# Patient Record
Sex: FEMALE | Race: WHITE | NOT HISPANIC OR LATINO | Employment: OTHER
[De-identification: names, ages, dates, MRNs, and addresses within clinical notes are randomized per-mention and may not be internally consistent; named-entity substitution may affect disease eponyms.]

---

## 2023-02-15 ENCOUNTER — TRANSCRIBE ORDERS (OUTPATIENT)
Dept: OTHER | Age: 68
End: 2023-02-15

## 2023-02-15 DIAGNOSIS — M25.561 RIGHT KNEE PAIN: ICD-10-CM

## 2023-02-15 DIAGNOSIS — M19.90 OSTEOARTHRITIS: Primary | ICD-10-CM

## 2023-03-01 ENCOUNTER — THERAPY VISIT (OUTPATIENT)
Dept: PHYSICAL THERAPY | Facility: CLINIC | Age: 68
End: 2023-03-01
Attending: INTERNAL MEDICINE
Payer: COMMERCIAL

## 2023-03-01 DIAGNOSIS — M25.561 RIGHT KNEE PAIN: ICD-10-CM

## 2023-03-01 DIAGNOSIS — M17.0 OSTEOARTHRITIS OF BOTH KNEES, UNSPECIFIED OSTEOARTHRITIS TYPE: ICD-10-CM

## 2023-03-01 DIAGNOSIS — M19.90 OSTEOARTHRITIS: ICD-10-CM

## 2023-03-01 PROCEDURE — 97110 THERAPEUTIC EXERCISES: CPT | Mod: GP | Performed by: PHYSICAL THERAPIST

## 2023-03-01 PROCEDURE — 97161 PT EVAL LOW COMPLEX 20 MIN: CPT | Mod: GP | Performed by: PHYSICAL THERAPIST

## 2023-03-01 ASSESSMENT — ACTIVITIES OF DAILY LIVING (ADL)
HOW_WOULD_YOU_RATE_THE_OVERALL_FUNCTION_OF_YOUR_KNEE_DURING_YOUR_USUAL_DAILY_ACTIVITIES?: ABNORMAL
STAND: ACTIVITY IS FAIRLY DIFFICULT
SQUAT: ACTIVITY IS MINIMALLY DIFFICULT
HOW_WOULD_YOU_RATE_THE_CURRENT_FUNCTION_OF_YOUR_KNEE_DURING_YOUR_USUAL_DAILY_ACTIVITIES_ON_A_SCALE_FROM_0_TO_100_WITH_100_BEING_YOUR_LEVEL_OF_KNEE_FUNCTION_PRIOR_TO_YOUR_INJURY_AND_0_BEING_THE_INABILITY_TO_PERFORM_ANY_OF_YOUR_USUAL_DAILY_ACTIVITIES?: 50
KNEEL ON THE FRONT OF YOUR KNEE: ACTIVITY IS SOMEWHAT DIFFICULT
GO UP STAIRS: ACTIVITY IS SOMEWHAT DIFFICULT
PAIN: THE SYMPTOM AFFECTS MY ACTIVITY MODERATELY
AS_A_RESULT_OF_YOUR_KNEE_INJURY,_HOW_WOULD_YOU_RATE_YOUR_CURRENT_LEVEL_OF_DAILY_ACTIVITY?: ABNORMAL
SWELLING: THE SYMPTOM AFFECTS MY ACTIVITY MODERATELY
RAW_SCORE: 35
WEAKNESS: THE SYMPTOM AFFECTS MY ACTIVITY SEVERELY
LIMPING: THE SYMPTOM AFFECTS MY ACTIVITY SEVERELY
SIT WITH YOUR KNEE BENT: ACTIVITY IS NOT DIFFICULT
KNEE_ACTIVITY_OF_DAILY_LIVING_SCORE: 50
GIVING WAY, BUCKLING OR SHIFTING OF KNEE: THE SYMPTOM AFFECTS MY ACTIVITY MODERATELY
KNEE_ACTIVITY_OF_DAILY_LIVING_SUM: 35
WALK: ACTIVITY IS FAIRLY DIFFICULT
STIFFNESS: THE SYMPTOM AFFECTS MY ACTIVITY MODERATELY
RISE FROM A CHAIR: ACTIVITY IS MINIMALLY DIFFICULT
GO DOWN STAIRS: ACTIVITY IS FAIRLY DIFFICULT

## 2023-03-01 NOTE — PROGRESS NOTES
[facility]    OUTPATIENT Physical Therapy ORTHOPEDIC EVALUATION  PLAN OF TREATMENT FOR OUTPATIENT REHABILITATION  (COMPLETE FOR INITIAL CLAIMS ONLY)  Patient's Last Name, First Name, M.I.  YOB: 1955  Nila Viveros    Provider s Name:  [facility]   Medical Record No.  [id number]   Start of Care Date:  03/01/23   Onset Date:   09/01/22   Treatment Diagnosis:  R> L OA of knee Medical Diagnosis:     Osteoarthritis  Right knee pain  Osteoarthritis of both knees, unspecified osteoarthritis type       Goals:     03/01/23 0500   Body Part   Goals listed below are for B knee OA R>L   Goal #1   Goal #1 ambulation   Previous Functional Level No restrictions   Current Functional Level Minutes patient can walk   Performance Level 10 min 2/10 pain   STG Target Performance Minutes patient will be able to walk   Performance Level 10 min 1/10 pain   Rationale for safe community ambulation   Due Date 03/26/23    LTG Target Performance Minutes patient will be able to  walk   Performance Level 20 min 1/10 pain   Rationale to promote a healthy and active lifestyle;for safe community ambulation   Due Date 04/15/23       Therapy Frequency:  1 x a week  Predicted Duration of Therapy Intervention:  4 weeks tapering to 1 x a month x 2 months    Michael Kaplan, ANABELLE                 I CERTIFY THE NEED FOR THESE SERVICES FURNISHED UNDER                    THIS PLAN OF TREATMENT AND WHILE UNDER MY CARE .             Physician Signature                                       Date    X_____________________________________________________                      Certification Date From:  03/01/23   Certification Date To:  05/29/23    Referring Provider:  Alyson Sheriff    Initial Assessment        See Epic Evaluation SOC Date: 03/01/23

## 2023-03-01 NOTE — PROGRESS NOTES
Physical Therapy Initial Evaluation  Subjective:  The history is provided by the patient.   Therapist Generated HPI Evaluation  Problem details: Pt referred to PT for R > L knee pain with MD orders dated 2/14/2023 for OA and knee pain.  Pt complains of pain starting 9/2022 for unknown reasons.  Recalls playing pickleball and didn't feel it during activity but reports feeling it that evening.  Reports limiting activities since Sept due to pain.  Pt reports had MRI in Nov and was told she had a meniscus tear.  Pt is getting stem cell injection in the knee.  Has had 2 injections (Nov and Feb 6th and last one will be in March).  Pt goal is to get stronger.  She is hoping to join health club and likes to swim.  .         Type of problem:  Right knee.    This is a chronic condition.  Condition occurred with:  Insidious onset.  Where condition occurred: during recreation/sport and at home.  Patient reports pain:  In the joint.  Pain quality: dull pain. and is intermittent.  Pain radiates to:  Knee. Pain is worse in the P.M..  Since onset symptoms are gradually improving.  Associated symptoms:  Loss of strength (feels unstable, feels weak). Symptoms are exacerbated by ascending stairs, descending stairs, standing, walking and bending/squatting  and relieved by rest and ice (elevating the legs, advil ).  Special tests included:  MRI and x-ray (told she had meniscus tear, OA of knee).                            Objective:  Standing Alignment:              Knee:  Genu varus R and genu varus L (R knee flexed, lacks full extension moderately)      Gait:    Gait Type:  Antalgic     Deviations:  Hip:  Decr dynamic control LKnee:  Knee extension decr R    Flexibility/Screens:   Positive screens:  Knee                                                          Knee Evaluation:  ROM:    AROM      Extension:  Left: -5    Right:  -8  Flexion: Left: 135    Right: 132        Strength:     Extension:  Left: 5/5   Pain:      Right: 5/5    Pain:  Flexion:  Left: 5/5   Pain:      Right: 5/5   Pain:    Quad Set Left: Good    Pain:   Quad Set Right: Fair    Pain:            Functional Testing:          Quad:    Single Leg Squat:  Left:      Right:        Bilateral Leg Squat:   Excessive anterior knee excursion              General     ROS    Assessment/Plan:    Patient is a 67 year old female with both sides knee complaints.    Patient has the following significant findings with corresponding treatment plan.                Diagnosis 1:  B knee OA R > L  Pain -  manual therapy, self management, education and home program  Decreased ROM/flexibility - manual therapy, therapeutic exercise, therapeutic activity and home program  Decreased joint mobility - manual therapy, therapeutic exercise, therapeutic activity and home program  Decreased strength - therapeutic exercise, therapeutic activities and home program  Impaired gait - gait training and home program  Impaired muscle performance - neuro re-education and home program  Decreased function - therapeutic activities and home program    Therapy Evaluation Codes:   1) History comprised of:   Personal factors that impact the plan of care:      Age.    Comorbidity factors that impact the plan of care are:      Osteoarthritis.     Medications impacting care: None.  2) Examination of Body Systems comprised of:   Body structures and functions that impact the plan of care:      Knee.   Activity limitations that impact the plan of care are:      Stairs, Standing and Walking.  3) Clinical presentation characteristics are:   Stable/Uncomplicated.  4) Decision-Making    Low complexity using standardized patient assessment instrument and/or measureable assessment of functional outcome.  Cumulative Therapy Evaluation is: Low complexity.    Previous and current functional limitations:  (See Goal Flow Sheet for this information)    Short term and Long term goals: (See Goal Flow Sheet for this information)      Communication ability:  Patient appears to be able to clearly communicate and understand verbal and written communication and follow directions correctly.  Treatment Explanation - The following has been discussed with the patient:   RX ordered/plan of care  Anticipated outcomes  Possible risks and side effects  This patient would benefit from PT intervention to resume normal activities.   Rehab potential is good to fair    Frequency:  1 X week, once daily  Duration:  for 6 weeks  Discharge Plan:  Achieve all LTG.  Independent in home treatment program.  Reach maximal therapeutic benefit.    Please refer to the daily flowsheet for treatment today, total treatment time and time spent performing 1:1 timed codes.

## 2023-03-08 ENCOUNTER — THERAPY VISIT (OUTPATIENT)
Dept: PHYSICAL THERAPY | Facility: CLINIC | Age: 68
End: 2023-03-08
Attending: INTERNAL MEDICINE
Payer: COMMERCIAL

## 2023-03-08 DIAGNOSIS — M17.0 OSTEOARTHRITIS OF BOTH KNEES, UNSPECIFIED OSTEOARTHRITIS TYPE: Primary | ICD-10-CM

## 2023-03-08 PROCEDURE — 97110 THERAPEUTIC EXERCISES: CPT | Mod: GP | Performed by: PHYSICAL THERAPIST

## 2023-03-08 PROCEDURE — 97140 MANUAL THERAPY 1/> REGIONS: CPT | Mod: GP | Performed by: PHYSICAL THERAPIST

## 2023-03-15 ENCOUNTER — THERAPY VISIT (OUTPATIENT)
Dept: PHYSICAL THERAPY | Facility: CLINIC | Age: 68
End: 2023-03-15
Attending: INTERNAL MEDICINE
Payer: COMMERCIAL

## 2023-03-15 DIAGNOSIS — M17.0 OSTEOARTHRITIS OF BOTH KNEES, UNSPECIFIED OSTEOARTHRITIS TYPE: Primary | ICD-10-CM

## 2023-03-15 PROCEDURE — 97110 THERAPEUTIC EXERCISES: CPT | Mod: GP | Performed by: PHYSICAL THERAPIST

## 2023-03-15 PROCEDURE — 97140 MANUAL THERAPY 1/> REGIONS: CPT | Mod: GP | Performed by: PHYSICAL THERAPIST

## 2023-03-22 ENCOUNTER — THERAPY VISIT (OUTPATIENT)
Dept: PHYSICAL THERAPY | Facility: CLINIC | Age: 68
End: 2023-03-22
Payer: COMMERCIAL

## 2023-03-22 DIAGNOSIS — M17.0 OSTEOARTHRITIS OF BOTH KNEES, UNSPECIFIED OSTEOARTHRITIS TYPE: Primary | ICD-10-CM

## 2023-03-22 PROCEDURE — 97110 THERAPEUTIC EXERCISES: CPT | Mod: GP | Performed by: PHYSICAL THERAPIST

## 2023-03-22 PROCEDURE — 97140 MANUAL THERAPY 1/> REGIONS: CPT | Mod: GP | Performed by: PHYSICAL THERAPIST

## 2023-04-05 ENCOUNTER — THERAPY VISIT (OUTPATIENT)
Dept: PHYSICAL THERAPY | Facility: CLINIC | Age: 68
End: 2023-04-05
Payer: COMMERCIAL

## 2023-04-05 DIAGNOSIS — M17.0 OSTEOARTHRITIS OF BOTH KNEES, UNSPECIFIED OSTEOARTHRITIS TYPE: Primary | ICD-10-CM

## 2023-04-05 PROCEDURE — 97110 THERAPEUTIC EXERCISES: CPT | Mod: GP | Performed by: PHYSICAL THERAPIST

## 2023-04-05 PROCEDURE — 97140 MANUAL THERAPY 1/> REGIONS: CPT | Mod: GP | Performed by: PHYSICAL THERAPIST

## 2023-04-05 PROCEDURE — 97116 GAIT TRAINING THERAPY: CPT | Mod: GP | Performed by: PHYSICAL THERAPIST

## 2023-04-19 ENCOUNTER — THERAPY VISIT (OUTPATIENT)
Dept: PHYSICAL THERAPY | Facility: CLINIC | Age: 68
End: 2023-04-19
Payer: COMMERCIAL

## 2023-04-19 DIAGNOSIS — M17.0 OSTEOARTHRITIS OF BOTH KNEES, UNSPECIFIED OSTEOARTHRITIS TYPE: Primary | ICD-10-CM

## 2023-04-19 PROCEDURE — 97110 THERAPEUTIC EXERCISES: CPT | Mod: GP | Performed by: PHYSICAL THERAPIST

## 2023-04-19 ASSESSMENT — ACTIVITIES OF DAILY LIVING (ADL)
HOW_WOULD_YOU_RATE_THE_OVERALL_FUNCTION_OF_YOUR_KNEE_DURING_YOUR_USUAL_DAILY_ACTIVITIES?: NEARLY NORMAL
WEAKNESS: THE SYMPTOM AFFECTS MY ACTIVITY MODERATELY
STAND: ACTIVITY IS MINIMALLY DIFFICULT
WALK: ACTIVITY IS MINIMALLY DIFFICULT
PAIN: THE SYMPTOM AFFECTS MY ACTIVITY MODERATELY
HOW_WOULD_YOU_RATE_THE_CURRENT_FUNCTION_OF_YOUR_KNEE_DURING_YOUR_USUAL_DAILY_ACTIVITIES_ON_A_SCALE_FROM_0_TO_100_WITH_100_BEING_YOUR_LEVEL_OF_KNEE_FUNCTION_PRIOR_TO_YOUR_INJURY_AND_0_BEING_THE_INABILITY_TO_PERFORM_ANY_OF_YOUR_USUAL_DAILY_ACTIVITIES?: 80
LIMPING: THE SYMPTOM AFFECTS MY ACTIVITY SLIGHTLY
STIFFNESS: I HAVE THE SYMPTOM BUT IT DOES NOT AFFECT MY ACTIVITY
RISE FROM A CHAIR: ACTIVITY IS NOT DIFFICULT
SWELLING: I HAVE THE SYMPTOM BUT IT DOES NOT AFFECT MY ACTIVITY
GO DOWN STAIRS: ACTIVITY IS NOT DIFFICULT
AS_A_RESULT_OF_YOUR_KNEE_INJURY,_HOW_WOULD_YOU_RATE_YOUR_CURRENT_LEVEL_OF_DAILY_ACTIVITY?: NEARLY NORMAL
RAW_SCORE: 53
GIVING WAY, BUCKLING OR SHIFTING OF KNEE: THE SYMPTOM AFFECTS MY ACTIVITY SLIGHTLY
KNEE_ACTIVITY_OF_DAILY_LIVING_SUM: 53
GO UP STAIRS: ACTIVITY IS MINIMALLY DIFFICULT
KNEE_ACTIVITY_OF_DAILY_LIVING_SCORE: 75.71
KNEEL ON THE FRONT OF YOUR KNEE: ACTIVITY IS MINIMALLY DIFFICULT
SIT WITH YOUR KNEE BENT: ACTIVITY IS NOT DIFFICULT
SQUAT: ACTIVITY IS MINIMALLY DIFFICULT

## 2023-06-14 NOTE — PROGRESS NOTES
[facility]    Outpatient Physical Therapy Discharge Note  Patient: Nila Viveros  : 1955    Patient was seen for 6 visits for Knee pain from 3/1/23 to 23 with no follow up appointments.      Plan:  Discharge from therapy.     Reason for Discharge: Patient has met all goals.  No further expectation of progress.    Discharge Plan: Patient to continue home program.     If patient would like to return to therapy, they will need a new PT order from referring provider.    Michelle Box, PT   2023

## 2023-09-29 RX ORDER — LISINOPRIL 20 MG/1
1 TABLET ORAL DAILY
COMMUNITY
Start: 2023-06-07

## 2023-09-29 RX ORDER — EPINEPHRINE 0.3 MG/.3ML
0.3 INJECTION SUBCUTANEOUS PRN
COMMUNITY

## 2023-09-29 RX ORDER — ZINC OXIDE 13 %
1 CREAM (GRAM) TOPICAL DAILY
COMMUNITY
Start: 2022-09-22

## 2023-10-02 RX ORDER — SENNOSIDES 8.6 MG
650-1300 CAPSULE ORAL EVERY 8 HOURS PRN
Status: ON HOLD | COMMUNITY
End: 2023-10-03

## 2023-10-02 RX ORDER — LANOLIN ALCOHOL/MO/W.PET/CERES
3 CREAM (GRAM) TOPICAL
COMMUNITY

## 2023-10-02 NOTE — PROGRESS NOTES
PTA medications updated by Medication Scribe prior to surgery via phone call with patient (last doses completed by Nurse)     Medication history sources: Patient, Surescripts, and H&P  In the past week, patient estimated taking medication this percent of the time: Greater than 90%      Significant changes made to the medication list:  None      Additional medication history information:   Patient was advised to bring: Flonase nasal spray     Medication reconciliation completed by provider prior to medication history? No    Time spent in this activity: 28 minutes    The information provided in this note is only as accurate as the sources available at the time of update(s)      Prior to Admission medications    Medication Sig Last Dose Taking? Auth Provider Long Term End Date   acetaminophen (TYLENOL 8 HOUR ARTHRITIS PAIN) 650 MG CR tablet Take 650-1,300 mg by mouth every 8 hours as needed for mild pain or fever Unknown at PRN Yes Reported, Patient     cholecalciferol (VITAMIN D3) 125 mcg (5000 units) capsule Take 125 mcg by mouth daily 9/30/2023 at AM Yes Reported, Patient     EPINEPHrine (ANY BX GENERIC EQUIV) 0.3 MG/0.3ML injection 2-pack Inject 0.3 mg into the muscle as needed for anaphylaxis May repeat one time in 5-15 minutes if response to initial dose is inadequate. Never used at PRN Yes Reported, Patient     FLUTICASONE PROPIONATE, NASAL, NA Spray 1 spray in nostril daily as needed Unknown at PRN Yes Reported, Patient     lisinopril (ZESTRIL) 20 MG tablet Take 1 tablet by mouth daily 10/2/2023 at AM Yes Reported, Patient Yes    melatonin 3 MG tablet Take 3 mg by mouth nightly as needed for sleep 9/29/2023 at HS Yes Reported, Patient     Misc Natural Products (OSTEO BI-FLEX ADV JOINT SHIELD) TABS Take 1,500 mg by mouth daily 9/30/2023 at AM Yes Reported, Patient     Probiotic Product (PROBIOTIC DAILY) CAPS Take 1 capsule by mouth daily 9/30/2023 at AM Yes Reported, Patient     Ubiquinol 100 MG CAPS Take 100 mg  by mouth daily 9/30/2023 at AM Yes Reported, Patient

## 2023-10-03 ENCOUNTER — APPOINTMENT (OUTPATIENT)
Dept: GENERAL RADIOLOGY | Facility: CLINIC | Age: 68
End: 2023-10-03
Attending: ORTHOPAEDIC SURGERY
Payer: COMMERCIAL

## 2023-10-03 ENCOUNTER — MEDICAL CORRESPONDENCE (OUTPATIENT)
Dept: HEALTH INFORMATION MANAGEMENT | Facility: CLINIC | Age: 68
End: 2023-10-03

## 2023-10-03 ENCOUNTER — ANESTHESIA EVENT (OUTPATIENT)
Dept: SURGERY | Facility: CLINIC | Age: 68
End: 2023-10-03
Payer: COMMERCIAL

## 2023-10-03 ENCOUNTER — ANESTHESIA (OUTPATIENT)
Dept: SURGERY | Facility: CLINIC | Age: 68
End: 2023-10-03
Payer: COMMERCIAL

## 2023-10-03 ENCOUNTER — HOSPITAL ENCOUNTER (OUTPATIENT)
Facility: CLINIC | Age: 68
Discharge: NURSING, REHAB OR LONG-TERM CARE | End: 2023-10-05
Attending: ORTHOPAEDIC SURGERY | Admitting: ORTHOPAEDIC SURGERY
Payer: COMMERCIAL

## 2023-10-03 ENCOUNTER — APPOINTMENT (OUTPATIENT)
Dept: PHYSICAL THERAPY | Facility: CLINIC | Age: 68
End: 2023-10-03
Attending: ORTHOPAEDIC SURGERY
Payer: COMMERCIAL

## 2023-10-03 DIAGNOSIS — Z96.651 S/P TOTAL KNEE ARTHROPLASTY, RIGHT: Primary | ICD-10-CM

## 2023-10-03 LAB
FASTING STATUS PATIENT QL REPORTED: YES
GLUCOSE SERPL-MCNC: 101 MG/DL (ref 70–99)
POTASSIUM SERPL-SCNC: 4.2 MMOL/L (ref 3.4–5.3)

## 2023-10-03 PROCEDURE — 250N000011 HC RX IP 250 OP 636: Mod: JZ | Performed by: PHYSICIAN ASSISTANT

## 2023-10-03 PROCEDURE — 258N000003 HC RX IP 258 OP 636: Performed by: ANESTHESIOLOGY

## 2023-10-03 PROCEDURE — 250N000011 HC RX IP 250 OP 636: Performed by: ANESTHESIOLOGY

## 2023-10-03 PROCEDURE — 250N000011 HC RX IP 250 OP 636: Performed by: PHYSICIAN ASSISTANT

## 2023-10-03 PROCEDURE — 82947 ASSAY GLUCOSE BLOOD QUANT: CPT | Performed by: ANESTHESIOLOGY

## 2023-10-03 PROCEDURE — 258N000003 HC RX IP 258 OP 636: Performed by: PHYSICIAN ASSISTANT

## 2023-10-03 PROCEDURE — 370N000017 HC ANESTHESIA TECHNICAL FEE, PER MIN: Performed by: ORTHOPAEDIC SURGERY

## 2023-10-03 PROCEDURE — 97110 THERAPEUTIC EXERCISES: CPT | Mod: GP

## 2023-10-03 PROCEDURE — 36415 COLL VENOUS BLD VENIPUNCTURE: CPT | Performed by: ANESTHESIOLOGY

## 2023-10-03 PROCEDURE — 999N000141 HC STATISTIC PRE-PROCEDURE NURSING ASSESSMENT: Performed by: ORTHOPAEDIC SURGERY

## 2023-10-03 PROCEDURE — 710N000009 HC RECOVERY PHASE 1, LEVEL 1, PER MIN: Performed by: ORTHOPAEDIC SURGERY

## 2023-10-03 PROCEDURE — 360N000077 HC SURGERY LEVEL 4, PER MIN: Performed by: ORTHOPAEDIC SURGERY

## 2023-10-03 PROCEDURE — 250N000011 HC RX IP 250 OP 636: Mod: JZ | Performed by: NURSE ANESTHETIST, CERTIFIED REGISTERED

## 2023-10-03 PROCEDURE — C1776 JOINT DEVICE (IMPLANTABLE): HCPCS | Performed by: ORTHOPAEDIC SURGERY

## 2023-10-03 PROCEDURE — C1713 ANCHOR/SCREW BN/BN,TIS/BN: HCPCS | Performed by: ORTHOPAEDIC SURGERY

## 2023-10-03 PROCEDURE — 250N000013 HC RX MED GY IP 250 OP 250 PS 637: Performed by: PHYSICIAN ASSISTANT

## 2023-10-03 PROCEDURE — 97161 PT EVAL LOW COMPLEX 20 MIN: CPT | Mod: GP

## 2023-10-03 PROCEDURE — 250N000009 HC RX 250: Performed by: ANESTHESIOLOGY

## 2023-10-03 PROCEDURE — 250N000011 HC RX IP 250 OP 636: Performed by: ORTHOPAEDIC SURGERY

## 2023-10-03 PROCEDURE — 250N000013 HC RX MED GY IP 250 OP 250 PS 637: Performed by: ORTHOPAEDIC SURGERY

## 2023-10-03 PROCEDURE — 999N000063 XR KNEE PORT RIGHT 1/2 VIEWS: Mod: RT

## 2023-10-03 PROCEDURE — 272N000001 HC OR GENERAL SUPPLY STERILE: Performed by: ORTHOPAEDIC SURGERY

## 2023-10-03 PROCEDURE — 84132 ASSAY OF SERUM POTASSIUM: CPT | Performed by: ANESTHESIOLOGY

## 2023-10-03 PROCEDURE — 97530 THERAPEUTIC ACTIVITIES: CPT | Mod: GP

## 2023-10-03 PROCEDURE — 258N000003 HC RX IP 258 OP 636: Performed by: NURSE ANESTHETIST, CERTIFIED REGISTERED

## 2023-10-03 DEVICE — GENESIS II NON-POROUS TIBIAL                                    BASEPLATE SIZE 4 RIGHT
Type: IMPLANTABLE DEVICE | Site: KNEE | Status: FUNCTIONAL
Brand: GENESIS II

## 2023-10-03 DEVICE — LEGION NARROW POSTERIOR STABILIZED                                    OXINIUM SIZE 5N RIGHT
Type: IMPLANTABLE DEVICE | Site: KNEE | Status: FUNCTIONAL
Brand: LEGION

## 2023-10-03 DEVICE — GENESIS II RESURFACING PATELLAR                                    PROSTHESIS  32MM
Type: IMPLANTABLE DEVICE | Site: KNEE | Status: FUNCTIONAL
Brand: GENESIS II

## 2023-10-03 DEVICE — BONE CEMENT RADIOPAQUE SIMPLEX HV FULL DOSE 6194-1-001: Type: IMPLANTABLE DEVICE | Site: KNEE | Status: FUNCTIONAL

## 2023-10-03 DEVICE — LEGION POSTERIOR STABILIZED HIGH                                    FLEX HIGHLY CROSS LINKED                                    POLYETHYLENE SIZE 3-4 11MM
Type: IMPLANTABLE DEVICE | Site: KNEE | Status: FUNCTIONAL
Brand: LEGION

## 2023-10-03 RX ORDER — LIDOCAINE HYDROCHLORIDE 20 MG/ML
INJECTION, SOLUTION INFILTRATION; PERINEURAL PRN
Status: DISCONTINUED | OUTPATIENT
Start: 2023-10-03 | End: 2023-10-03

## 2023-10-03 RX ORDER — AMOXICILLIN 250 MG
1-2 CAPSULE ORAL 2 TIMES DAILY
Qty: 30 TABLET | Refills: 0 | Status: SHIPPED | OUTPATIENT
Start: 2023-10-03 | End: 2023-10-11

## 2023-10-03 RX ORDER — ONDANSETRON 2 MG/ML
4 INJECTION INTRAMUSCULAR; INTRAVENOUS EVERY 30 MIN PRN
Status: DISCONTINUED | OUTPATIENT
Start: 2023-10-03 | End: 2023-10-03 | Stop reason: HOSPADM

## 2023-10-03 RX ORDER — AMOXICILLIN 250 MG
1 CAPSULE ORAL 2 TIMES DAILY
Status: DISCONTINUED | OUTPATIENT
Start: 2023-10-03 | End: 2023-10-05 | Stop reason: HOSPADM

## 2023-10-03 RX ORDER — CEFAZOLIN SODIUM 1 G/3ML
1 INJECTION, POWDER, FOR SOLUTION INTRAMUSCULAR; INTRAVENOUS EVERY 8 HOURS
Status: COMPLETED | OUTPATIENT
Start: 2023-10-03 | End: 2023-10-04

## 2023-10-03 RX ORDER — NALOXONE HYDROCHLORIDE 0.4 MG/ML
0.4 INJECTION, SOLUTION INTRAMUSCULAR; INTRAVENOUS; SUBCUTANEOUS
Status: DISCONTINUED | OUTPATIENT
Start: 2023-10-03 | End: 2023-10-05 | Stop reason: HOSPADM

## 2023-10-03 RX ORDER — HYDROMORPHONE HYDROCHLORIDE 2 MG/1
2-4 TABLET ORAL EVERY 4 HOURS PRN
Qty: 31 TABLET | Refills: 0 | Status: SHIPPED | OUTPATIENT
Start: 2023-10-03 | End: 2023-10-11

## 2023-10-03 RX ORDER — HYDROMORPHONE HYDROCHLORIDE 2 MG/1
2 TABLET ORAL EVERY 4 HOURS PRN
Status: DISCONTINUED | OUTPATIENT
Start: 2023-10-03 | End: 2023-10-05 | Stop reason: HOSPADM

## 2023-10-03 RX ORDER — PROCHLORPERAZINE MALEATE 5 MG
5 TABLET ORAL EVERY 6 HOURS PRN
Status: DISCONTINUED | OUTPATIENT
Start: 2023-10-03 | End: 2023-10-05 | Stop reason: HOSPADM

## 2023-10-03 RX ORDER — FENTANYL CITRATE 50 UG/ML
INJECTION, SOLUTION INTRAMUSCULAR; INTRAVENOUS PRN
Status: DISCONTINUED | OUTPATIENT
Start: 2023-10-03 | End: 2023-10-03

## 2023-10-03 RX ORDER — SODIUM CHLORIDE, SODIUM LACTATE, POTASSIUM CHLORIDE, CALCIUM CHLORIDE 600; 310; 30; 20 MG/100ML; MG/100ML; MG/100ML; MG/100ML
INJECTION, SOLUTION INTRAVENOUS CONTINUOUS
Status: DISCONTINUED | OUTPATIENT
Start: 2023-10-03 | End: 2023-10-03 | Stop reason: HOSPADM

## 2023-10-03 RX ORDER — ASPIRIN 81 MG/1
81 TABLET ORAL 2 TIMES DAILY
Qty: 60 TABLET | Refills: 0 | Status: SHIPPED | OUTPATIENT
Start: 2023-10-03 | End: 2023-12-22

## 2023-10-03 RX ORDER — PROPOFOL 10 MG/ML
INJECTION, EMULSION INTRAVENOUS PRN
Status: DISCONTINUED | OUTPATIENT
Start: 2023-10-03 | End: 2023-10-03

## 2023-10-03 RX ORDER — ONDANSETRON 4 MG/1
4 TABLET, ORALLY DISINTEGRATING ORAL EVERY 6 HOURS PRN
Status: DISCONTINUED | OUTPATIENT
Start: 2023-10-03 | End: 2023-10-05 | Stop reason: HOSPADM

## 2023-10-03 RX ORDER — ACETAMINOPHEN 325 MG/1
650 TABLET ORAL EVERY 4 HOURS PRN
Status: DISCONTINUED | OUTPATIENT
Start: 2023-10-06 | End: 2023-10-05 | Stop reason: HOSPADM

## 2023-10-03 RX ORDER — TRANEXAMIC ACID 650 MG/1
1950 TABLET ORAL ONCE
Status: COMPLETED | OUTPATIENT
Start: 2023-10-03 | End: 2023-10-03

## 2023-10-03 RX ORDER — HYDROMORPHONE HYDROCHLORIDE 4 MG/1
4 TABLET ORAL EVERY 4 HOURS PRN
Status: DISCONTINUED | OUTPATIENT
Start: 2023-10-03 | End: 2023-10-05 | Stop reason: HOSPADM

## 2023-10-03 RX ORDER — POLYETHYLENE GLYCOL 3350 17 G/17G
17 POWDER, FOR SOLUTION ORAL DAILY
Status: DISCONTINUED | OUTPATIENT
Start: 2023-10-04 | End: 2023-10-05 | Stop reason: HOSPADM

## 2023-10-03 RX ORDER — HYDROXYZINE HYDROCHLORIDE 10 MG/1
10 TABLET, FILM COATED ORAL EVERY 6 HOURS PRN
Status: DISCONTINUED | OUTPATIENT
Start: 2023-10-03 | End: 2023-10-05 | Stop reason: HOSPADM

## 2023-10-03 RX ORDER — ASPIRIN 81 MG/1
81 TABLET ORAL 2 TIMES DAILY
Status: DISCONTINUED | OUTPATIENT
Start: 2023-10-03 | End: 2023-10-05 | Stop reason: HOSPADM

## 2023-10-03 RX ORDER — VANCOMYCIN HYDROCHLORIDE 1 G/20ML
INJECTION, POWDER, LYOPHILIZED, FOR SOLUTION INTRAVENOUS PRN
Status: DISCONTINUED | OUTPATIENT
Start: 2023-10-03 | End: 2023-10-03 | Stop reason: HOSPADM

## 2023-10-03 RX ORDER — NALOXONE HYDROCHLORIDE 0.4 MG/ML
0.2 INJECTION, SOLUTION INTRAMUSCULAR; INTRAVENOUS; SUBCUTANEOUS
Status: DISCONTINUED | OUTPATIENT
Start: 2023-10-03 | End: 2023-10-05 | Stop reason: HOSPADM

## 2023-10-03 RX ORDER — HYDROMORPHONE HCL IN WATER/PF 6 MG/30 ML
0.2 PATIENT CONTROLLED ANALGESIA SYRINGE INTRAVENOUS EVERY 5 MIN PRN
Status: DISCONTINUED | OUTPATIENT
Start: 2023-10-03 | End: 2023-10-03 | Stop reason: HOSPADM

## 2023-10-03 RX ORDER — HYDROMORPHONE HCL IN WATER/PF 6 MG/30 ML
0.4 PATIENT CONTROLLED ANALGESIA SYRINGE INTRAVENOUS
Status: DISCONTINUED | OUTPATIENT
Start: 2023-10-03 | End: 2023-10-05 | Stop reason: HOSPADM

## 2023-10-03 RX ORDER — HYDROMORPHONE HCL IN WATER/PF 6 MG/30 ML
0.4 PATIENT CONTROLLED ANALGESIA SYRINGE INTRAVENOUS EVERY 5 MIN PRN
Status: DISCONTINUED | OUTPATIENT
Start: 2023-10-03 | End: 2023-10-03 | Stop reason: HOSPADM

## 2023-10-03 RX ORDER — CELECOXIB 200 MG/1
200 CAPSULE ORAL DAILY
Qty: 14 CAPSULE | Refills: 0 | Status: SHIPPED | OUTPATIENT
Start: 2023-10-03 | End: 2023-12-22

## 2023-10-03 RX ORDER — ONDANSETRON 2 MG/ML
4 INJECTION INTRAMUSCULAR; INTRAVENOUS EVERY 6 HOURS PRN
Status: DISCONTINUED | OUTPATIENT
Start: 2023-10-03 | End: 2023-10-05 | Stop reason: HOSPADM

## 2023-10-03 RX ORDER — HYDROMORPHONE HYDROCHLORIDE 2 MG/1
2-4 TABLET ORAL EVERY 4 HOURS PRN
Qty: 31 TABLET | Refills: 0 | Status: SHIPPED | OUTPATIENT
Start: 2023-10-03 | End: 2023-10-03

## 2023-10-03 RX ORDER — ONDANSETRON 4 MG/1
4 TABLET, ORALLY DISINTEGRATING ORAL EVERY 30 MIN PRN
Status: DISCONTINUED | OUTPATIENT
Start: 2023-10-03 | End: 2023-10-03 | Stop reason: HOSPADM

## 2023-10-03 RX ORDER — ACETAMINOPHEN 325 MG/1
975 TABLET ORAL EVERY 8 HOURS
Status: DISCONTINUED | OUTPATIENT
Start: 2023-10-03 | End: 2023-10-05 | Stop reason: HOSPADM

## 2023-10-03 RX ORDER — LIDOCAINE 40 MG/G
CREAM TOPICAL
Status: DISCONTINUED | OUTPATIENT
Start: 2023-10-03 | End: 2023-10-03 | Stop reason: HOSPADM

## 2023-10-03 RX ORDER — ONDANSETRON 2 MG/ML
INJECTION INTRAMUSCULAR; INTRAVENOUS PRN
Status: DISCONTINUED | OUTPATIENT
Start: 2023-10-03 | End: 2023-10-03

## 2023-10-03 RX ORDER — HYDROMORPHONE HCL IN WATER/PF 6 MG/30 ML
0.2 PATIENT CONTROLLED ANALGESIA SYRINGE INTRAVENOUS
Status: DISCONTINUED | OUTPATIENT
Start: 2023-10-03 | End: 2023-10-05 | Stop reason: HOSPADM

## 2023-10-03 RX ORDER — SODIUM CHLORIDE, SODIUM LACTATE, POTASSIUM CHLORIDE, CALCIUM CHLORIDE 600; 310; 30; 20 MG/100ML; MG/100ML; MG/100ML; MG/100ML
INJECTION, SOLUTION INTRAVENOUS CONTINUOUS
Status: DISCONTINUED | OUTPATIENT
Start: 2023-10-03 | End: 2023-10-05 | Stop reason: HOSPADM

## 2023-10-03 RX ORDER — ACETAMINOPHEN 325 MG/1
650 TABLET ORAL EVERY 4 HOURS PRN
Qty: 100 TABLET | Refills: 0 | Status: ON HOLD | OUTPATIENT
Start: 2023-10-03 | End: 2024-02-07

## 2023-10-03 RX ORDER — LIDOCAINE 40 MG/G
CREAM TOPICAL
Status: DISCONTINUED | OUTPATIENT
Start: 2023-10-03 | End: 2023-10-05 | Stop reason: HOSPADM

## 2023-10-03 RX ORDER — PROPOFOL 10 MG/ML
INJECTION, EMULSION INTRAVENOUS CONTINUOUS PRN
Status: DISCONTINUED | OUTPATIENT
Start: 2023-10-03 | End: 2023-10-03

## 2023-10-03 RX ORDER — DEXMEDETOMIDINE HYDROCHLORIDE 4 UG/ML
INJECTION, SOLUTION INTRAVENOUS PRN
Status: DISCONTINUED | OUTPATIENT
Start: 2023-10-03 | End: 2023-10-03

## 2023-10-03 RX ORDER — LISINOPRIL 20 MG/1
20 TABLET ORAL DAILY
Status: DISCONTINUED | OUTPATIENT
Start: 2023-10-03 | End: 2023-10-05 | Stop reason: HOSPADM

## 2023-10-03 RX ORDER — CEFAZOLIN SODIUM/WATER 2 G/20 ML
2 SYRINGE (ML) INTRAVENOUS
Status: COMPLETED | OUTPATIENT
Start: 2023-10-03 | End: 2023-10-03

## 2023-10-03 RX ORDER — BISACODYL 10 MG
10 SUPPOSITORY, RECTAL RECTAL DAILY PRN
Status: DISCONTINUED | OUTPATIENT
Start: 2023-10-03 | End: 2023-10-05 | Stop reason: HOSPADM

## 2023-10-03 RX ORDER — FENTANYL CITRATE 50 UG/ML
50 INJECTION, SOLUTION INTRAMUSCULAR; INTRAVENOUS EVERY 5 MIN PRN
Status: DISCONTINUED | OUTPATIENT
Start: 2023-10-03 | End: 2023-10-03 | Stop reason: HOSPADM

## 2023-10-03 RX ORDER — DEXAMETHASONE SODIUM PHOSPHATE 4 MG/ML
INJECTION, SOLUTION INTRA-ARTICULAR; INTRALESIONAL; INTRAMUSCULAR; INTRAVENOUS; SOFT TISSUE PRN
Status: DISCONTINUED | OUTPATIENT
Start: 2023-10-03 | End: 2023-10-03

## 2023-10-03 RX ORDER — FENTANYL CITRATE 50 UG/ML
25 INJECTION, SOLUTION INTRAMUSCULAR; INTRAVENOUS EVERY 5 MIN PRN
Status: DISCONTINUED | OUTPATIENT
Start: 2023-10-03 | End: 2023-10-03 | Stop reason: HOSPADM

## 2023-10-03 RX ORDER — CELECOXIB 100 MG/1
100 CAPSULE ORAL 2 TIMES DAILY
Status: COMPLETED | OUTPATIENT
Start: 2023-10-03 | End: 2023-10-05

## 2023-10-03 RX ORDER — FENTANYL CITRATE 0.05 MG/ML
25-100 INJECTION, SOLUTION INTRAMUSCULAR; INTRAVENOUS
Status: DISCONTINUED | OUTPATIENT
Start: 2023-10-03 | End: 2023-10-03 | Stop reason: HOSPADM

## 2023-10-03 RX ORDER — CEFAZOLIN SODIUM/WATER 2 G/20 ML
2 SYRINGE (ML) INTRAVENOUS SEE ADMIN INSTRUCTIONS
Status: DISCONTINUED | OUTPATIENT
Start: 2023-10-03 | End: 2023-10-03 | Stop reason: HOSPADM

## 2023-10-03 RX ADMIN — PHENYLEPHRINE HYDROCHLORIDE 0.3 MCG/KG/MIN: 10 INJECTION INTRAVENOUS at 13:40

## 2023-10-03 RX ADMIN — BUPIVACAINE HYDROCHLORIDE 15 ML: 5 INJECTION, SOLUTION EPIDURAL; INTRACAUDAL at 11:13

## 2023-10-03 RX ADMIN — MEPIVACAINE HYDROCHLORIDE 2.5 ML: 20 INJECTION, SOLUTION EPIDURAL; INFILTRATION at 13:32

## 2023-10-03 RX ADMIN — TRANEXAMIC ACID 1950 MG: 650 TABLET ORAL at 10:53

## 2023-10-03 RX ADMIN — CEFAZOLIN 1 G: 1 INJECTION, POWDER, FOR SOLUTION INTRAMUSCULAR; INTRAVENOUS at 20:29

## 2023-10-03 RX ADMIN — FENTANYL CITRATE 25 MCG: 50 INJECTION, SOLUTION INTRAMUSCULAR; INTRAVENOUS at 14:09

## 2023-10-03 RX ADMIN — SODIUM CHLORIDE, POTASSIUM CHLORIDE, SODIUM LACTATE AND CALCIUM CHLORIDE: 600; 310; 30; 20 INJECTION, SOLUTION INTRAVENOUS at 13:11

## 2023-10-03 RX ADMIN — SENNOSIDES AND DOCUSATE SODIUM 1 TABLET: 50; 8.6 TABLET ORAL at 20:39

## 2023-10-03 RX ADMIN — DEXMEDETOMIDINE HYDROCHLORIDE 8 MCG: 200 INJECTION INTRAVENOUS at 13:28

## 2023-10-03 RX ADMIN — DEXAMETHASONE SODIUM PHOSPHATE 4 MG: 4 INJECTION, SOLUTION INTRA-ARTICULAR; INTRALESIONAL; INTRAMUSCULAR; INTRAVENOUS; SOFT TISSUE at 13:45

## 2023-10-03 RX ADMIN — CELECOXIB 100 MG: 100 CAPSULE ORAL at 20:27

## 2023-10-03 RX ADMIN — SODIUM CHLORIDE, POTASSIUM CHLORIDE, SODIUM LACTATE AND CALCIUM CHLORIDE: 600; 310; 30; 20 INJECTION, SOLUTION INTRAVENOUS at 15:06

## 2023-10-03 RX ADMIN — ONDANSETRON 4 MG: 4 TABLET, ORALLY DISINTEGRATING ORAL at 18:41

## 2023-10-03 RX ADMIN — DEXMEDETOMIDINE HYDROCHLORIDE 8 MCG: 200 INJECTION INTRAVENOUS at 13:35

## 2023-10-03 RX ADMIN — FENTANYL CITRATE 50 MCG: 50 INJECTION, SOLUTION INTRAMUSCULAR; INTRAVENOUS at 16:12

## 2023-10-03 RX ADMIN — PHENYLEPHRINE HYDROCHLORIDE 100 MCG: 10 INJECTION INTRAVENOUS at 14:50

## 2023-10-03 RX ADMIN — MIDAZOLAM 1 MG: 1 INJECTION INTRAMUSCULAR; INTRAVENOUS at 13:54

## 2023-10-03 RX ADMIN — HYDROMORPHONE HYDROCHLORIDE 2 MG: 2 TABLET ORAL at 18:00

## 2023-10-03 RX ADMIN — PROPOFOL 30 MG: 10 INJECTION, EMULSION INTRAVENOUS at 13:54

## 2023-10-03 RX ADMIN — LIDOCAINE HYDROCHLORIDE 40 MG: 20 INJECTION, SOLUTION INFILTRATION; PERINEURAL at 13:34

## 2023-10-03 RX ADMIN — PROPOFOL 100 MCG/KG/MIN: 10 INJECTION, EMULSION INTRAVENOUS at 13:34

## 2023-10-03 RX ADMIN — LIDOCAINE HYDROCHLORIDE 40 MG: 20 INJECTION, SOLUTION INFILTRATION; PERINEURAL at 14:03

## 2023-10-03 RX ADMIN — ASPIRIN 81 MG: 81 TABLET, COATED ORAL at 20:27

## 2023-10-03 RX ADMIN — ONDANSETRON 4 MG: 2 INJECTION INTRAMUSCULAR; INTRAVENOUS at 13:45

## 2023-10-03 RX ADMIN — MIDAZOLAM 1 MG: 1 INJECTION INTRAMUSCULAR; INTRAVENOUS at 13:30

## 2023-10-03 RX ADMIN — MIDAZOLAM 2 MG: 1 INJECTION INTRAMUSCULAR; INTRAVENOUS at 11:10

## 2023-10-03 RX ADMIN — FENTANYL CITRATE 50 MCG: 50 INJECTION, SOLUTION INTRAMUSCULAR; INTRAVENOUS at 16:31

## 2023-10-03 RX ADMIN — LIDOCAINE HYDROCHLORIDE 20 MG: 20 INJECTION, SOLUTION INFILTRATION; PERINEURAL at 14:09

## 2023-10-03 RX ADMIN — LISINOPRIL 20 MG: 20 TABLET ORAL at 17:37

## 2023-10-03 RX ADMIN — HYDROMORPHONE HYDROCHLORIDE 2 MG: 2 TABLET ORAL at 23:30

## 2023-10-03 RX ADMIN — DEXMEDETOMIDINE HYDROCHLORIDE 4 MCG: 200 INJECTION INTRAVENOUS at 13:54

## 2023-10-03 RX ADMIN — ACETAMINOPHEN 975 MG: 325 TABLET, FILM COATED ORAL at 17:37

## 2023-10-03 RX ADMIN — Medication 2 G: at 13:27

## 2023-10-03 RX ADMIN — HYDROXYZINE HYDROCHLORIDE 10 MG: 10 TABLET ORAL at 20:27

## 2023-10-03 ASSESSMENT — ACTIVITIES OF DAILY LIVING (ADL)
ADLS_ACUITY_SCORE: 18

## 2023-10-03 ASSESSMENT — LIFESTYLE VARIABLES: TOBACCO_USE: 0

## 2023-10-03 ASSESSMENT — COPD QUESTIONNAIRES: COPD: 0

## 2023-10-03 NOTE — OP NOTE
Procedure Date: October 3, 2023    PATIENT: Nila Viveros  1955     PREOPERATIVE DIAGNOSIS:  Right knee advanced osteoarthritis.     POSTOPERATIVE DIAGNOSIS:  Right knee advanced osteoarthritis.     PROCEDURE:  Right total knee arthroplasty.     SURGEON:  Darshan Caban MD     ASSISTANT:  Ava Vargas PA-C     COMPLICATIONS:  None.     ESTIMATED BLOOD LOSS:  30 mL.     IMPLANTS:  [employer] system:  1.  Size 5, narrow, right, PS femoral component, OXINIUM (due to nickel allergy)  2.  Size 4, right standard tibial baseplate.  3.  Size 11, 3/4, high flex PS polyethylene.  4.  Size 32 concentric polyethylene patella.     DESCRIPTION OF PROCEDURE:  The patient was brought to the operating room October 3, 2023 for right total knee arthroplasty.  Patient has a history of advanced osteoarthritis of the knee refractory to conservative measures. Patient was seen on the day of surgery in the preoperative holding area.  Right knee marked as the correct operative site.  Received a dose of IV antibiotic less than 30 minutes prior to surgical incision.  Post-surgical antibiotic and DVT prophylaxis are indicated and will be prescribed.  Skilled assistant was used for positioning, draping, retraction, closure, dressing application. Regional block administered by anesthesia.     The patient was brought to the operating room and placed under a spinal anesthesia.  The operative lower extremity was prepped and draped in the standard sterile fashion.  Preoperative timeout was taken.  Thigh tourniquet inflated to 250 mmHg.  Anterior longitudinal surgical incision was made.  Medial parapatellar arthrotomy was performed.  Advanced tricompartmental osteoarthritis confirmed.  Marginal osteophytes debrided.  Synovectomy performed.  Patella was cut to a thickness of 14 mm and sized to a 32.  Femur prepared with an intramedullary guide leslie and a 5-degree valgus cutting guide.  I took an extra 2 mm of distal femur and  sized the cut distal femur to a 5 narrow.  Prepared in the appropriate rotational alignment.    The tibia was prepared using an extramedullary cutting guide, taking 7 mm of bone and cartilage off the lateral side.  The cut proximal tibia was sized to a 4 and was punched in the appropriate rotational alignment centered on the medial third of the tibial tubercle.  Posterior osteophytes and menisci were removed.  Posterior capsule injected with a cocktail of Toradol, ropivacaine and saline.    With trial components in place and a size 11 trial PS polyethylene, I was pleased with range of motion, stability, and gap balancing.  Trial components were removed.  Jet lavage irrigation performed.  Components listed above were cemented into place using 1 batch of high viscosity Simplex cement without antibiotic.  Once the cement had hardened and all extruded cement removed, I implanted the size 11 PS polyethylene for the 4 baseplate.    Tourniquet deflated.  Hemostasis achieved.  Betadine wash performed.  Jet lavage irrigation performed.  Arthrotomy was closed with interrupted Ethibond sutures over 1 g of vancomycin powder.  Superficial soft tissues irrigated and closed in layers.  A sterile dressing was applied.  The patient was brought to recovery in stable condition.  Needle and lap counts were correct at the end of the case.  There were no known complications.    Additional intraoperative findings of note: Patient has a nickel allergy. I therefore used OXINIUM femoral implant    Special postsurgical patient care factors: Standard postsurgical protocol as ordered.     Darshan Caban MD

## 2023-10-03 NOTE — ANESTHESIA CARE TRANSFER NOTE
Patient: Nila Viveros    Procedure: Procedure(s):  RIGHT TOTAL KNEE ARTHROPLASTY       Diagnosis: Osteoarthritis of right knee [M17.11]  Diagnosis Additional Information: No value filed.    Anesthesia Type:   Spinal     Note:    Oropharynx: oropharynx clear of all foreign objects and spontaneously breathing  Level of Consciousness: awake  Oxygen Supplementation: face mask  Level of Supplemental Oxygen (L/min / FiO2): 6  Independent Airway: airway patency satisfactory and stable  Dentition: dentition unchanged  Vital Signs Stable: post-procedure vital signs reviewed and stable  Report to RN Given: handoff report given  Patient transferred to: PACU  Comments: At end of procedure, spontaneous respirations, patient alert to voice, able to follow commands. Oxygen via facemask at 6 liters per minute to PACU. Oxygen tubing connected to wall O2 in PACU, SpO2, NiBP, and EKG monitors and alarms on and functioning, Noe Hugger warmer connected to patient gown, report on patient's clinical status given to PACU RN, RN questions answered.      Handoff Report: Identifed the Patient, Identified the Reponsible Provider, Reviewed the pertinent medical history, Discussed the surgical course, Reviewed Intra-OP anesthesia mangement and issues during anesthesia, Set expectations for post-procedure period and Allowed opportunity for questions and acknowledgement of understanding      Vitals:  Vitals Value Taken Time   BP     Temp     Pulse 79 10/03/23 1521   Resp 28 10/03/23 1521   SpO2 98 % 10/03/23 1521   Vitals shown include unvalidated device data.    Electronically Signed By: HA Gamboa CRNA  October 3, 2023  3:23 PM

## 2023-10-03 NOTE — ANESTHESIA POSTPROCEDURE EVALUATION
Patient: Nila Viveros    Procedure: Procedure(s):  RIGHT TOTAL KNEE ARTHROPLASTY       Anesthesia Type:  Spinal    Note:  Disposition: Admission   Postop Pain Control: Uneventful            Sign Out: Well controlled pain   PONV: No   Neuro/Psych: Uneventful            Sign Out: Acceptable/Baseline neuro status   Airway/Respiratory: Uneventful            Sign Out: Acceptable/Baseline resp. status   CV/Hemodynamics: Uneventful            Sign Out: Acceptable CV status   Other NRE: NONE   DID A NON-ROUTINE EVENT OCCUR?     Event details/Postop Comments:  Recovery from regional anesthesia has not occurred but is anticipated within 48 hours.            Last vitals:  Vitals Value Taken Time   /78 10/03/23 1600   Temp     Pulse 70 10/03/23 1608   Resp 22 10/03/23 1608   SpO2 98 % 10/03/23 1608   Vitals shown include unvalidated device data.    Electronically Signed By: Alexis Bradshaw MD  October 3, 2023  4:10 PM

## 2023-10-03 NOTE — ANESTHESIA PROCEDURE NOTES
"Intrathecal injection Procedure Note    Pre-Procedure   Staff -        Anesthesiologist:  Alexis Bradshaw MD       Performed By: anesthesiologist       Location: OR       Pre-Anesthestic Checklist: patient identified, IV checked, risks and benefits discussed, informed consent, monitors and equipment checked and pre-op evaluation  Timeout:       Correct Patient: Yes        Correct Procedure: Yes        Correct Site: Yes        Correct Position: Yes   Procedure Documentation  Procedure: intrathecal injection       Patient Position: sitting       Skin prep: Chloraprep       Insertion Site: L4-5. (midline approach).       Needle Gauge: 25.        Needle Length (Inches): 3.5        Spinal Needle Type: Alecia-Larry       Introducer used       Introducer: 20 G       # of attempts: 1 and  # of redirects:     Assessment/Narrative         Paresthesias: No.       CSF fluid: clear.    Medication(s) Administered   2% Mepivacaine PF (Intrathecal) - Intrathecal   2.5 mL - 10/3/2023 1:32:00 PM   Comments:  Patient tolerated well.   Pre/Post aspiration.   No complications.         FOR [facility] ([facility]) ONLY:   Pain Team Contact information: please page the Pain Team Via [employer]. Search \"Pain\". During daytime hours, please page the attending first. At night please page the resident first.      "

## 2023-10-03 NOTE — ANESTHESIA PREPROCEDURE EVALUATION
Anesthesia Pre-Procedure Evaluation    Patient: Nila Viveros   MRN: [id number] : 1955        Procedure : Procedure(s):  RIGHT TOTAL KNEE ARTHROPLASTY          Past Medical History:   Diagnosis Date    Degenerative arthritis of knee, bilateral     Giardia     has intermittent flares of diarrhea    H/O vein stripping     Right leg    Hx of prior ablation treatment     Lap ablation for uterine fibroids    Hypertension     Infection due to 2019 novel coronavirus     Knee mass, right     Pap smear for cervical cancer screening     S/P LEEP     S/P right knee arthroscopy     S/P tubal ligation       History reviewed. No pertinent surgical history.   Allergies   Allergen Reactions    Azithromycin Hives    Bee Venom Hives      Social History     Tobacco Use    Smoking status: Never    Smokeless tobacco: Never   Substance Use Topics    Alcohol use: Not Currently      Wt Readings from Last 1 Encounters:   No data found for Wt        Anesthesia Evaluation            ROS/MED HX  ENT/Pulmonary:     (+)           allergic rhinitis,                         (-) tobacco use, asthma and COPD   Neurologic:  - neg neurologic ROS     Cardiovascular:     (+)  hypertension- -   -  - -                                      METS/Exercise Tolerance:     Hematologic:       Musculoskeletal:   (+)  arthritis,             GI/Hepatic:  - neg GI/hepatic ROS     Renal/Genitourinary:  - neg Renal ROS     Endo:       Psychiatric/Substance Use:  - neg psychiatric ROS     Infectious Disease:       Malignancy:  - neg malignancy ROS     Other:            Physical Exam    Airway        Mallampati: II   TM distance: > 3 FB    Mouth opening: > 3 cm    Respiratory Devices and Support         Dental       (+) Modest Abnormalities - crowns, retainers, 1 or 2 missing teeth    B=Bridge, C=Chipped, L=Loose, M=Missing    Cardiovascular   cardiovascular exam normal       Rhythm and rate: regular     Pulmonary   pulmonary exam normal         breath sounds clear to auscultation           OUTSIDE LABS:  CBC: No results found for: WBC, HGB, HCT, PLT  BMP: No results found for: NA, POTASSIUM, CHLORIDE, CO2, BUN, CR, GLC  COAGS: No results found for: PTT, INR, FIBR  POC: No results found for: BGM, HCG, HCGS  HEPATIC: No results found for: ALBUMIN, PROTTOTAL, ALT, AST, GGT, ALKPHOS, BILITOTAL, BILIDIRECT, KATELYNN  OTHER: No results found for: PH, LACT, A1C, JARRETT, PHOS, MAG, LIPASE, AMYLASE, TSH, T4, T3, CRP, SED    Anesthesia Plan    ASA Status:  2    NPO Status:  NPO Appropriate    Anesthesia Type: Spinal.   Induction: Intravenous, Propofol.           Consents    Anesthesia Plan(s) and associated risks, benefits, and realistic alternatives discussed. Questions answered and patient/representative(s) expressed understanding.     - Discussed:     - Discussed with:  Patient      - Extended Intubation/Ventilatory Support Discussed: No.      - Patient is DNR/DNI Status: No     Use of blood products discussed: Yes.     - Discussed with: Patient.     - Consented: consented to blood products            Reason for refusal: other.     Postoperative Care    Pain management: Multi-modal analgesia, Peripheral nerve block (Single Shot), IV analgesics.   PONV prophylaxis: Ondansetron (or other 5HT-3), Dexamethasone or Solumedrol     Comments:    Other Comments: Patient is counseled on the anesthesia plan and relevant anesthesia procedures including all risks and benefits. All patient questions were answered.               Alexis Bradshaw MD

## 2023-10-03 NOTE — BRIEF OP NOTE
[facility]    Brief Operative Note    Pre-operative diagnosis: Osteoarthritis of right knee [M17.11]  Post-operative diagnosis Same as pre-operative diagnosis    Procedure: RIGHT TOTAL KNEE ARTHROPLASTY, Right - Knee    Surgeon: Surgeon(s) and Role:     * Darshan Caban MD - Primary     * Ava Vargas PA-C - Assisting  Anesthesia: Spinal with Block   Estimated Blood Loss: 30    Drains: None  Specimens: * No specimens in log *  Findings:   None.  Complications: None.  Implants:   Implant Name Type Inv. Item Serial No.  Lot No. LRB No. Used Action   BONE CEMENT RADIOPAQUE SIMPLEX HV FULL DOSE 6194-1-001 - [id number] Cement, Bone BONE CEMENT RADIOPAQUE SIMPLEX HV FULL DOSE 6194-1-001  [employer] [id number] Right 1 Implanted   IMP COMP FEM S&N LEGION PS OXIN NARROW PS SZ5N RT [id number] - [id number] Total Joint Component/Insert IMP COMP FEM S&N LEGION PS OXIN NARROW PS SZ5N RT [id number]  [employer] [id number] Right 1 Implanted   IMP BASEPLATE TIBIAL LAURY II SZ 4 RT TI [id number] - [id number] Total Joint Component/Insert IMP BASEPLATE TIBIAL LAURY II SZ 4 RT TI [id number]  [employer]R [id number] Right 1 Implanted   IMP COMP PATELLA SNR LAURY II 9X32MM [id number] - [id number] Total Joint Component/Insert IMP COMP PATELLA SNR LAURY II 9X32MM [id number]  [employer] [id number] Right 1 Implanted   IMP INSERT TIB S&N LGN PS HI FLEX XLPE SZ3-4 11MM [id number] - [id number] Total Joint Component/Insert IMP INSERT TIB S&N LGN PS HI FLEX XLPE SZ3-4 11MM [id number]  [employer] [id number] Right 1 Implanted

## 2023-10-03 NOTE — ANESTHESIA PROCEDURE NOTES
Adductor canal Procedure Note    Pre-Procedure   Staff -        Anesthesiologist:  Alexis Bradshaw MD       Performed By: anesthesiologist       Location: pre-op       Pre-Anesthestic Checklist: patient identified, IV checked, site marked, risks and benefits discussed, informed consent, monitors and equipment checked, pre-op evaluation, at physician/surgeon's request and post-op pain management  Timeout:       Correct Patient: Yes        Correct Procedure: Yes        Correct Site: Yes        Correct Position: Yes        Correct Laterality: Yes        Site Marked: Yes  Procedure Documentation  Procedure: Adductor canal       Laterality: right       Patient Position: supine       Patient Prep/Sterile Barriers: mask       Skin prep: Chloraprep       Needle Type: short bevel and insulated       Needle Gauge: 21.        Needle Length (millimeters): 80        Ultrasound guided       1. Ultrasound was used to identify targeted nerve, plexus, vascular marker, or fascial plane and place a needle adjacent to it in real-time.       2. Ultrasound was used to visualize the spread of anesthetic in close proximity to the above referenced structure.       3. A permanent image is entered into the patient's record.    Assessment/Narrative         The placement was negative for: blood aspirated, painful injection and site bleeding       Paresthesias: No.       Bolus given via needle..        Secured via.        Insertion/Infusion Method: Single Shot       Complications: none    Medication(s) Administered   Bupivacaine 0.5% w/ 1:400K Epi (Injection) - Injection   15 mL - 10/3/2023 11:13:00 AM   Comments:  15 cc of 0.5% Bupivacaine with epinephrine (1:400K) injected on the anterior side of the femoral artery, in close proximity to the femoral nerve branches via the adductor canal approach.      Ultrasound Interpretation, Peripheral Nerve Block    1. Under ultrasound guidance, the needle was inserted and placed in close proximity  "to the target nerve(s).  2. Ultrasound was also used to visualize the spread of the anesthetic in close proximity to the nerve(s) being blocked.  Local anesthetic was administered in incremental doses, with intermittent negative aspiration.    3. The nerve(s) appeared anatomically normal.  4. There were no apparent abnormal pathological findings.  5. A permanent ultrasound image was saved in the patient's record.    Pt tolerated well.    No complications.      The surgeon has given a verbal order transferring care of this patient to me for the performance of a regional analgesia block for post-op pain control. It is requested of me because I am uniquely trained and qualified to perform this block and the surgeon is neither trained nor qualified to perform this procedure.          FOR [facility] ([facility]) ONLY:   Pain Team Contact information: please page the Pain Team Via Lif[employer]. Search \"Pain\". During daytime hours, please page the attending first. At night please page the resident first.      "

## 2023-10-04 ENCOUNTER — APPOINTMENT (OUTPATIENT)
Dept: PHYSICAL THERAPY | Facility: CLINIC | Age: 68
End: 2023-10-04
Attending: ORTHOPAEDIC SURGERY
Payer: COMMERCIAL

## 2023-10-04 ENCOUNTER — APPOINTMENT (OUTPATIENT)
Dept: OCCUPATIONAL THERAPY | Facility: CLINIC | Age: 68
End: 2023-10-04
Attending: ORTHOPAEDIC SURGERY
Payer: COMMERCIAL

## 2023-10-04 LAB
GLUCOSE BLDC GLUCOMTR-MCNC: 105 MG/DL (ref 70–99)
HGB BLD-MCNC: 10.1 G/DL (ref 11.7–15.7)

## 2023-10-04 PROCEDURE — 97530 THERAPEUTIC ACTIVITIES: CPT | Mod: GP | Performed by: PHYSICAL THERAPY ASSISTANT

## 2023-10-04 PROCEDURE — 36415 COLL VENOUS BLD VENIPUNCTURE: CPT | Performed by: ORTHOPAEDIC SURGERY

## 2023-10-04 PROCEDURE — 97110 THERAPEUTIC EXERCISES: CPT | Mod: GP | Performed by: PHYSICAL THERAPY ASSISTANT

## 2023-10-04 PROCEDURE — 97165 OT EVAL LOW COMPLEX 30 MIN: CPT | Mod: GO

## 2023-10-04 PROCEDURE — 250N000011 HC RX IP 250 OP 636: Performed by: ORTHOPAEDIC SURGERY

## 2023-10-04 PROCEDURE — 97116 GAIT TRAINING THERAPY: CPT | Mod: GP | Performed by: PHYSICAL THERAPY ASSISTANT

## 2023-10-04 PROCEDURE — 97535 SELF CARE MNGMENT TRAINING: CPT | Mod: GO

## 2023-10-04 PROCEDURE — 250N000013 HC RX MED GY IP 250 OP 250 PS 637: Performed by: ORTHOPAEDIC SURGERY

## 2023-10-04 PROCEDURE — 82962 GLUCOSE BLOOD TEST: CPT

## 2023-10-04 PROCEDURE — 85018 HEMOGLOBIN: CPT | Performed by: ORTHOPAEDIC SURGERY

## 2023-10-04 RX ADMIN — ACETAMINOPHEN 975 MG: 325 TABLET, FILM COATED ORAL at 00:27

## 2023-10-04 RX ADMIN — CELECOXIB 100 MG: 100 CAPSULE ORAL at 09:21

## 2023-10-04 RX ADMIN — HYDROMORPHONE HYDROCHLORIDE 2 MG: 2 TABLET ORAL at 07:41

## 2023-10-04 RX ADMIN — HYDROMORPHONE HYDROCHLORIDE 2 MG: 2 TABLET ORAL at 00:26

## 2023-10-04 RX ADMIN — POLYETHYLENE GLYCOL 3350 17 G: 17 POWDER, FOR SOLUTION ORAL at 09:22

## 2023-10-04 RX ADMIN — HYDROXYZINE HYDROCHLORIDE 10 MG: 10 TABLET ORAL at 12:55

## 2023-10-04 RX ADMIN — HYDROMORPHONE HYDROCHLORIDE 2 MG: 2 TABLET ORAL at 16:57

## 2023-10-04 RX ADMIN — HYDROMORPHONE HYDROCHLORIDE 2 MG: 2 TABLET ORAL at 12:33

## 2023-10-04 RX ADMIN — CEFAZOLIN 1 G: 1 INJECTION, POWDER, FOR SOLUTION INTRAMUSCULAR; INTRAVENOUS at 06:19

## 2023-10-04 RX ADMIN — HYDROXYZINE HYDROCHLORIDE 10 MG: 10 TABLET ORAL at 22:08

## 2023-10-04 RX ADMIN — SENNOSIDES AND DOCUSATE SODIUM 1 TABLET: 50; 8.6 TABLET ORAL at 09:21

## 2023-10-04 RX ADMIN — CELECOXIB 100 MG: 100 CAPSULE ORAL at 22:00

## 2023-10-04 RX ADMIN — ONDANSETRON 4 MG: 4 TABLET, ORALLY DISINTEGRATING ORAL at 19:15

## 2023-10-04 RX ADMIN — HYDROMORPHONE HYDROCHLORIDE 4 MG: 4 TABLET ORAL at 22:08

## 2023-10-04 RX ADMIN — ASPIRIN 81 MG: 81 TABLET, COATED ORAL at 09:22

## 2023-10-04 RX ADMIN — SENNOSIDES AND DOCUSATE SODIUM 1 TABLET: 50; 8.6 TABLET ORAL at 22:01

## 2023-10-04 RX ADMIN — ACETAMINOPHEN 975 MG: 325 TABLET, FILM COATED ORAL at 09:22

## 2023-10-04 RX ADMIN — ASPIRIN 81 MG: 81 TABLET, COATED ORAL at 22:00

## 2023-10-04 RX ADMIN — ACETAMINOPHEN 975 MG: 325 TABLET, FILM COATED ORAL at 16:58

## 2023-10-04 RX ADMIN — LISINOPRIL 20 MG: 20 TABLET ORAL at 09:22

## 2023-10-04 ASSESSMENT — ACTIVITIES OF DAILY LIVING (ADL)
ADLS_ACUITY_SCORE: 18
PREVIOUS_RESPONSIBILITIES: MEAL PREP;HOUSEKEEPING;LAUNDRY;SHOPPING;YARDWORK;MEDICATION MANAGEMENT;FINANCES;DRIVING
ADLS_ACUITY_SCORE: 18
DEPENDENT_IADLS:: INDEPENDENT
ADLS_ACUITY_SCORE: 18

## 2023-10-04 NOTE — PROGRESS NOTES
10/03/23 1900   Appointment Info   Signing Clinician's Name / Credentials (PT) Red Lr DPT   Rehab Comments (PT) WBAT RLE, ROM as jennifer   Quick Adds   Quick Adds Certification   Living Environment   People in Home alone   Current Living Arrangements house   Home Accessibility stairs within home   Number of Stairs, Within Home, Primary greater than 10 stairs   Stair Railings, Within Home, Primary railings safe and in good condition   Transportation Anticipated family or friend will provide   Living Environment Comments Pt lives in a house alone and has ~13 steps inside to bedroom. Pt can have daughter come stay for a week for assist but pt is fearful of returning home due to hx of syncope   Self-Care   Usual Activity Tolerance good   Current Activity Tolerance poor   Equipment Currently Used at Home none   Fall history within last six months no   Activity/Exercise/Self-Care Comment at baseline pt ind with all mobility and ADL's   General Information   Onset of Illness/Injury or Date of Surgery 10/03/23   Referring Physician Darshan Caban MD   Patient/Family Therapy Goals Statement (PT) pt fearful of returning home due to hx of fainting, wants to go to TCU   Pertinent History of Current Problem (include personal factors and/or comorbidities that impact the POC) Pt is a 68 y.o. female s/p R TKA on 10/3/2023, POD#0   Existing Precautions/Restrictions fall;weight bearing   Weight-Bearing Status - RLE weight-bearing as tolerated   General Observations Pt verbally anxious about movement before therapy even started   Cognition   Affect/Mental Status (Cognition) WFL;anxious   Orientation Status (Cognition) oriented x 4   Follows Commands (Cognition) WNL   Pain Assessment   Patient Currently in Pain Yes, see Vital Sign flowsheet  (pt reported pain up to 9/10 with movement in R knee)   Integumentary/Edema   Integumentary/Edema no deficits were identifed   Posture    Posture Not impaired   Range of Motion (ROM)    Range of Motion ROM deficits secondary to surgical procedure;ROM deficits secondary to pain   ROM Comment deficits with R knee post surgery due to pain, anxiety, with AROM ~0-30, otherwise appears grossly WFL   Strength (Manual Muscle Testing)   Strength (Manual Muscle Testing) Deficits observed during functional mobility;Able to perform L SLR   Strength Comments Not formally assessed, deficits with R knee after TKA   Bed Mobility   Comment, (Bed Mobility) supine<>sit SBA   Transfers   Comment, (Transfers) sit<>stand with FWW CGA   Gait/Stairs (Locomotion)   Comment, (Gait/Stairs) pt engaged in pre-gait standing in front of bed marching in place with FWW and CGA from therapist, steady throughout   Balance   Balance Comments good sitting and standing balance, deficits with dynamic balance when standing with FWW but no overt LOB   Sensory Examination   Sensory Perception Comments pt reports mild post surgical nunbness in RLE   Coordination   Coordination no deficits were identified   Clinical Impression   Criteria for Skilled Therapeutic Intervention Yes, treatment indicated   PT Diagnosis (PT) impaired gait   Influenced by the following impairments pain, deficits with ROM, strength, balance   Functional limitations due to impairments decreased ind with transfers, amb, ADL's   Clinical Presentation (PT Evaluation Complexity) Stable/Uncomplicated   Clinical Presentation Rationale PMH and clinical judgement   Clinical Decision Making (Complexity) low complexity   Planned Therapy Interventions (PT) balance training;bed mobility training;cryotherapy;gait training;home exercise program;patient/family education;ROM (range of motion);stair training;strengthening;stretching;transfer training;progressive activity/exercise   Anticipated Equipment Needs at Discharge (PT)   (pt has FWW)   Risk & Benefits of therapy have been explained evaluation/treatment results reviewed;current/potential barriers reviewed;care plan/treatment  "goals reviewed;risks/benefits reviewed;participants voiced agreement with care plan;participants included;patient;daughter   PT Total Evaluation Time   PT Eval, Low Complexity Minutes (05925) 10   Plan of Care Review   Plan of Care Reviewed With patient;daughter   Therapy Certification   Start of care date 10/03/23   Certification date from 10/03/23   Certification date to 10/10/23   Medical Diagnosis R TKA   Physical Therapy Goals   PT Frequency 2x/day   PT Predicted Duration/Target Date for Goal Attainment 10/04/23   PT Goals Bed Mobility;Transfers;Gait;Stairs   PT: Bed Mobility Modified independent;Supine to/from sit;Rolling;Bridging   PT: Transfers Modified independent;Sit to/from stand;Assistive device   PT: Gait Supervision/stand-by assist;Rolling walker;50 feet   PT: Stairs Minimal assist;Greater than 10 stairs   Interventions   Interventions Quick Adds Gait Training;Therapeutic Activity;Therapeutic Procedure   Therapeutic Procedure/Exercise   Ther. Procedure: strength, endurance, ROM, flexibillity Minutes (34445) 8   Symptoms Noted During/After Treatment increased pain;dizziness   Treatment Detail/Skilled Intervention Educated on post surgical benefits of TE on circulation, functional ROM, and strength. With pt in supine cued for AP's x 20, on RLE: quad sets x 8, heel slides x 8. Pt very anxious about movement, needed to stop several times due to pt reporting feeling \"she's gonna be down on the floor,\" tactile cues needed to guide pt through proper form for heel slides and properly engage quad   Therapeutic Activity   Therapeutic Activities: dynamic activities to improve functional performance Minutes (46167) 20   Symptoms Noted During/After Treatment Dizziness;Increased pain   Treatment Detail/Skilled Intervention Greeted pt supine in bed with daughter present. Eval completed. Pt was highly anxious throughout session after informing therapist of hx of syncope and anxiety about her surgery in general, " "therapeutic listening provided. Educated on orders for WBAT, ROM to flexion tolerance, and importance of keeping knee straight when resting and demonstrated how to do so. With HOB elevated supine<>sit x 3 SBA, min cueing for sequencing and scooting anteriorly to get hips closer to EOB for set up to perform STS. After first 2 attempts sitting EOB pt only able to dangle for several minutes before feeling overwhelmed and feeling \"light headed\" and pt laying down immediately, vitals taken throughout session and all VSS on RA. Cues provided throughout session for PLB during movement. Sit<>stand with FWW  CGA, cues for safe walker and hand placement and pt able to demo back well. With pt standing in FWW and CGA from therapist cue for pre-gait activity, weight shifting L/R and stepping in place for 30 sec, cues for bending R knee to clear foot off floor, pt able to clear both feet several inches, pt appeared steady and had heavy reliance on Ue's on walker. After marching in place pt felt \"light-headed\" and anxiety increased again and immediately sat and laid back down in bed. Pt encouraged by standing and stepping in place but declined further activity at this time.   Gait Training   Treatment Detail/Skilled Intervention pre-gait initiated see TA   PT Discharge Planning   PT Plan progress HEP, progress pre-gait to amb with FWW, safe transfers and bed mobility, trial stairs as able   PT Discharge Recommendation (DC Rec)   (defer to ortho)   PT Rationale for DC Rec Pt below baseline and was limited in therapy tonight from high anxiety levels, however pt was moving well with what she accomplished. Pt is very fearful of returning home due ot hx of reported syncope and fainting episodes and wants to go to TCU. Pt will be able to have daughter come stay with her for first week. If pt cannot progress in therapy due to anxiety  she may not be safe to return home. Anticipate pt encouragement pt can progress to Mod-I bed mobility, " Mod-I STS with FWW and SBA for amb with FWW. Pt does have steps at home and would need to be able to perform with Ruben   PT Brief overview of current status bed mobility SBA, STS with FWW CGA, pre-gait with FWW CGA   Total Session Time   Timed Code Treatment Minutes 28   Total Session Time (sum of timed and untimed services) 38       [facility]  OUTPATIENT PHYSICAL THERAPY EVALUATION  PLAN OF TREATMENT FOR OUTPATIENT REHABILITATION  (COMPLETE FOR INITIAL CLAIMS ONLY)  Patient's Last Name, First Name, M.I.  YOB: 1955  Nila Viveros                        Provider's Name  [facility] Medical Record No.  [id number]                             Onset Date:  10/03/23   Start of Care Date:  10/03/23   Type:     _X_PT   ___OT   ___SLP Medical Diagnosis:  R TKA              PT Diagnosis:  impaired gait Visits from SOC:  1     See note for plan of treatment, functional goals and certification details    I CERTIFY THE NEED FOR THESE SERVICES FURNISHED UNDER        THIS PLAN OF TREATMENT AND WHILE UNDER MY CARE     (Physician co-signature of this document indicates review and certification of the therapy plan).

## 2023-10-04 NOTE — PROGRESS NOTES
Patient vital signs are at baseline: Yes  Patient able to ambulate as they were prior to admission or with assist devices provided by therapies during their stay:  Yes, 1 assist GB/W  Patient MUST void prior to discharge:  Yes, ambulating to bathroom. Voiding well.  Patient able to tolerate oral intake:  Yes  Pain has adequate pain control using Oral analgesics:  Yes, managed with PRN Dilaudid.  Does patient have an identified :  Yes  Has goal D/C date and time been discussed with patient:  No,  Reason:  TBD. Pt interested in TCU.    AO x4. CMS intact. PIV SL. Dressing CDI, ice in place.

## 2023-10-04 NOTE — CONSULTS
Care Management Initial Consult    General Information  Assessment completed with: Patient, Patient  Type of CM/SW Visit: Initial Assessment    Primary Care Provider verified and updated as needed: Yes (Patient stated the one listed in chart is incorrect. PCP Castillo Euceda with [facility])   Readmission within the last 30 days: no previous admission in last 30 days      Reason for Consult: discharge planning  Advance Care Planning:            Communication Assessment  Patient's communication style: spoken language (English or Bilingual)    Hearing Difficulty or Deaf: no   Wear Glasses or Blind: no    Cognitive  Cognitive/Neuro/Behavioral: WDL                      Living Environment:   People in home: alone     Current living Arrangements: house      Able to return to prior arrangements:  ([facility] rec)       Family/Social Support:  Care provided by: self  Provides care for: no one  Marital Status: Single  Children          Description of Support System: Supportive, Involved    Support Assessment: Adequate family and caregiver support    Current Resources:   Patient receiving home care services: No     Community Resources: None  Equipment currently used at home: none  Supplies currently used at home: None    Employment/Financial:  Employment Status: retired        Financial Concerns: No concerns identified   Referral to Financial Worker: No       Does the patient's insurance plan have a 3 day qualifying hospital stay waiver?  No    Lifestyle & Psychosocial Needs:  Social Determinants of Health     Food Insecurity: Not on file   Depression: Not on file   Housing Stability: Not on file   Tobacco Use: Low Risk  (10/4/2023)    Patient History     Smoking Tobacco Use: Never     Smokeless Tobacco Use: Never     Passive Exposure: Not on file   Financial Resource Strain: Not on file   Alcohol Use: Not on file   Transportation Needs: Not on file   Physical Activity: Not on file   Interpersonal Safety: Not on file    Stress: Not on file   Social Connections: Not on file       Functional Status:  Prior to admission patient needed assistance:   Dependent ADLs:: Independent  Dependent IADLs:: Independent       Mental Health Status:  Mental Health Status: No Current Concerns       Chemical Dependency Status:  Chemical Dependency Status: No Current Concerns             Values/Beliefs:  Spiritual, Cultural Beliefs, Baptism Practices, Values that affect care: no               Additional Information:  CM initial Assessment    Writer was updated by PA that the patient was needing TCU. Writer met with the patient with daughter at bedside to complete discharge placement consult. Per chart, patient has discharge orders in.     Patient stated their PCP is Neris Euceda with [facility].     Patient stated the live in a house alone, with 13 stairs leading inside the home. Patient stated they do not have assistive equipment at home besides grab bars in the bathroom and railings in the staircase.     Patient stated she was independent in  all ADL's and IADL's.     Patient stated she had no fiancial concerns.     Writer provided patient with in network TCU facilities with [facility] and provided Education. Patient stated writer could send referral on behalf of her. Patient and daughter asked for referrals to be sent close to the [facility], and [place].     Writer send referrals to [facility] and [facility].     Writer got a call from [facility] stating that they can accept tomorrow. Writer asked for Auth to be initiated ASAP.     Writer will update family.         THUY Ortiz  [facility]  Social Work

## 2023-10-04 NOTE — PLAN OF CARE
Goal Outcome Evaluation:  .Patient vital signs are at baseline: Yes  Patient able to ambulate as they were prior to admission or with assist devices provided by therapies during their stay:  Yes  Patient MUST void prior to discharge:  Yes  Patient able to tolerate oral intake:  Yes  Pain has adequate pain control using Oral analgesics:  Yes  Does patient have an identified :  Yes  Has goal D/C date and time been discussed with patient:  Yes    [place] dressing applied to R knee incision, scant drainage noted. Denies N/T. Anxiety well managed today with PRN atarax, patient reports overall sense of better control of pain. [facility]

## 2023-10-04 NOTE — PROGRESS NOTES
10/04/23 0734   Appointment Info   Signing Clinician's Name / Credentials (OT) Agnieszka Yusuf OTR/L   Quick Adds   Quick Adds Certification   Living Environment   People in Home alone   Current Living Arrangements house   Transportation Anticipated family or friend will provide  (Pt typically drives)   Living Environment Comments Pt can have daughter come stay for a week for assist but pt is fearful of returning home due to hx of syncope.   Self-Care   Usual Activity Tolerance good   Current Activity Tolerance moderate   Regular Exercise Yes   Activity/Exercise Type swimming   Equipment Currently Used at Home shower chair;walker, standard   Activity/Exercise/Self-Care Comment Independent in all ADLs and mobility at baseline.   Instrumental Activities of Daily Living (IADL)   Previous Responsibilities meal prep;housekeeping;laundry;shopping;yardwork;medication management;finances;driving   General Information   Onset of Illness/Injury or Date of Surgery 10/03/23   Referring Physician Darshan Caban MD   Patient/Family Therapy Goal Statement (OT) Home   Additional Occupational Profile Info/Pertinent History of Current Problem Pt is a 68 y.o. female s/p R TKA on 10/3/2023, POD# 1. WBAT . see chart for details.   Cognitive Status Examination   Orientation Status orientation to person, place and time   Pain Assessment   Patient Currently in Pain Yes, see Vital Sign flowsheet   Transfers   Transfers bed-chair transfer;sit-stand transfer;toilet transfer   Transfer Skill: Bed to Chair/Chair to Bed   Bed-Chair [place] (Transfers) set up;verbal cues  (SBA)   Sit-Stand Transfer   Sit-Stand [place] (Transfers) set up;verbal cues  (SBA)   Toilet Transfer   Type (Toilet Transfer) stand-sit;sit-stand   [place] Level (Toilet Transfer) set up;verbal cues  (SBA)   Activities of Daily Living   BADL Assessment/Intervention lower body dressing   Lower Body Dressing Assessment/Training   [place] Level (Lower  Body Dressing) set up;verbal cues  (SBA)   Clinical Impression   Criteria for Skilled Therapeutic Interventions Met (OT) Yes, treatment indicated   OT Diagnosis Decreased independence in I/ADLs   Influenced by the following impairments Decreased independence in I/ADLs   OT Problem List-Impairments impacting ADL problems related to;activity tolerance impaired;pain   Assessment of Occupational Performance 1-3 Performance Deficits   Identified Performance Deficits Decreased independence in I/ADLs (Dressing, bathing, toileting)   Planned Therapy Interventions (OT) ADL retraining;IADL retraining;transfer training   Clinical Decision Making Complexity (OT) low complexity   Anticipated Equipment Needs Upon Discharge (OT) shower chair;raised toilet seat   Risk & Benefits of therapy have been explained evaluation/treatment results reviewed;care plan/treatment goals reviewed;risks/benefits reviewed;patient   OT Total Evaluation Time   OT Eval, Low Complexity Minutes (55755) 7   Therapy Certification   Medical Diagnosis TKA   Start of Care Date 10/04/23   OT Goals   Therapy Frequency (OT) One time eval and treatment   OT Predicted Duration/Target Date for Goal Attainment 10/04/23   OT Goals Hygiene/Grooming;Lower Body Dressing;Toilet Transfer/Toileting;OT Goal 1   OT: Hygiene/Grooming supervision/stand-by assist;while standing   OT: Lower Body Dressing Supervision/stand-by assist;using adaptive equipment   OT: Toilet Transfer/Toileting Supervision/stand-by assist;toilet transfer;cleaning and garment management   OT: Goal 1 Pt will verbalize understanding of 2 shower transfer techniques, in order to increase independence in I/ADLs.   Interventions   Interventions Quick Adds Self-Care/Home Management   Self-Care/Home Management   Self-Care/Home Mgmt/ADL, Compensatory, Meal Prep Minutes (11093) 23   Symptoms Noted During/After Treatment (Meal Preparation/Planning Training) fatigue   Treatment Detail/Skilled Intervention Pt  ambulated to the bathroom with SBA and walker for toilet transfer. Transferred to/from the toilet with SBA after 1 verbal cue on hand placement. Educated on safe functional transfers and safe hand placement, pt demonstrated carryover after education. Educated on lower body dressing with compensatory techniques and AE. While seated/standing pt completed LE dressing (don underwear, don/doff socks and don shoes) with SBA and set up, after education.  Educated on compensatory techniques for I/ADLs, pt verbalized understanding of 3 learned skills. Educated on shower transfer techniques, pt  verbalized understanding of 2 learned skills for shower transfer.  Educated on use of a RTS (with armrests) and shower chair and where to attain, pt verbalized understanding. IP OT goals met.   OT Discharge Planning   OT Rationale for DC Rec Assist in I/ADLs as needed, including SBA for LE dressing and shower transfers. Recommend a RTS with armrests and implementation of a shower chair.   OT Brief overview of current status SBA LE dressing   Total Session Time   Timed Code Treatment Minutes 23   Total Session Time (sum of timed and untimed services) 30      [facility]  OUTPATIENT OCCUPATIONAL THERAPY  EVALUATION  PLAN OF TREATMENT FOR OUTPATIENT REHABILITATION  (COMPLETE FOR INITIAL CLAIMS ONLY)  Patient's Last Name, First Name, M.I.  YOB: 1955  Nila Viveros                          Provider's Name  [facility] Medical Record No.  [id number]                             Onset Date:  10/03/23   Start of Care Date:  10/04/23   Type:     ___PT   _X_OT   ___SLP Medical Diagnosis:  TKA                    OT Diagnosis:  Decreased independence in I/ADLs Visits from SOC:  1     See note for plan of treatment, functional goals and certification details    I CERTIFY THE NEED FOR THESE SERVICES FURNISHED UNDER        THIS PLAN OF TREATMENT AND WHILE UNDER MY CARE      (Physician co-signature of this document indicates review and certification of the therapy plan).

## 2023-10-04 NOTE — PROGRESS NOTES
Care Management Follow Up    Length of Stay (days): 0    Expected Discharge Date: 10/04/2023     Concerns to be Addressed: all concerns addressed in this encounter     Patient plan of care discussed at interdisciplinary rounds: Yes    Anticipated Discharge Disposition: Skilled Nursing Facility, Transitional Care     Anticipated Discharge Services: None  Anticipated Discharge DME: None    Patient/family educated on Medicare website which has current facility and service quality ratings: yes  Education Provided on the Discharge Plan: Yes  Patient/Family in Agreement with the Plan: yes    Referrals Placed by CM/SW:    Private pay costs discussed: Not applicable    Additional Information:  PAS completed.  PAS-RR    D: Per DHS regulation, SW completed and submitted PAS-RR to [place] [employer] Direct Connect via the Senior LinkAge Line.  PAS-RR confirmation # is : [id number]    I: SW spoke with patient and they are aware a PAS-RR has been submitted.  SW reviewed with patient that they may be contacted for a follow up appointment within 10 days of hospital discharge if their SNF stay is < 30 days.  Contact information for [facility] LinkAge Line was also provided.    A: patient verbalized understanding.    P: Further questions may be directed to [facility] LinkAge Line at #[phone], option #4 for PAS-RR staff.    Marianne at [facility] stated they can take the patient at 1300 tomorrow.     Writer will update family.     Addendum 1640:  Writer got an update from Marianne that they can for sure admit the patient tomorrow in a semi private room and their SW is confident  a private will be available tomorrow.      Patient stated they wanted private.    SW to follow on admit time.    THUY Ortiz  [facility]  Social Work

## 2023-10-04 NOTE — PROGRESS NOTES
"ORTHOPEDIC LOWER EXTREMITY PROGRESS NOTE    POD#1  Patient is a 68 year old female who underwent Procedure(s):  RIGHT TOTAL KNEE ARTHROPLASTY on 10/3/2023. Pain is well controlled. Tolerating medication well, no nausea or vomiting.  Slow to progress in therapy, unable to complete the stairs.  Patient feels lightheaded and dizzy when up and moving.  She has 13 stairs to enter her home and would like to discharge to TCU.     Vitals:   Blood pressure 138/78, pulse 76, temperature 98  F (36.7  C), temperature source Oral, resp. rate 15, height 1.626 m (5' 4\"), weight 68.3 kg (150 lb 8 oz), SpO2 98 %.  Temp (24hrs), Av.8  F (36.6  C), Min:97  F (36.1  C), Max:98.1  F (36.7  C)      Drains: None    EXAM   The patient is awake and alert.  Calves are soft and non-tender.   Sensation is intact.  Dorsiflexion and plantar flexion is intact.  Foot warm with nl cap refill.  The incision is covered with island dressing .     Labs:   Recent Labs   Lab Test 10/04/23  0631   HGB 10.1*       ASSESSMENT  S/p R TKA   PLAN  1. DVT prophylaxis: aspirin  2. Weight Bearing: WBAT (Weight bearing as tolerated).  3. Anticipated discharge date pending placement . Discharge to Skilled Nursing Facility, social work consulted, appreciate assistance .  4. Cont Pain Control Tylenol, Dilaudid, and Celebrex    Emani Ayala PA-C  [facility]        "

## 2023-10-04 NOTE — PLAN OF CARE
Occupational Therapy Discharge Summary    Reason for therapy discharge:    All goals and outcomes met, no further needs identified.    Progress towards therapy goal(s). See goals on Care Plan in [facility] electronic health record for goal details.  Goals met    Therapy recommendation(s):    Assist in I/ADLs as needed, including SBA for LE dressing and shower transfers.

## 2023-10-05 ENCOUNTER — LAB REQUISITION (OUTPATIENT)
Dept: LAB | Facility: CLINIC | Age: 68
End: 2023-10-05

## 2023-10-05 VITALS
HEART RATE: 50 BPM | DIASTOLIC BLOOD PRESSURE: 87 MMHG | BODY MASS INDEX: 25.7 KG/M2 | OXYGEN SATURATION: 97 % | WEIGHT: 150.5 LBS | TEMPERATURE: 97.9 F | SYSTOLIC BLOOD PRESSURE: 150 MMHG | RESPIRATION RATE: 16 BRPM | HEIGHT: 64 IN

## 2023-10-05 VITALS
SYSTOLIC BLOOD PRESSURE: 161 MMHG | RESPIRATION RATE: 18 BRPM | TEMPERATURE: 97.9 F | OXYGEN SATURATION: 97 % | DIASTOLIC BLOOD PRESSURE: 90 MMHG | HEART RATE: 67 BPM

## 2023-10-05 DIAGNOSIS — Z00.01 ENCOUNTER FOR GENERAL ADULT MEDICAL EXAMINATION WITH ABNORMAL FINDINGS: ICD-10-CM

## 2023-10-05 LAB
GLUCOSE BLDC GLUCOMTR-MCNC: 117 MG/DL (ref 70–99)
HGB BLD-MCNC: 9.8 G/DL (ref 11.7–15.7)

## 2023-10-05 PROCEDURE — 82962 GLUCOSE BLOOD TEST: CPT

## 2023-10-05 PROCEDURE — 85018 HEMOGLOBIN: CPT | Performed by: ORTHOPAEDIC SURGERY

## 2023-10-05 PROCEDURE — 36415 COLL VENOUS BLD VENIPUNCTURE: CPT | Performed by: ORTHOPAEDIC SURGERY

## 2023-10-05 PROCEDURE — 250N000011 HC RX IP 250 OP 636: Performed by: ORTHOPAEDIC SURGERY

## 2023-10-05 PROCEDURE — 250N000013 HC RX MED GY IP 250 OP 250 PS 637: Performed by: ORTHOPAEDIC SURGERY

## 2023-10-05 RX ORDER — ONDANSETRON 2 MG/ML
4 INJECTION INTRAMUSCULAR; INTRAVENOUS EVERY 6 HOURS PRN
Qty: 20 ML | Refills: 1 | Status: SHIPPED | OUTPATIENT
Start: 2023-10-05 | End: 2023-10-05

## 2023-10-05 RX ORDER — ONDANSETRON 4 MG/1
4 TABLET, FILM COATED ORAL EVERY 8 HOURS PRN
Qty: 20 TABLET | Refills: 1 | Status: SHIPPED | OUTPATIENT
Start: 2023-10-05 | End: 2023-10-11

## 2023-10-05 RX ADMIN — HYDROMORPHONE HYDROCHLORIDE 4 MG: 4 TABLET ORAL at 08:43

## 2023-10-05 RX ADMIN — ACETAMINOPHEN 975 MG: 325 TABLET, FILM COATED ORAL at 01:28

## 2023-10-05 RX ADMIN — ASPIRIN 81 MG: 81 TABLET, COATED ORAL at 09:49

## 2023-10-05 RX ADMIN — CELECOXIB 100 MG: 100 CAPSULE ORAL at 09:49

## 2023-10-05 RX ADMIN — ONDANSETRON 4 MG: 4 TABLET, ORALLY DISINTEGRATING ORAL at 08:10

## 2023-10-05 RX ADMIN — SENNOSIDES AND DOCUSATE SODIUM 1 TABLET: 50; 8.6 TABLET ORAL at 09:49

## 2023-10-05 RX ADMIN — HYDROMORPHONE HYDROCHLORIDE 4 MG: 4 TABLET ORAL at 12:46

## 2023-10-05 RX ADMIN — ACETAMINOPHEN 975 MG: 325 TABLET, FILM COATED ORAL at 08:09

## 2023-10-05 RX ADMIN — POLYETHYLENE GLYCOL 3350 17 G: 17 POWDER, FOR SOLUTION ORAL at 09:49

## 2023-10-05 RX ADMIN — LISINOPRIL 20 MG: 20 TABLET ORAL at 09:49

## 2023-10-05 ASSESSMENT — ACTIVITIES OF DAILY LIVING (ADL)
ADLS_ACUITY_SCORE: 18

## 2023-10-05 NOTE — PLAN OF CARE
Goal Outcome Evaluation: Observation Goals  ~ Patient vital signs are at baseline: met  ~ Patient able to ambulate as they were prior to admission or with assist devices provided by therapies during their stay: met  ~ Patient MUST void prior to discharge: met  ~ Patient able to tolerate oral intake to maintain hydration status: met  ~ Patient has adequate pain control using Oral analgesics: met  ~ Hypercapnia, hypoventilation or hypoxia resolved for at least 2 hours without supplemental oxygen: met  ~ Deficits in sensation, mobility or coordination have resolved if spinal or regional anesthesia was used: met  ~ Patient has returned to baseline mental status: met  ~ Notify Provider IF patient FAILS to meet Discharge Goal criteria: n/a

## 2023-10-05 NOTE — PROGRESS NOTES
"ORTHOPEDIC LOWER EXTREMITY PROGRESS NOTE    POD#2  Patient is a 68 year old female who underwent Procedure(s):  RIGHT TOTAL KNEE ARTHROPLASTY on 10/3/2023. Pain is well controlled. Tolerating medication well, no nausea or vomiting.   Resting comfortably in bed. Plan today for TCU.     Vitals:   Blood pressure 138/71, pulse 72, temperature 98.1  F (36.7  C), temperature source Axillary, resp. rate 16, height 1.626 m (5' 4\"), weight 68.3 kg (150 lb 8 oz), SpO2 99 %.  Temp (24hrs), Av.8  F (36.6  C), Min:97  F (36.1  C), Max:98.1  F (36.7  C)      Drains: None    EXAM   The patient is awake and alert.  Calves are soft and non-tender.   Sensation is intact.  Dorsiflexion and plantar flexion is intact.  Foot warm with nl cap refill.  The incision is covered with island dressing . Ice wrap in place.    Labs:   Recent Labs   Lab Test 10/04/23  0631   HGB 10.1*         ASSESSMENT  S/p R TKA   PLAN  1. DVT prophylaxis: aspirin  2. Weight Bearing: WBAT (Weight bearing as tolerated).  3. Plan for discharge at 1300 to TCU  4. Cont Pain Control Tylenol, Dilaudid, and Celebrex    Ava Vargas PA-C  [facility]        "

## 2023-10-05 NOTE — PLAN OF CARE
Goal Outcome Evaluation: Observation Goals  ~ Patient vital signs are at baseline: met  ~ Patient able to ambulate as they were prior to admission or with assist devices provided by therapies during their stay: met  ~ Patient MUST void prior to discharge: met  ~ Patient able to tolerate oral intake to maintain hydration status: met  ~ Patient has adequate pain control using Oral analgesics: met  ~ Hypercapnia, hypoventilation or hypoxia resolved for at least 2 hours without supplemental oxygen: met  ~ Deficits in sensation, mobility or coordination have resolved if spinal or regional anesthesia was used: met  ~ Patient has returned to baseline mental status: met  ~ Notify Provider IF patient FAILS to meet Discharge Goal criteria: n/a     Orientations: A&Ox4  Vitals/Pain: VSS on RA. Pain controlled with Tylenol and PO dilaudid  Tele: n/a  Lines/Drains: n/a  Skin/Wounds: R knee incision. Sterile dressing change completed.   GI/: Reg diet. Adequate UOP. Last BM yesterday evening.   Labs: Abnormal/Trends, Electrolyte Replacement- none  Ambulation/Assist: SBA w/ walker  Sleep Quality: Good  [facility] Daughter to transport pt. All discharge paperwork/ script reviewed and sent with pt. Questions encouraged and answered.

## 2023-10-05 NOTE — PROGRESS NOTES
Care Management Discharge Note    Discharge Date: 10/05/2023       Discharge Disposition: Skilled Nursing Facility, Transitional Care    Discharge Services: None    Discharge DME: None    Discharge Transportation: family or friend will provide    Private pay costs discussed: Not applicable    Does the patient's insurance plan have a 3 day qualifying hospital stay waiver?  No    PAS Confirmation Code: [id number]  Patient/family educated on Medicare website which has current facility and service quality ratings: yes    Education Provided on the Discharge Plan: Yes  Persons Notified of Discharge Plans: Huc, bedside RN, Charge RN, Charge to notify doctor, pt, family, and raul Lopes.  Patient/Family in Agreement with the Plan: yes    Handoff Referral Completed: No    Additional Information:  Writer spoke with Marianne from [facility] who states they are able to accept pt today. Marianne states that pt will have a semi private room meaning that pt will have a room with door that closes but will have to go down the [place] to use bathroom that is shared with another female resident. Marianne states they also received approved prior auth.     Writer spoke with bedside RN who will reach out to doctor to get orders signed for today.    Writer met with pt at bedside. Pt's daughter Katherine is also present. Writer updated them of semi private room as listed above by Marianne. Pt and Katherine are agreeable to attend this and are aware bathroom will be shared. Pt and Katherine state that they will plan to have Katherine and another daughter transport pt at 1pm over the [facility] to [facility]. Pt and Katherine state no questions or concerns at this time.     Writer updated Huc, charge, and bedside rn of plan for 1pm if discharge orders completed by doctor.     Genevieve Ross, ADANW  Social Work  [facility]

## 2023-10-05 NOTE — PLAN OF CARE
Goal Outcome Evaluation:  Patient vital signs are at baseline: yes  Patient able to ambulate as they were prior to admission or with assist devices provided by therapies during their stay:  yes  Patient MUST void prior to discharge:  yes  Patient able to tolerate oral intake:  yes  Pain has adequate pain control using Oral analgesics:  yes  Does patient have an identified :  yes  Has goal D/C date and time been discussed with patient:    Alert and oriented X4. VSS on RA. Pain managed with prn po dilaudid.

## 2023-10-06 ENCOUNTER — TRANSITIONAL CARE UNIT VISIT (OUTPATIENT)
Dept: GERIATRICS | Facility: CLINIC | Age: 68
End: 2023-10-06
Payer: COMMERCIAL

## 2023-10-06 DIAGNOSIS — M17.0 OSTEOARTHRITIS OF BOTH KNEES, UNSPECIFIED OSTEOARTHRITIS TYPE: ICD-10-CM

## 2023-10-06 DIAGNOSIS — Z96.651 S/P TOTAL KNEE ARTHROPLASTY, RIGHT: Primary | ICD-10-CM

## 2023-10-06 DIAGNOSIS — G47.00 INSOMNIA, UNSPECIFIED TYPE: ICD-10-CM

## 2023-10-06 DIAGNOSIS — I10 HYPERTENSION, UNSPECIFIED TYPE: ICD-10-CM

## 2023-10-06 DIAGNOSIS — G89.18 POSTOPERATIVE PAIN: ICD-10-CM

## 2023-10-06 DIAGNOSIS — Z29.9 ENCOUNTER FOR DEEP VEIN THROMBOSIS (DVT) PROPHYLAXIS: ICD-10-CM

## 2023-10-06 DIAGNOSIS — E55.9 VITAMIN D DEFICIENCY: ICD-10-CM

## 2023-10-06 DIAGNOSIS — R53.81 PHYSICAL DECONDITIONING: ICD-10-CM

## 2023-10-06 DIAGNOSIS — J34.89 NASAL MUCOSA DRY: ICD-10-CM

## 2023-10-06 DIAGNOSIS — R11.0 NAUSEA: ICD-10-CM

## 2023-10-06 DIAGNOSIS — K59.01 SLOW TRANSIT CONSTIPATION: ICD-10-CM

## 2023-10-06 DIAGNOSIS — Z71.89 ADVANCED DIRECTIVES, COUNSELING/DISCUSSION: ICD-10-CM

## 2023-10-06 PROCEDURE — 99309 SBSQ NF CARE MODERATE MDM 30: CPT | Performed by: NURSE PRACTITIONER

## 2023-10-06 PROCEDURE — 86481 TB AG RESPONSE T-CELL SUSP: CPT | Performed by: NURSE PRACTITIONER

## 2023-10-06 PROCEDURE — 36415 COLL VENOUS BLD VENIPUNCTURE: CPT | Performed by: NURSE PRACTITIONER

## 2023-10-06 PROCEDURE — P9604 ONE-WAY ALLOW PRORATED TRIP: HCPCS | Performed by: NURSE PRACTITIONER

## 2023-10-06 NOTE — PROGRESS NOTES
[facility] GERIATRICS    PRIMARY CARE PROVIDER AND CLINIC:  Sharonda Prince RN, None  Chief Complaint   Patient presents with    Hospital F/U      [facility] Medical Record Number:  [id number]  Place of Service where encounter took place:  [facility] (TCU) [[id number]]    Nila Viveros  is a 68 year old  (1955), admitted to the above facility from  [facility]. Hospital stay 10/3/23 through 10/5/23..   HPI:    68 year old female with a history of hypertension and osteoarthritis who underwent right knee replacement for treatment of osteoarthritis of right knee on 10/3/23. Post-op, pain well tolerated with tylenol, dilaudid, and Celebrex. Aspirin for DVT prophylaxis. WBAT on RLE. TCU for rehab.     Seen today for initial visit. States she feels like she has more energy today and slept well overnight. Denies headache, chest pain or discomfort or shortness of breath. States she has not had a bowel movement in several days but is voiding well. Pain managed adequately. BP elevated with ranges 154-161/88-91, HR 67-84, afebrile, SATs 98% on RA.  Asks to be Full Code    CODE STATUS/ADVANCE DIRECTIVES DISCUSSION:  Prior  CPR/Full code   ALLERGIES:   Allergies   Allergen Reactions    Azithromycin Hives    Bee Venom Hives    Zinc Nausea    Nickel Rash    Silver Rash      PAST MEDICAL HISTORY:   Past Medical History:   Diagnosis Date    Degenerative arthritis of knee, bilateral     Giardia     has intermittent flares of diarrhea    H/O vein stripping 2006    Right leg    Hx of prior ablation treatment 2008    Lap ablation for uterine fibroids    Hypertension     Infection due to 2019 novel coronavirus     Knee mass, right     Pap smear for cervical cancer screening     S/P LEEP     S/P right knee arthroscopy     S/P tubal ligation 1988      PAST SURGICAL HISTORY:   has a past surgical history that includes Arthroplasty knee (Right, 10/3/2023).  FAMILY HISTORY: family history is not on  file.  SOCIAL HISTORY:   reports that she has never smoked. She has never used smokeless tobacco. She reports that she does not currently use alcohol. She reports that she does not use drugs.  Patient's living condition: lives alone    Post Discharge Medication Reconciliation Status:   MED REC REQUIRED  Post Medication Reconciliation Status: discharge medications reconciled and changed, per note/orders       Current Outpatient Medications   Medication Sig    acetaminophen (TYLENOL) 325 MG tablet Take 2 tablets (650 mg) by mouth every 4 hours as needed for other (mild pain)    aspirin 81 MG EC tablet Take 1 tablet (81 mg) by mouth 2 times daily    celecoxib (CELEBREX) 200 MG capsule Take 1 capsule (200 mg) by mouth daily for 14 days Do not take within 6 hours of ibuprofen (MOTRIN, ADVIL) or ketorolac (TORADOL) if prescribed.    cholecalciferol (VITAMIN D3) 125 mcg (5000 units) capsule Take 125 mcg by mouth daily    EPINEPHrine (ANY BX GENERIC EQUIV) 0.3 MG/0.3ML injection 2-pack Inject 0.3 mg into the muscle as needed for anaphylaxis May repeat one time in 5-15 minutes if response to initial dose is inadequate.    FLUTICASONE PROPIONATE, NASAL, NA Spray 1 spray in nostril daily as needed    HYDROmorphone (DILAUDID) 2 MG tablet Take 1-2 tablets (2-4 mg) by mouth every 4 hours as needed for moderate to severe pain 1 if pain rated 1-5 & 2 if 6-10.    lisinopril (ZESTRIL) 20 MG tablet Take 1 tablet by mouth daily    melatonin 3 MG tablet Take 3 mg by mouth nightly as needed for sleep    Misc Natural Products (OSTEO BI-FLEX ADV JOINT SHIELD) TABS Take 1,500 mg by mouth daily    ondansetron (ZOFRAN) 4 MG tablet Take 1 tablet (4 mg) by mouth every 8 hours as needed for nausea    Probiotic Product (PROBIOTIC DAILY) CAPS Take 1 capsule by mouth daily    senna-docusate (SENOKOT-S/PERICOLACE) 8.6-50 MG tablet Take 1-2 tablets by mouth 2 times daily Take while on oral narcotics to prevent or treat constipation.    Ubiquinol 100  MG CAPS Take 100 mg by mouth daily     No current facility-administered medications for this visit.       ROS:  10 point ROS of systems including Constitutional, Eyes, Respiratory, Cardiovascular, Gastroenterology, Genitourinary, Integumentary, Musculoskeletal, Psychiatric were all negative except for pertinent positives noted in my HPI.    Vitals:  BP (!) 161/90   Pulse 67   Temp 97.9  F (36.6  C)   Resp 18   SpO2 97%   Exam:  GENERAL APPEARANCE:  Alert, in no distress, pleasant, cooperative, oriented x 4  EYES:  EOM, lids, pupils and irises normal, sclera clear and conjunctiva normal, no discharge or mattering on lids or lashes noted  ENT:  Mouth normal, moist mucous membranes, nose normal without drainage or crusting, external ears without lesions, hearing acuity intact  NECK: supple, symmetrical, trachea midline  RESP:  respiratory effort normal, no chest wall tenderness, no respiratory distress, Lung sounds clear, patient is on room air  CV:  Auscultation of heart done, rate and rhythm controlled and regular, no murmur, no rub or gallop. Edema trace right LE  ABDOMEN: hypoactive bowel sounds, soft, nontender, no palpable masses.  M/S:   Gait and station walks with assist , no tenderness or swelling of the joints; able to move all extremities, digits normal  SKIN:  Inspection and palpation of skin and subcutaneous tissue: skin on extremities warm, dry and intact without rashes  NEURO: cranial nerves 2-12 grossly intact, no facial asymmetry, no speech deficits and able to follow directions, moves all extremities symmetrically  PSYCH:  insight and judgement and memory intact, affect and mood normal     Lab/Diagnostic data:  9/11/23 Cr 0.68, Hgb 12.7    ASSESSMENT/PLAN:  S/P total knee arthroplasty, right  Osteoarthritis of both knees, unspecified osteoarthritis type  Postoperative pain  Encounter for deep vein thrombosis (DVT) prophylaxis  Physical deconditioning  Acute, s/p surgery. Change tylenol to 650 mg  TID and TID PRN. Continue Aspirin, Celebrex and dilaudid 2-4 mg q4h PRN. Last Hgb 12.7 on 9/11, recheck CBC on 10/9. Therapies as ordered and ortho f/u as planned.     Hypertension, unspecified type  Chronic, stable. Continue lisinopril 20mg once daily, consider increasing dose if BP remains elevated. Last Cr 0.68 on 9/11, recheck BMP on 10/9. Monitor vs.     Nausea  Episodic. Continue ondansetron 4mg q8h PRN    Slow transit constipation  Acute. Change senna-s to scheduled 2 tablet BID. Staff to update provider if not effective.     Insomnia, unspecified type  Chronic, stable. Continue melatonin 3mg as PTA.     Vitamin D deficiency  Continue Vitamin D supplement    Nasal mucosa dry  Continue fluticasone nasal spray     Advanced directives, counseling/discussion  Full Code    Orders:  Full Code  CBC and BMP on 10/9 diagnosis anemia, HTN  Tylenol 650 mg PO TID and TID PRN pain  Change senna s to 2 tabs PO BID diagnosis constipation  Patient seen along with HA Chen student     I was present with the student who particpated in the serivce and documentation of the note. I have verified the history and personally peformed the physical exam and medical decision making. I agree with the assessment and plan of care as documented in the note.      Electronically signed by HA Mares, GIANNI

## 2023-10-08 ENCOUNTER — LAB REQUISITION (OUTPATIENT)
Dept: LAB | Facility: CLINIC | Age: 68
End: 2023-10-08

## 2023-10-08 DIAGNOSIS — I10 ESSENTIAL (PRIMARY) HYPERTENSION: ICD-10-CM

## 2023-10-08 DIAGNOSIS — D64.9 ANEMIA, UNSPECIFIED: ICD-10-CM

## 2023-10-08 LAB
GAMMA INTERFERON BACKGROUND BLD IA-ACNC: 0 IU/ML
M TB IFN-G BLD-IMP: NEGATIVE
M TB IFN-G CD4+ BCKGRND COR BLD-ACNC: 1.47 IU/ML
MITOGEN IGNF BCKGRD COR BLD-ACNC: 0 IU/ML
MITOGEN IGNF BCKGRD COR BLD-ACNC: 0 IU/ML
QUANTIFERON MITOGEN: 1.47 IU/ML
QUANTIFERON NIL TUBE: 0 IU/ML
QUANTIFERON TB1 TUBE: 0 IU/ML
QUANTIFERON TB2 TUBE: 0

## 2023-10-09 LAB
ANION GAP SERPL CALCULATED.3IONS-SCNC: 11 MMOL/L (ref 7–15)
BUN SERPL-MCNC: 12.4 MG/DL (ref 8–23)
CALCIUM SERPL-MCNC: 10.9 MG/DL (ref 8.8–10.2)
CHLORIDE SERPL-SCNC: 104 MMOL/L (ref 98–107)
CREAT SERPL-MCNC: 0.59 MG/DL (ref 0.51–0.95)
DEPRECATED HCO3 PLAS-SCNC: 25 MMOL/L (ref 22–29)
EGFRCR SERPLBLD CKD-EPI 2021: >90 ML/MIN/1.73M2
ERYTHROCYTE [DISTWIDTH] IN BLOOD BY AUTOMATED COUNT: 13.5 % (ref 10–15)
GLUCOSE SERPL-MCNC: 93 MG/DL (ref 70–99)
HCT VFR BLD AUTO: 32.7 % (ref 35–47)
HGB BLD-MCNC: 10.5 G/DL (ref 11.7–15.7)
MCH RBC QN AUTO: 30.5 PG (ref 26.5–33)
MCHC RBC AUTO-ENTMCNC: 32.1 G/DL (ref 31.5–36.5)
MCV RBC AUTO: 95 FL (ref 78–100)
PLATELET # BLD AUTO: 369 10E3/UL (ref 150–450)
POTASSIUM SERPL-SCNC: 4.5 MMOL/L (ref 3.4–5.3)
RBC # BLD AUTO: 3.44 10E6/UL (ref 3.8–5.2)
SODIUM SERPL-SCNC: 140 MMOL/L (ref 135–145)
WBC # BLD AUTO: 7.9 10E3/UL (ref 4–11)

## 2023-10-09 PROCEDURE — 36415 COLL VENOUS BLD VENIPUNCTURE: CPT | Performed by: NURSE PRACTITIONER

## 2023-10-09 PROCEDURE — 85027 COMPLETE CBC AUTOMATED: CPT | Performed by: NURSE PRACTITIONER

## 2023-10-09 PROCEDURE — 80048 BASIC METABOLIC PNL TOTAL CA: CPT | Performed by: NURSE PRACTITIONER

## 2023-10-09 PROCEDURE — P9604 ONE-WAY ALLOW PRORATED TRIP: HCPCS | Performed by: NURSE PRACTITIONER

## 2023-10-11 ENCOUNTER — DISCHARGE SUMMARY NURSING HOME (OUTPATIENT)
Dept: GERIATRICS | Facility: CLINIC | Age: 68
End: 2023-10-11
Payer: COMMERCIAL

## 2023-10-11 VITALS
RESPIRATION RATE: 18 BRPM | HEART RATE: 77 BPM | WEIGHT: 154 LBS | HEIGHT: 64 IN | TEMPERATURE: 98.2 F | BODY MASS INDEX: 26.29 KG/M2 | OXYGEN SATURATION: 98 % | DIASTOLIC BLOOD PRESSURE: 88 MMHG | SYSTOLIC BLOOD PRESSURE: 146 MMHG

## 2023-10-11 DIAGNOSIS — Z96.651 S/P TOTAL KNEE ARTHROPLASTY, RIGHT: ICD-10-CM

## 2023-10-11 DIAGNOSIS — R11.0 NAUSEA: ICD-10-CM

## 2023-10-11 DIAGNOSIS — I10 HYPERTENSION, UNSPECIFIED TYPE: ICD-10-CM

## 2023-10-11 DIAGNOSIS — G89.18 POSTOPERATIVE PAIN: ICD-10-CM

## 2023-10-11 DIAGNOSIS — E55.9 VITAMIN D DEFICIENCY: ICD-10-CM

## 2023-10-11 DIAGNOSIS — Z29.9 ENCOUNTER FOR DEEP VEIN THROMBOSIS (DVT) PROPHYLAXIS: ICD-10-CM

## 2023-10-11 DIAGNOSIS — R21 RASH: ICD-10-CM

## 2023-10-11 DIAGNOSIS — Z96.651 S/P TOTAL KNEE ARTHROPLASTY, RIGHT: Primary | ICD-10-CM

## 2023-10-11 DIAGNOSIS — M17.0 OSTEOARTHRITIS OF BOTH KNEES, UNSPECIFIED OSTEOARTHRITIS TYPE: ICD-10-CM

## 2023-10-11 DIAGNOSIS — R53.81 PHYSICAL DECONDITIONING: ICD-10-CM

## 2023-10-11 DIAGNOSIS — R11.0 NAUSEA: Primary | ICD-10-CM

## 2023-10-11 DIAGNOSIS — J34.89 NASAL MUCOSA DRY: ICD-10-CM

## 2023-10-11 DIAGNOSIS — K59.01 SLOW TRANSIT CONSTIPATION: ICD-10-CM

## 2023-10-11 DIAGNOSIS — G47.00 INSOMNIA, UNSPECIFIED TYPE: ICD-10-CM

## 2023-10-11 PROCEDURE — 99316 NF DSCHRG MGMT 30 MIN+: CPT | Performed by: NURSE PRACTITIONER

## 2023-10-11 RX ORDER — TRIAMCINOLONE ACETONIDE 1 MG/G
CREAM TOPICAL 2 TIMES DAILY
COMMUNITY
End: 2023-12-22

## 2023-10-11 RX ORDER — HYDROMORPHONE HYDROCHLORIDE 2 MG/1
2-4 TABLET ORAL EVERY 4 HOURS PRN
Qty: 10 TABLET | Refills: 0 | Status: SHIPPED | OUTPATIENT
Start: 2023-10-11 | End: 2023-12-22

## 2023-10-11 RX ORDER — ONDANSETRON 4 MG/1
4 TABLET, FILM COATED ORAL EVERY 8 HOURS PRN
Qty: 15 TABLET | Refills: 0 | Status: SHIPPED | OUTPATIENT
Start: 2023-10-11

## 2023-10-11 RX ORDER — ONDANSETRON 4 MG/1
4 TABLET, FILM COATED ORAL EVERY 8 HOURS PRN
COMMUNITY
End: 2023-10-11

## 2023-10-11 NOTE — PROGRESS NOTES
[facility] GERIATRICS DISCHARGE SUMMARY  PATIENT'S NAME: Nila Viveros  YOB: 1955  MEDICAL RECORD NUMBER:  [id number]  Place of Service where encounter took place:  [facility] ([facility]) [[id number]]    PRIMARY CARE PROVIDER AND CLINIC RESPONSIBLE AFTER TRANSFER:   Sharonda Prince, RN, None    Non-FMG Provider     Transferring providers: HA Mares CNP, Sly Gomes MD  Recent Hospitalization/ED:  [facility] stay 10/3/23 to 10/5/23.  Date of SNF Admission: October 05, 2023  Date of SNF (anticipated) Discharge: October 13, 2023  Discharged to: previous independent home  Cognitive Scores: SLUMS: 28/30  Physical Function: Ambulating 300 ft with 2WW  DME: No new DME needed    CODE STATUS/ADVANCE DIRECTIVES DISCUSSION:  Prior   ALLERGIES: Azithromycin, Bee venom, Zinc, Nickel, and Silver    NURSING FACILITY COURSE   Medication Changes/Rationale:   Stop senna s  Add Kenalog cream due to rash    Per recent TCU provider progress notes:   68 year old female with a history of hypertension and osteoarthritis who underwent right knee replacement for treatment of osteoarthritis of right knee on 10/3/23. Post-op, pain well tolerated with tylenol, dilaudid, and Celebrex. Aspirin for DVT prophylaxis. WBAT on RLE. TCU for rehab.     Seen for discharge visit. Noted rash on buttocks and lower back, denies pruritis. Np headaches, dizziness, chest pain, dyspnea, bowel or bladder issues. Pain managed adequately. BP range 117-146/76-88 and sats 96% room air.     Summary of nursing facility stay:   S/P total knee arthroplasty, right  Osteoarthritis of both knees, unspecified osteoarthritis type  Postoperative pain  Encounter for deep vein thrombosis (DVT) prophylaxis  Physical deconditioning  Acute, s/p surgery. Changed tylenol to 650 mg TID and TID PRN. Continue Aspirin, Celebrex and dilaudid 2-4 mg q4h PRN. Last Hgb 12.7 on 9/11, recheck CBC on 10/9 and Hgb 10.5.  Therapies as ordered and ortho f/u as planned.     Hypertension, unspecified type  Chronic, stable. Continue lisinopril 20 mg once daily, consider increasing dose if BP remains elevated. Last Cr 0.68 on 9/11, recheck BMP on 10/9 and Cr 0.59. Monitor vs.    Nausea  Episodic. Continue ondansetron 4mg q8h PRN    Slow transit constipation  Changed senna-s to scheduled 2 tablet BID. Effective.     Insomnia, unspecified type  Chronic, stable. Continue melatonin 3mg as PTA.     Vitamin D deficiency  Continue Vitamin D supplement    Nasal mucosa dry  Continue fluticasone nasal spray     Rash  Newly noted flat red rash on buttocks and lower back,?contact dermatitis. Start Kenalog cream.    Discharge Medications:  MED REC REQUIRED  Post Medication Reconciliation Status:  Discharge medications reconciled and changed, see notes/orders    Current Outpatient Medications   Medication Sig Dispense Refill    acetaminophen (TYLENOL) 325 MG tablet Take 2 tablets (650 mg) by mouth every 4 hours as needed for other (mild pain) (Patient taking differently: Take 650 mg by mouth 3 times daily And TID PRN) 100 tablet 0    aspirin 81 MG EC tablet Take 1 tablet (81 mg) by mouth 2 times daily 60 tablet 0    celecoxib (CELEBREX) 200 MG capsule Take 1 capsule (200 mg) by mouth daily for 14 days Do not take within 6 hours of ibuprofen (MOTRIN, ADVIL) or ketorolac (TORADOL) if prescribed. 14 capsule 0    cholecalciferol (VITAMIN D3) 125 mcg (5000 units) capsule Take 125 mcg by mouth daily      EPINEPHrine (ANY BX GENERIC EQUIV) 0.3 MG/0.3ML injection 2-pack Inject 0.3 mg into the muscle as needed for anaphylaxis May repeat one time in 5-15 minutes if response to initial dose is inadequate.      FLUTICASONE PROPIONATE, NASAL, NA Spray 1 spray in nostril daily as needed      lisinopril (ZESTRIL) 20 MG tablet Take 1 tablet by mouth daily      melatonin 3 MG tablet Take 3 mg by mouth nightly as needed for sleep      Misc Natural Products (OSTEO  "BI-FLEX ADV JOINT SHIELD) TABS Take 1,500 mg by mouth daily      Probiotic Product (PROBIOTIC DAILY) CAPS Take 1 capsule by mouth daily      triamcinolone (KENALOG) 0.1 % external cream Apply topically 2 times daily      Ubiquinol 100 MG CAPS Take 100 mg by mouth daily      HYDROmorphone (DILAUDID) 2 MG tablet Take 1-2 tablets (2-4 mg) by mouth every 4 hours as needed for moderate to severe pain 1 if pain rated 1-5 & 2 if 6-10. 10 tablet 0    ondansetron (ZOFRAN) 4 MG tablet Take 1 tablet (4 mg) by mouth every 8 hours as needed for nausea 15 tablet 0        Controlled medications:   Dilaudid 2 mg tabs #10 no refills     Past Medical History:   Past Medical History:   Diagnosis Date    Degenerative arthritis of knee, bilateral     Giardia     has intermittent flares of diarrhea    H/O vein stripping 2006    Right leg    Hx of prior ablation treatment 2008    Lap ablation for uterine fibroids    Hypertension     Infection due to 2019 novel coronavirus     Knee mass, right     Pap smear for cervical cancer screening     S/P LEEP     S/P right knee arthroscopy     S/P tubal ligation 1988     Physical Exam:   Vitals: BP (!) 146/88   Pulse 77   Temp 98.2  F (36.8  C)   Resp 18   Ht 1.626 m (5' 4\")   Wt 69.9 kg (154 lb)   SpO2 98%   BMI 26.43 kg/m    BMI: Body mass index is 26.43 kg/m .  GENERAL APPEARANCE:  Alert, in no distress, pleasant, cooperative, oriented x 4  EYES:  EOM, lids, pupils and irises normal, sclera clear and conjunctiva normal, no discharge or mattering on lids or lashes noted  ENT:  Mouth normal, moist mucous membranes, nose normal without drainage or crusting, external ears without lesions, hearing acuity intact  RESP:  respiratory effort normal, no chest wall tenderness, no respiratory distress, Lung sounds clear, patient is on room air  CV:  Auscultation of heart done, rate and rhythm controlled and regular, no murmur, no rub or gallop. Edema trace right LE  ABDOMEN: hypoactive bowel sounds, " soft, nontender, no palpable masses.  M/S:   Gait and station walks with assist , no tenderness or swelling of the joints; able to move all extremities, digits normal  SKIN:  Inspection and palpation of skin and subcutaneous tissue: skin on extremities warm, dry and intact. Flat red scattered rash on buttocks and lower back  NEURO: cranial nerves 2-12 grossly intact, no facial asymmetry, no speech deficits and able to follow directions, moves all extremities symmetrically  PSYCH:  insight and judgement and memory intact, affect and mood normal     SNF labs: Most Recent 3 CBC's:  Recent Labs   Lab Test 10/09/23  0646 10/05/23  0637 10/04/23  0631   WBC 7.9  --   --    HGB 10.5* 9.8* 10.1*   MCV 95  --   --      --   --      Most Recent 3 BMP's:  Recent Labs   Lab Test 10/09/23  0646 10/05/23  0619 10/04/23  0527 10/03/23  1106     --   --   --    POTASSIUM 4.5  --   --  4.2   CHLORIDE 104  --   --   --    CO2 25  --   --   --    BUN 12.4  --   --   --    CR 0.59  --   --   --    ANIONGAP 11  --   --   --    JARRETT 10.9*  --   --   --    GLC 93 117* 105* 101*       DISCHARGE PLAN:  Follow up labs: No labs orders/due  Medical Follow Up:      Follow up with primary care provider in 2-3 weeks  Follow up with specialist ortho as planned   Current [place] scheduled appointments:     Future Appointments   Date Time Provider Department Center   10/19/2023  1:10 PM Zara Hinton PT [facility]   10/23/2023 12:20 PM Camille Gallegos PT [facility]   10/26/2023 12:00 PM Zara Hinton PT [facility]      Discharge Services: Out Patient:  physical therapy and occupational therapy  Discharge Instructions Verbalized to Patient at Discharge:   DO NOT DRIVE while taking narcotic pain medications.     TOTAL DISCHARGE TIME:   Greater than 30 minutes  Electronically signed by:  HA Mares CNP

## 2023-10-16 ENCOUNTER — THERAPY VISIT (OUTPATIENT)
Dept: PHYSICAL THERAPY | Facility: CLINIC | Age: 68
End: 2023-10-16
Payer: COMMERCIAL

## 2023-10-16 DIAGNOSIS — R26.9 ABNORMAL GAIT: ICD-10-CM

## 2023-10-16 DIAGNOSIS — Z96.651 STATUS POST TOTAL RIGHT KNEE REPLACEMENT: Primary | ICD-10-CM

## 2023-10-16 PROCEDURE — 97112 NEUROMUSCULAR REEDUCATION: CPT | Mod: GP | Performed by: PHYSICAL THERAPIST

## 2023-10-16 PROCEDURE — 97161 PT EVAL LOW COMPLEX 20 MIN: CPT | Mod: GP | Performed by: PHYSICAL THERAPIST

## 2023-10-16 PROCEDURE — 97110 THERAPEUTIC EXERCISES: CPT | Mod: GP | Performed by: PHYSICAL THERAPIST

## 2023-10-16 ASSESSMENT — ACTIVITIES OF DAILY LIVING (ADL)
SWELLING: THE SYMPTOM AFFECTS MY ACTIVITY MODERATELY
KNEE_ACTIVITY_OF_DAILY_LIVING_SCORE: 40
HOW_WOULD_YOU_RATE_THE_CURRENT_FUNCTION_OF_YOUR_KNEE_DURING_YOUR_USUAL_DAILY_ACTIVITIES_ON_A_SCALE_FROM_0_TO_100_WITH_100_BEING_YOUR_LEVEL_OF_KNEE_FUNCTION_PRIOR_TO_YOUR_INJURY_AND_0_BEING_THE_INABILITY_TO_PERFORM_ANY_OF_YOUR_USUAL_DAILY_ACTIVITIES?: 30
AS_A_RESULT_OF_YOUR_KNEE_INJURY,_HOW_WOULD_YOU_RATE_YOUR_CURRENT_LEVEL_OF_DAILY_ACTIVITY?: ABNORMAL
KNEEL ON THE FRONT OF YOUR KNEE: I AM UNABLE TO DO THE ACTIVITY
GO DOWN STAIRS: ACTIVITY IS SOMEWHAT DIFFICULT
LIMPING: THE SYMPTOM AFFECTS MY ACTIVITY MODERATELY
PAIN: THE SYMPTOM AFFECTS MY ACTIVITY MODERATELY
GIVING WAY, BUCKLING OR SHIFTING OF KNEE: I HAVE THE SYMPTOM BUT IT DOES NOT AFFECT MY ACTIVITY
WALK: ACTIVITY IS FAIRLY DIFFICULT
WEAKNESS: THE SYMPTOM AFFECTS MY ACTIVITY MODERATELY
RAW_SCORE: 28
STIFFNESS: THE SYMPTOM AFFECTS MY ACTIVITY MODERATELY
SIT WITH YOUR KNEE BENT: ACTIVITY IS VERY DIFFICULT
STAND: ACTIVITY IS FAIRLY DIFFICULT
GO UP STAIRS: ACTIVITY IS SOMEWHAT DIFFICULT
SQUAT: I AM UNABLE TO DO THE ACTIVITY
KNEE_ACTIVITY_OF_DAILY_LIVING_SUM: 28
RISE FROM A CHAIR: ACTIVITY IS SOMEWHAT DIFFICULT
HOW_WOULD_YOU_RATE_THE_OVERALL_FUNCTION_OF_YOUR_KNEE_DURING_YOUR_USUAL_DAILY_ACTIVITIES?: ABNORMAL

## 2023-10-16 NOTE — PROGRESS NOTES
PHYSICAL THERAPY EVALUATION  Type of Visit: Evaluation  Patient had a R TKA on 10/03/2023.  She was in the hospital for 2 days and then went to a rehab facility for 1 week and had PT and OT there.  She has been using a cane for ambulation for the past 3 days which is going well.  She lives alone in a 2-story home with railings inside.    See electronic medical record for Abuse and Falls Screening details.    Subjective       Presenting condition or subjective complaint: R TKA  Date of onset: 10/03/23    Relevant medical history: Arthritis; Dizziness; High blood pressure; Implanted device   Past Medical History:   Diagnosis Date    Degenerative arthritis of knee, bilateral     Giardia     has intermittent flares of diarrhea    H/O vein stripping 2006    Right leg    Hx of prior ablation treatment 2008    Lap ablation for uterine fibroids    Hypertension     Infection due to 2019 novel coronavirus     Knee mass, right     Pap smear for cervical cancer screening     S/P LEEP     S/P right knee arthroscopy     S/P tubal ligation 1988       Dates & types of surgery:    Past Surgical History:   Procedure Laterality Date    ARTHROPLASTY KNEE Right 10/3/2023    Procedure: RIGHT TOTAL KNEE ARTHROPLASTY;  Surgeon: Darshan Caban MD;  Location:  OR     Current Outpatient Medications   Medication    acetaminophen (TYLENOL) 325 MG tablet    aspirin 81 MG EC tablet    celecoxib (CELEBREX) 200 MG capsule    cholecalciferol (VITAMIN D3) 125 mcg (5000 units) capsule    EPINEPHrine (ANY BX GENERIC EQUIV) 0.3 MG/0.3ML injection 2-pack    FLUTICASONE PROPIONATE, NASAL, NA    HYDROmorphone (DILAUDID) 2 MG tablet    lisinopril (ZESTRIL) 20 MG tablet    melatonin 3 MG tablet    Misc Natural Products (OSTEO BI-FLEX ADV JOINT SHIELD) TABS    ondansetron (ZOFRAN) 4 MG tablet    Probiotic Product (PROBIOTIC DAILY) CAPS    triamcinolone (KENALOG) 0.1 % external cream    Ubiquinol 100 MG CAPS     No current facility-administered  medications for this visit.         Prior diagnostic imaging/testing results: X-ray   x-ray R knee--arthritis  Prior therapy history for the same diagnosis, illness or injury: Yes PT spring 2023    Prior Level of Function  Transfers: Independent  Ambulation: Independent  ADL: Independent  IADL:     Living Environment  Social support: Alone   Type of home: House; 2-story   Stairs to enter the home: Yes 8 Is there a railing: No   Ramp: No   Stairs inside the home: Yes 13 Is there a railing: Yes   Help at home: Self Cares (home health aide/personal care attendant, family, etc)  Equipment owned: Straight Cane; Walker; Raised toilet seat; Bath bench     Employment: Not Applicable gardening, pickleball, swimming  Hobbies/Interests:      Patient goals for therapy: walk, drive, strength    Pain assessment: Location: R knee/Ratin-8/10     Objective   KNEE EVALUATION  PAIN: Pain Level at Rest: 2/10  Pain Level with Use: 8/10  Pain Location: knee  Pain Quality: Aching  Pain Frequency: constant  Pain is Worst: nighttime  Pain is Exacerbated By: bending the R knee, straightening the R knee, getting up from sitting, getting in/out of bed, walking--uses cane and limited to 1 block, stairs--doing non-reciprocally,sleep interruptions due to difficulty getting comfortable  Pain is Relieved By: cold and rest  Pain Progression: Improved  INTEGUMENTARY (edema, incisions):  moderate edema in R knee--normal for post-op status; incision covered by bandage, no redness around; residual bruising noted into R calf mostly laterally  POSTURE:   GAIT:  Weightbearing Status: WBAT  Assistive Device(s): Cane (single end)  Gait Deviations: decreased R stance time, decreased R knee extension at heelstrike through midstance, decreased R knee flexion with swing, decreased silvestre  BALANCE/PROPRIOCEPTION:   WEIGHTBEARING ALIGNMENT:   NON-WEIGHTBEARING ALIGNMENT:   ROM:   (Degrees) Left AROM Left PROM  Right AROM Right PROM   Knee Flexion   78  degrees, PDM, ERP 80 degrees, PDM, ERP   Knee Extension   60 degrees, PDM, ERP 10 degrees, ERP   Pain:   End feel:     STRENGTH: quad set R poor  FLEXIBILITY:   SPECIAL TESTS:   FUNCTIONAL TESTS:   PALPATION:   JOINT MOBILITY:     Assessment & Plan   CLINICAL IMPRESSIONS  Medical Diagnosis: post knee replacement    Treatment Diagnosis: post knee replacement R, abnormal gait   Impression/Assessment: Patient is a 68 year old female with R knee complaints s/p TKA on 10/03/0223.  The following significant findings have been identified: Pain, Decreased ROM/flexibility, Decreased joint mobility, Decreased strength, Inflammation, Edema, Impaired gait, Impaired muscle performance, and Decreased activity tolerance. These impairments interfere with their ability to perform self care tasks, recreational activities, household chores, driving , household mobility, and community mobility as compared to previous level of function.     Clinical Decision Making (Complexity):  Clinical Presentation: Stable/Uncomplicated  Clinical Presentation Rationale: based on medical and personal factors listed in PT evaluation  Clinical Decision Making (Complexity): Low complexity    PLAN OF CARE  Treatment Interventions:  Interventions: Gait Training, Manual Therapy, Neuromuscular Re-education, Therapeutic Activity, Therapeutic Exercise, Self-Care/Home Management    Long Term Goals     PT Goal 1  Goal Identifier: ambulation  Goal Description: Patient will be able to ambulate 8 blocks without assistive device  Rationale: to maximize safety and independence within the community  Goal Progress: currently 1 block with SEC.  Target Date: 12/25/23      Frequency of Treatment: 2x/week for 4 weeks tapering to 1x/week for 6 weeks  Duration of Treatment: 10 weeks total    Recommended Referrals to Other Professionals: none  Education Assessment:   Learner/Method: Patient;Listening;Demonstration;Pictures/Video;No Barriers to Learning    Risks and benefits  of evaluation/treatment have been explained.   Patient/Family/caregiver agrees with Plan of Care.     Evaluation Time:     PT Eval, Low Complexity Minutes (59633): 12       Signing Clinician: ANABELLE Ceja [facility]                                                                                   OUTPATIENT PHYSICAL THERAPY      PLAN OF TREATMENT FOR OUTPATIENT REHABILITATION   Patient's Last Name, First Name, Nila Lizama YOB: 1955   Provider's Name   [facility]   Medical Record No.  [id number]     Onset Date: 10/03/23  Start of Care Date: 10/16/23     Medical Diagnosis:  post knee replacement      PT Treatment Diagnosis:  post knee replacement R, abnormal gait Plan of Treatment  Frequency/Duration: 2x/week for 4 weeks tapering to 1x/week for 6 weeks/ 10 weeks total    Certification date from 10/16/23 to 12/25/23         See note for plan of treatment details and functional goals     Camille Gallegos PT                         I CERTIFY THE NEED FOR THESE SERVICES FURNISHED UNDER        THIS PLAN OF TREATMENT AND WHILE UNDER MY CARE .             Physician Signature               Date    X_____________________________________________________                    Referring Provider:  Darshan Caban      Initial Assessment  See Epic Evaluation- Start of Care Date: 10/16/23

## 2023-10-19 ENCOUNTER — THERAPY VISIT (OUTPATIENT)
Dept: PHYSICAL THERAPY | Facility: CLINIC | Age: 68
End: 2023-10-19
Payer: COMMERCIAL

## 2023-10-19 DIAGNOSIS — Z96.651 STATUS POST TOTAL RIGHT KNEE REPLACEMENT: Primary | ICD-10-CM

## 2023-10-19 DIAGNOSIS — R26.9 ABNORMAL GAIT: ICD-10-CM

## 2023-10-19 PROCEDURE — 97110 THERAPEUTIC EXERCISES: CPT | Mod: GP | Performed by: PHYSICAL THERAPIST

## 2023-10-19 PROCEDURE — 97010 HOT OR COLD PACKS THERAPY: CPT | Mod: GP | Performed by: PHYSICAL THERAPIST

## 2023-10-22 ENCOUNTER — HEALTH MAINTENANCE LETTER (OUTPATIENT)
Age: 68
End: 2023-10-22

## 2023-10-23 ENCOUNTER — THERAPY VISIT (OUTPATIENT)
Dept: PHYSICAL THERAPY | Facility: CLINIC | Age: 68
End: 2023-10-23
Payer: COMMERCIAL

## 2023-10-23 DIAGNOSIS — R26.9 ABNORMAL GAIT: ICD-10-CM

## 2023-10-23 DIAGNOSIS — Z96.651 STATUS POST TOTAL RIGHT KNEE REPLACEMENT: Primary | ICD-10-CM

## 2023-10-23 PROCEDURE — 97110 THERAPEUTIC EXERCISES: CPT | Mod: GP | Performed by: PHYSICAL THERAPIST

## 2023-10-26 ENCOUNTER — THERAPY VISIT (OUTPATIENT)
Dept: PHYSICAL THERAPY | Facility: CLINIC | Age: 68
End: 2023-10-26
Payer: COMMERCIAL

## 2023-10-26 DIAGNOSIS — Z96.651 STATUS POST TOTAL RIGHT KNEE REPLACEMENT: Primary | ICD-10-CM

## 2023-10-26 DIAGNOSIS — R26.9 ABNORMAL GAIT: ICD-10-CM

## 2023-10-26 PROCEDURE — 97112 NEUROMUSCULAR REEDUCATION: CPT | Mod: GP | Performed by: PHYSICAL THERAPIST

## 2023-10-26 PROCEDURE — 97110 THERAPEUTIC EXERCISES: CPT | Mod: GP | Performed by: PHYSICAL THERAPIST

## 2023-10-31 ENCOUNTER — THERAPY VISIT (OUTPATIENT)
Dept: PHYSICAL THERAPY | Facility: CLINIC | Age: 68
End: 2023-10-31
Payer: COMMERCIAL

## 2023-10-31 DIAGNOSIS — Z96.651 STATUS POST TOTAL RIGHT KNEE REPLACEMENT: Primary | ICD-10-CM

## 2023-10-31 DIAGNOSIS — R26.9 ABNORMAL GAIT: ICD-10-CM

## 2023-10-31 PROCEDURE — 97110 THERAPEUTIC EXERCISES: CPT | Mod: GP | Performed by: PHYSICAL THERAPIST

## 2023-11-03 ENCOUNTER — THERAPY VISIT (OUTPATIENT)
Dept: PHYSICAL THERAPY | Facility: CLINIC | Age: 68
End: 2023-11-03
Payer: COMMERCIAL

## 2023-11-03 DIAGNOSIS — R26.9 ABNORMAL GAIT: ICD-10-CM

## 2023-11-03 DIAGNOSIS — Z96.651 STATUS POST TOTAL RIGHT KNEE REPLACEMENT: Primary | ICD-10-CM

## 2023-11-03 PROCEDURE — 97110 THERAPEUTIC EXERCISES: CPT | Mod: GP | Performed by: PHYSICAL THERAPIST

## 2023-11-07 ENCOUNTER — THERAPY VISIT (OUTPATIENT)
Dept: PHYSICAL THERAPY | Facility: CLINIC | Age: 68
End: 2023-11-07
Payer: COMMERCIAL

## 2023-11-07 DIAGNOSIS — Z96.651 STATUS POST TOTAL RIGHT KNEE REPLACEMENT: Primary | ICD-10-CM

## 2023-11-07 DIAGNOSIS — R26.9 ABNORMAL GAIT: ICD-10-CM

## 2023-11-07 PROCEDURE — 97110 THERAPEUTIC EXERCISES: CPT | Mod: GP | Performed by: PHYSICAL THERAPIST

## 2023-11-08 ENCOUNTER — HOSPITAL ENCOUNTER (OUTPATIENT)
Dept: CT IMAGING | Facility: CLINIC | Age: 68
Discharge: HOME | End: 2023-11-08
Attending: INTERNAL MEDICINE | Admitting: INTERNAL MEDICINE
Payer: COMMERCIAL

## 2023-11-08 DIAGNOSIS — E21.3 HYPERPARATHYROIDISM (H): ICD-10-CM

## 2023-11-08 PROCEDURE — 250N000009 HC RX 250: Performed by: INTERNAL MEDICINE

## 2023-11-08 PROCEDURE — 250N000011 HC RX IP 250 OP 636: Performed by: INTERNAL MEDICINE

## 2023-11-08 PROCEDURE — 70492 CT SFT TSUE NCK W/O & W/DYE: CPT

## 2023-11-08 RX ORDER — IOPAMIDOL 755 MG/ML
67 INJECTION, SOLUTION INTRAVASCULAR ONCE
Status: COMPLETED | OUTPATIENT
Start: 2023-11-08 | End: 2023-11-08

## 2023-11-08 RX ADMIN — IOPAMIDOL 67 ML: 755 INJECTION, SOLUTION INTRAVENOUS at 16:18

## 2023-11-08 RX ADMIN — SODIUM CHLORIDE 80 ML: 9 INJECTION, SOLUTION INTRAVENOUS at 16:18

## 2023-11-10 ENCOUNTER — THERAPY VISIT (OUTPATIENT)
Dept: PHYSICAL THERAPY | Facility: CLINIC | Age: 68
End: 2023-11-10
Payer: COMMERCIAL

## 2023-11-10 DIAGNOSIS — Z96.651 STATUS POST TOTAL RIGHT KNEE REPLACEMENT: Primary | ICD-10-CM

## 2023-11-10 DIAGNOSIS — R26.9 ABNORMAL GAIT: ICD-10-CM

## 2023-11-10 PROCEDURE — 97530 THERAPEUTIC ACTIVITIES: CPT | Mod: GP | Performed by: PHYSICAL THERAPIST

## 2023-11-10 PROCEDURE — 97110 THERAPEUTIC EXERCISES: CPT | Mod: GP | Performed by: PHYSICAL THERAPIST

## 2023-11-14 ENCOUNTER — THERAPY VISIT (OUTPATIENT)
Dept: PHYSICAL THERAPY | Facility: CLINIC | Age: 68
End: 2023-11-14
Payer: COMMERCIAL

## 2023-11-14 DIAGNOSIS — Z96.651 STATUS POST TOTAL RIGHT KNEE REPLACEMENT: Primary | ICD-10-CM

## 2023-11-14 DIAGNOSIS — R26.9 ABNORMAL GAIT: ICD-10-CM

## 2023-11-14 PROCEDURE — 97110 THERAPEUTIC EXERCISES: CPT | Mod: GP | Performed by: PHYSICAL THERAPIST

## 2023-11-14 NOTE — PROGRESS NOTES
Patient has been seen for 9 visits with treatment focusing on ROM and strengthening exercises for the R knee.  She has made good progress especially over the past 2 weeks with significant improvements with ROM and overall mobility and function.  She continues to have ROM limitations which affect her gait and would benefit from continued treatment to normalize ROM and improve strength.      PLAN  Continue therapy per current plan of care.    Beginning/End Dates of Progress Note Reporting Period:  10/16/23 to 11/14/2023    Referring Provider:  Darshan Caban     11/14/23 0500   Appointment Info   Signing clinician's name / credentials Camille Gallegos, PT   Total/Authorized Visits 14 (PT estimate)   Visits Used 9   Medical Diagnosis post knee replacement   PT Tx Diagnosis post knee replacement R, abnormal gait   Quick Adds Certification   Progress Note/Certification   Start of Care Date 10/16/23   Onset of illness/injury or Date of Surgery 10/03/23   Therapy Frequency continue 2x/week   Predicted Duration 10 weeks total   Certification date from 10/16/23   Certification date to 12/25/23   Progress Note Due Date 11/14/23   Progress Note Completed Date 10/16/23   PT Goal 1   Goal Identifier ambulation   Goal Description Patient will be able to ambulate 8 blocks without assistive device   Rationale to maximize safety and independence within the community   Goal Progress Still using cane but can be on her feet for at least 30 minutes without issue so making good progress.  Continue.   Target Date 12/25/23   Subjective Report   Subjective Report Patient reports her R knee continues to improve.  She was able to be out in her yard for 2 hours or so over the weekend picking up sticks and generally cleaning up which felt good--took breaks every 1/2 hour or so which helped.   Objective Measures   Objective Measures Objective Measure 1;Objective Measure 2   Objective Measure 1   Objective Measure R knee PROM:   Details extension =4  "degrees   Objective Measure 2   Objective Measure gait   Details Improving pattern--continued deviation due to decreased R knee extension at heelstrike through midstance.   Treatment Interventions (PT)   Interventions Therapeutic Procedure/Exercise;Neuromuscular Re-education;Therapeutic Activity   Therapeutic Procedure/Exercise   Therapeutic Procedures: strength, endurance, ROM, flexibillity minutes (02714) 41   Therapeutic Procedures Ther Proc 2;Ther Proc 3;Ther Proc 4;Ther Proc 5;Ther Proc 6;Ther Proc 7;Ther Proc 8;Ther Proc 9;Ther Proc 10   Ther Proc 1 functional  knee extension   Ther Proc 1 - Details green TB 3\"x20 reps R--VC, VSC for weight on R LE, not using hip   Ther Proc 2 supine extension stretch   Ther Proc 2 - Details x6' w/intermittent PT OP--4 degrees ext after   Ther Proc 3 rep R knee ext w/self-OP   Ther Proc 3 - Details standing w/quad set x15 reps, 2 sets   Ther Proc 4 NuStep   Ther Proc 4 - Details not today--did bike instead   Ther Proc 5 standing hip flexor stretch   Ther Proc 5 - Details HEP   Ther Proc 6 seated PROM flexion   Ther Proc 6 - Details x12' again w/distraction and oscillations   Ther Proc 7 standing knee flexion stretch   Ther Proc 7 - Details on 12\" step--HEP   Ther Proc 8 stationary bike   Ther Proc 8 - Details SH 3, x12 '   Ther Proc 9 wall slide   Ther Proc 9 - Details no time   Skilled Intervention VC, VSC for form   Patient Response/Progress pain with stretching but improved ROM with more stretching   Ther Proc 10 functional knee extension   Ther Proc 10 - Details green TB 3\"x20 reps R--VC, VSC for full extension, quad use   Therapeutic Activity   Therapeutic Activities: dynamic activities to improve functional performance minutes (82732) 4   Therapeutic Activities Ther Act 2   Ther Act 1 treadmill   Ther Act 1 - Details no time today   Ther Act 2 lateral step-down   Ther Act 2 - Details 4\" x10 reps, 2 sets--VC, VSC for full extension at top   Skilled Intervention VC, VSC " for full extension with step   Patient Response/Progress improved pattern with cues   Education   Learner/Method Patient;Listening;Demonstration;No Barriers to Learning   Plan   Plan for next session continue current treatment as patient is progressing   Total Session Time   Timed Code Treatment Minutes 45   Total Treatment Time (sum of timed and untimed services) 45

## 2023-11-17 ENCOUNTER — THERAPY VISIT (OUTPATIENT)
Dept: PHYSICAL THERAPY | Facility: CLINIC | Age: 68
End: 2023-11-17
Payer: COMMERCIAL

## 2023-11-17 DIAGNOSIS — Z96.651 STATUS POST TOTAL RIGHT KNEE REPLACEMENT: Primary | ICD-10-CM

## 2023-11-17 DIAGNOSIS — R26.9 ABNORMAL GAIT: ICD-10-CM

## 2023-11-17 PROCEDURE — 97530 THERAPEUTIC ACTIVITIES: CPT | Mod: GP | Performed by: PHYSICAL THERAPIST

## 2023-11-17 PROCEDURE — 97110 THERAPEUTIC EXERCISES: CPT | Mod: 59 | Performed by: PHYSICAL THERAPIST

## 2023-11-21 ENCOUNTER — THERAPY VISIT (OUTPATIENT)
Dept: PHYSICAL THERAPY | Facility: CLINIC | Age: 68
End: 2023-11-21
Payer: COMMERCIAL

## 2023-11-21 DIAGNOSIS — R26.9 ABNORMAL GAIT: ICD-10-CM

## 2023-11-21 DIAGNOSIS — Z96.651 STATUS POST TOTAL RIGHT KNEE REPLACEMENT: Primary | ICD-10-CM

## 2023-11-21 PROCEDURE — 97530 THERAPEUTIC ACTIVITIES: CPT | Mod: GP | Performed by: PHYSICAL THERAPIST

## 2023-11-21 PROCEDURE — 97110 THERAPEUTIC EXERCISES: CPT | Mod: 59 | Performed by: PHYSICAL THERAPIST

## 2023-11-28 ENCOUNTER — THERAPY VISIT (OUTPATIENT)
Dept: PHYSICAL THERAPY | Facility: CLINIC | Age: 68
End: 2023-11-28
Payer: COMMERCIAL

## 2023-11-28 DIAGNOSIS — R26.9 ABNORMAL GAIT: ICD-10-CM

## 2023-11-28 DIAGNOSIS — Z96.651 STATUS POST TOTAL RIGHT KNEE REPLACEMENT: Primary | ICD-10-CM

## 2023-11-28 PROCEDURE — 97530 THERAPEUTIC ACTIVITIES: CPT | Mod: GP | Performed by: PHYSICAL THERAPIST

## 2023-11-28 PROCEDURE — 97110 THERAPEUTIC EXERCISES: CPT | Mod: 59 | Performed by: PHYSICAL THERAPIST

## 2023-12-01 ENCOUNTER — THERAPY VISIT (OUTPATIENT)
Dept: PHYSICAL THERAPY | Facility: CLINIC | Age: 68
End: 2023-12-01
Payer: COMMERCIAL

## 2023-12-01 DIAGNOSIS — Z96.651 STATUS POST TOTAL RIGHT KNEE REPLACEMENT: Primary | ICD-10-CM

## 2023-12-01 DIAGNOSIS — R26.9 ABNORMAL GAIT: ICD-10-CM

## 2023-12-01 PROCEDURE — 97110 THERAPEUTIC EXERCISES: CPT | Mod: 59 | Performed by: PHYSICAL THERAPIST

## 2023-12-01 PROCEDURE — 97530 THERAPEUTIC ACTIVITIES: CPT | Mod: GP | Performed by: PHYSICAL THERAPIST

## 2023-12-12 ENCOUNTER — TELEPHONE (OUTPATIENT)
Dept: SURGERY | Facility: CLINIC | Age: 68
End: 2023-12-12
Payer: COMMERCIAL

## 2023-12-12 ENCOUNTER — TRANSFERRED RECORDS (OUTPATIENT)
Dept: SURGERY | Facility: CLINIC | Age: 68
End: 2023-12-12
Payer: COMMERCIAL

## 2023-12-12 NOTE — TELEPHONE ENCOUNTER
Referral received from Shania Moon for parathyroid.    Attempt #1:    Called patient at [phone] (home)  .  No answer - left message for patient to return call to clinic [phone].    Kaila    Attempt #2:    Called pt and left a message to schedule a consult with [facility] for parathyroid, mms

## 2023-12-19 ENCOUNTER — THERAPY VISIT (OUTPATIENT)
Dept: PHYSICAL THERAPY | Facility: CLINIC | Age: 68
End: 2023-12-19
Payer: COMMERCIAL

## 2023-12-19 DIAGNOSIS — R26.9 ABNORMAL GAIT: ICD-10-CM

## 2023-12-19 DIAGNOSIS — Z96.651 STATUS POST TOTAL RIGHT KNEE REPLACEMENT: Primary | ICD-10-CM

## 2023-12-19 PROCEDURE — 97110 THERAPEUTIC EXERCISES: CPT | Mod: 59 | Performed by: PHYSICAL THERAPIST

## 2023-12-19 PROCEDURE — 97530 THERAPEUTIC ACTIVITIES: CPT | Mod: GP | Performed by: PHYSICAL THERAPIST

## 2023-12-22 ENCOUNTER — TELEPHONE (OUTPATIENT)
Dept: SURGERY | Facility: CLINIC | Age: 68
End: 2023-12-22

## 2023-12-22 ENCOUNTER — OFFICE VISIT (OUTPATIENT)
Dept: SURGERY | Facility: CLINIC | Age: 68
End: 2023-12-22
Payer: COMMERCIAL

## 2023-12-22 VITALS
SYSTOLIC BLOOD PRESSURE: 132 MMHG | HEIGHT: 64 IN | BODY MASS INDEX: 25.61 KG/M2 | OXYGEN SATURATION: 96 % | HEART RATE: 86 BPM | WEIGHT: 150 LBS | DIASTOLIC BLOOD PRESSURE: 74 MMHG

## 2023-12-22 DIAGNOSIS — E21.3 HYPERPARATHYROIDISM (H): Primary | ICD-10-CM

## 2023-12-22 PROCEDURE — 99204 OFFICE O/P NEW MOD 45 MIN: CPT | Performed by: SURGERY

## 2023-12-22 NOTE — LETTER
SESTAMIBI CT SCAN w/ SPECT DUAL ISOTOPE    Date: 12/27/2023  Time: 9:00 AM   Location: [place] [address] [place]  [address]        Please check in at the [email or web address] lobby at 8:45 AM

## 2023-12-22 NOTE — LETTER
2023       Nila Viveros  [address]  [place] [address]     RE: [id number]  : 1955    Nila Viveros has been scheduled for surgery on 2024 at 1:15 PM  at [facility] with Dr Ally Briggs.  The hospital is located at [address] in [place].    Please check in at the Surgery reception desk at 11:15 AM . This is located in the back of the hospital on the East side, just past the Emergency Room entrance.     DO NOT EAT OR DRINK ANYTHING 8 HOURS BEFORE YOUR ARRIVAL TIME.   You may have sips of clear liquids up until 2 hours before your arrival time. If you have been advised to take your medication, please do this early in the morning with just sips of clear liquid.     Hospital regulations require an updated pre-operative examination to be completed within 30 days of the procedure. This can be done by your primary care provider. Please ask them to fax documentation to [phone]. We also recommend you bring a copy with you.     You should shower before your surgery with Hibiclens or Exidine soap.  This can be found at your local pharmacy or you can pick it up from our office for free.  Please call our office if you have any questions.     You will be required to have an Adult (friend or family member) drive you home after your surgery and arrange for an adult to stay with you until the next morning.     You will receive several calls from our staff 3-7 days prior to your scheduled procedure with further details and to answer any questions you may have.    It is sometimes necessary to adjust the surgery schedule due to emergencies and additions to the schedule.  If your surgery is affected by this, we greatly appreciate your flexibility and understanding in this matter    It is best if you call regarding post-operative questions between the hours of 8:00 am & 3:00 pm Monday-Friday, so you have access to the daytime care team that know you  best.  Prescription refills are accepted during regular office hours only.        Please do not bring any Disability or FMLA papers to the hospital.  They need to be either faxed ([phone]), mailed or hand delivered to our office by you or a family member for completion.  Please allow 14 business days to complete paperwork.        If you have questions or concerns, please contact our office at [phone].

## 2023-12-22 NOTE — TELEPHONE ENCOUNTER
Type of surgery: PARATHYROIDECTOMY POSSIBLE CERVICAL EXPLORATION   Location of surgery: [facility]  Date and time of surgery: 2/7/2023 @ 1:15 PM   Surgeon: Ally Briggs MD   Pre-Op Appt Date: PATIENT TO SCHEDULE    Post-Op Appt Date: PATIENT TO SCHEDULE      Packet sent out: YES  Pre-cert/Authorization completed:  Not Applicable  Date: 12/22/2023         PARATHYROIDECTOMY POSSIBLE CERVICAL EXPLORATION GENERAL PT INST TO HAVE H&P WITH  MIN REQ PA ASSIST [facility] NMS

## 2023-12-23 NOTE — PROGRESS NOTES
History of Present Illness:  Nila Viveros is a 68 year old female who is sent by Dr. Shania Moon for evaluation of hypercalcemia/hyperparathyroidism. She had an elevated calcium found on screening bloodwork. She has a calcium level as high as 11.1 (9/11/23).   She has had the elevated calcium for at least 15 months.  She has no constitutional symptoms.  She has no history of kidney stones.   She has not had bony fractures. A DEXA scan shows osteoporosis with her lowest T-score -2.7.     There is no family history of parathyroid disease.  Nila has no prior neck surgery.  She is not on thyroid medications.      Past Medical History:   Diagnosis Date    Degenerative arthritis of knee, bilateral     Giardia     has intermittent flares of diarrhea    H/O vein stripping 2006    Right leg    Hx of prior ablation treatment 2008    Lap ablation for uterine fibroids    Hypertension     Infection due to 2019 novel coronavirus     Knee mass, right     Pap smear for cervical cancer screening     S/P LEEP     S/P right knee arthroscopy     S/P tubal ligation 1988     Past Surgical History:   Procedure Laterality Date    ARTHROPLASTY KNEE Right 10/3/2023    Procedure: RIGHT TOTAL KNEE ARTHROPLASTY;  Surgeon: Darshan Caban MD;  Location:  OR     Allergies   Allergen Reactions    Azithromycin Hives    Bee Venom Hives    Zinc Nausea    Nickel Rash    Silver Rash     Social History     Socioeconomic History    Marital status: Single     Spouse name: Not on file    Number of children: Not on file    Years of education: Not on file    Highest education level: Not on file   Occupational History    Not on file   Tobacco Use    Smoking status: Never     Passive exposure: Never    Smokeless tobacco: Never   Substance and Sexual Activity    Alcohol use: Not Currently    Drug use: Never    Sexual activity: Not on file   Other Topics Concern    Not on file   Social History Narrative    Not on file     Social Determinants of  "Health     Financial Resource Strain: Not on file   Food Insecurity: Not on file   Transportation Needs: Not on file   Physical Activity: Not on file   Stress: Not on file   Social Connections: Not on file   Interpersonal Safety: Not on file   Housing Stability: Not on file       Review of Systems:  10 point ROS is negative except as stated in the History of the Present Illness.    Physical Exam:  /74   Pulse 86   Ht 1.626 m (5' 4\")   Wt 68 kg (150 lb)   SpO2 96%   BMI 25.75 kg/m    Well developed, well nourished female in no apparent distress.  HEENT:  Normocephalic, atraumatic.  Neck:   Normal appearance.              Range of motion is normal.              No neck masses.  Thyroid:  Palpably normal.  Lymph:  No cervical adenopathy.  Respirations:  Unlabored.  Neurologic:  Alert.  Speech is clear.  Moves all extremities with good strength.  Skin:  Warm, dry and no rash.  Psychologic:  Alert, appropriate range of emotions.    Labs:  Calcium -10.7 (12/6/23)  PTH - 78.4 (12/6/23)    Imaging:  All imaging personally reviewed with Nila Ruffin Scan with SPECT-CT: Pending  4D CT Scan:  EXAM: CT PARATHYROID WO and W CONTRAST  LOCATION: [facility]  DATE: 11/8/2023     INDICATION: 4D CT  COMPARISON: None.  CONTRAST: 67mL isovue 370  TECHNIQUE: CT Soft Tissues Neck without and with IV contrast including multiphasic postcontrast imaging according to the 4D parathyroid protocol. Multiplanar reformats. Dose reduction techniques were used.     FINDINGS:      PARATHYROID: Small ovoid nodule dorsal left inferior thyroid gland measures 7 x 2 mm and is hypoechoic attenuating relative to the thyroid gland on CT, exhibits arterial hyperenhancement relative to the thyroid gland, and slight washout.     Small ovoid nodule caudal to the left thyroid gland at the left thoracic inlet measures 5 x 2 mm exhibits hypoattenuation on the noncontrast CT, hyperenhancement and apparent washout on the " venous phase.     Small nodular inferomedial and caudal to the right thyroid gland (series 6, images 46 and 49, respectively), both measuring approximately 3 mm, do not exhibit arterial hyperenhancement or washout and are favored reflect lymph nodes.     MUCOSAL SPACES/SOFT TISSUES: Normal mucosal spaces of the upper aerodigestive tract. No mucosal mass or inflammation identified. Normal vocal cords and infraglottic trachea. Normal parapharyngeal space and subcutaneous soft tissues. Normal oral cavity,    spaces, and floor of mouth structures.     LYMPH NODES: No pathologic lymph nodes by size or morphology criteria.      SALIVARY GLANDS: Normal parotid and submandibular glands.     THYROID: Small hypoenhancing nodule in the right thyroid gland measures 5 mm. No additional thyroid nodule.      VESSELS: Vascular structures of the neck are patent.     VISUALIZED INTRACRANIAL/ORBITS/SINUSES: No abnormality of the visualized intracranial compartment or orbits. Visualized paranasal sinuses and mastoid air cells are clear.     OTHER: No destructive osseous lesion. Multilevel cervical spondylosis. Severe neural foraminal stenosis on the left at C3-C4. The included lung apices are clear.                                                                      IMPRESSION:   1.  Small ovoid nodules dorsal and caudal to the left thyroid gland exhibits hyperenhancement and washout and are suspicious for candidate parathyroid adenomas.  2.  No additional lesion to suggest parathyroid adenoma.    Assessment and Plan:  Nila has primary hyperparathyroidism and is a candidate for surgery.  We discussed the possibility of single adenoma (85%) vs dual adenoma or hyperplasia (15%).  The imaging studies would suggest that there Are multiple candidate lesions behind the thyroid, the most suspicious of whish is in the left superior location.  I recommend proceeding with a nuclear medicine study in attempts to further localize an  "adenoma prior to surgery. If an adenoma can be localized, then I recommend a focused neck exploration with rapid PTH assay to assess the completeness of the procedure. Otherwise, she may require a 4 gland exploration. Nila is aware of the risks to the recurrent laryngeal nerve and the risk of hypocalcemia, particularly with treatment of  parathyroid hyperplasia.  She is also aware of the 1-2 % risk of \"failure to cure\".  She would like to proceed with surgery in the near future.     Ally Briggs MD    Please route clinic note to:  Primary Care Provider (PCP) and Referring Provider.      45 minutes total time spent on the date of this encounter doing: chart review, review of test results, patient visit, physical exam, education, counseling, developing plan of care, and documenting.      .           "

## 2024-01-09 ENCOUNTER — HOSPITAL ENCOUNTER (OUTPATIENT)
Dept: NUCLEAR MEDICINE | Facility: CLINIC | Age: 69
Setting detail: NUCLEAR MEDICINE
Discharge: HOME | End: 2024-01-09
Attending: SURGERY
Payer: COMMERCIAL

## 2024-01-09 DIAGNOSIS — E21.3 HYPERPARATHYROIDISM (H): ICD-10-CM

## 2024-01-09 PROCEDURE — A9500 TC99M SESTAMIBI: HCPCS | Performed by: SURGERY

## 2024-01-09 PROCEDURE — A9516 IODINE I-123 SOD IODIDE MIC: HCPCS | Performed by: SURGERY

## 2024-01-09 PROCEDURE — 343N000001 HC RX 343: Performed by: SURGERY

## 2024-01-09 PROCEDURE — 78072 PARATHYRD PLANAR W/SPECT&CT: CPT

## 2024-01-09 RX ADMIN — Medication 848 MICROCURIE: at 08:46

## 2024-01-09 RX ADMIN — Medication 25.8 MILLICURIE: at 12:04

## 2024-01-12 ENCOUNTER — HOSPITAL ENCOUNTER (OUTPATIENT)
Facility: CLINIC | Age: 69
End: 2024-01-12
Attending: ORTHOPAEDIC SURGERY | Admitting: ORTHOPAEDIC SURGERY
Payer: COMMERCIAL

## 2024-01-23 ENCOUNTER — APPOINTMENT (OUTPATIENT)
Dept: GENERAL RADIOLOGY | Facility: CLINIC | Age: 69
End: 2024-01-23
Attending: EMERGENCY MEDICINE
Payer: COMMERCIAL

## 2024-01-23 ENCOUNTER — HOSPITAL ENCOUNTER (EMERGENCY)
Facility: CLINIC | Age: 69
Discharge: HOME | End: 2024-01-23
Attending: EMERGENCY MEDICINE | Admitting: EMERGENCY MEDICINE
Payer: COMMERCIAL

## 2024-01-23 VITALS
OXYGEN SATURATION: 100 % | HEIGHT: 64 IN | TEMPERATURE: 97.8 F | SYSTOLIC BLOOD PRESSURE: 138 MMHG | RESPIRATION RATE: 16 BRPM | WEIGHT: 150 LBS | BODY MASS INDEX: 25.61 KG/M2 | DIASTOLIC BLOOD PRESSURE: 89 MMHG | HEART RATE: 71 BPM

## 2024-01-23 DIAGNOSIS — S82.831A CLOSED FRACTURE OF DISTAL END OF RIGHT FIBULA, UNSPECIFIED FRACTURE MORPHOLOGY, INITIAL ENCOUNTER: ICD-10-CM

## 2024-01-23 PROCEDURE — 96374 THER/PROPH/DIAG INJ IV PUSH: CPT

## 2024-01-23 PROCEDURE — 73590 X-RAY EXAM OF LOWER LEG: CPT | Mod: RT

## 2024-01-23 PROCEDURE — 250N000011 HC RX IP 250 OP 636: Performed by: EMERGENCY MEDICINE

## 2024-01-23 PROCEDURE — 27786 TREATMENT OF ANKLE FRACTURE: CPT | Mod: RT

## 2024-01-23 PROCEDURE — 99284 EMERGENCY DEPT VISIT MOD MDM: CPT | Mod: 25

## 2024-01-23 RX ORDER — HYDROMORPHONE HYDROCHLORIDE 2 MG/1
2 TABLET ORAL EVERY 6 HOURS PRN
Qty: 10 TABLET | Refills: 0 | Status: SHIPPED | OUTPATIENT
Start: 2024-01-23 | End: 2024-01-26

## 2024-01-23 RX ORDER — KETOROLAC TROMETHAMINE 15 MG/ML
15 INJECTION, SOLUTION INTRAMUSCULAR; INTRAVENOUS ONCE
Status: COMPLETED | OUTPATIENT
Start: 2024-01-23 | End: 2024-01-23

## 2024-01-23 RX ORDER — ETODOLAC 200 MG/1
200 CAPSULE ORAL EVERY 8 HOURS
Qty: 20 CAPSULE | Refills: 0 | Status: ON HOLD | OUTPATIENT
Start: 2024-01-23 | End: 2024-02-11

## 2024-01-23 RX ADMIN — KETOROLAC TROMETHAMINE 15 MG: 15 INJECTION, SOLUTION INTRAMUSCULAR; INTRAVENOUS at 15:37

## 2024-01-23 ASSESSMENT — ACTIVITIES OF DAILY LIVING (ADL)
ADLS_ACUITY_SCORE: 35
ADLS_ACUITY_SCORE: 35

## 2024-01-23 NOTE — ED PROVIDER NOTES
"  History     Chief Complaint:  Ankle Pain and Fall     The history is provided by the patient.      Nila Viveros is a 68 year old female with a history of hypertension and arthritis who presents to the emergency department for ankle pain and fall. The patient states that today, she slipped on her driveway and fell, injuring her right ankle. She reports that she is experiencing right ankle pain. She adds that she also \"twisted\" her knee as well. Denies head trauma. Denies any past injuries to this ankle. Denies pain anywhere else on her body. She was given pain medications by EMS en route to the emergency department which improved her pain.    Independent Historian:   None - Patient Only    Review of External Notes:   None     Medications:    EPINEPHrine (ANY BX GENERIC EQUIV) 0.3 MG/0.3ML injection 2-pack  lisinopril (ZESTRIL) 20 MG tablet  ondansetron (ZOFRAN) 4 MG tablet    Past Medical History:   Arthritis of knees  Giardia  Vein stripping  Hypertension  Knee mass  S/P LEEP   S/P tubal ligation     Past Surgical History:    R TKA   LEEP procedure  Vein stripping    Physical Exam   Patient Vitals for the past 24 hrs:   Temp Temp src Pulse Resp SpO2 Height Weight   01/23/24 1426 97.7  F (36.5  C) Oral 73 14 98 % 1.626 m (5' 4\") 68 kg (150 lb)      Physical Exam  Vitals reviewed.   Cardiovascular:      Rate and Rhythm: Normal rate.   Pulmonary:      Effort: Pulmonary effort is normal.   Abdominal:      General: Abdomen is flat.   Musculoskeletal:      Comments: Ankle: There is swelling over the lateral aspect of the right ankle.  There is no open wound.  Normal distal capillary refill.    There is tenderness over the proximal fibula and lateral knee without step-off or deformity or open wound.   Skin:     General: Skin is warm.      Capillary Refill: Capillary refill takes less than 2 seconds.   Neurological:      General: No focal deficit present.      Mental Status: She is alert.         Emergency Department " Course     Imaging:  XR Tibia and Fibula Right 2 Views   Final Result   IMPRESSION:   1.  Oblique minimally displaced fracture of the right fibula distal   metaphysis.   2.  Right total knee arthroplasty.   3.  Benign-appearing focus of sclerosis in the tibia distal   metaphysis.   4.  No joint malalignment.      SUSANA KENDALL MD            SYSTEM ID:  [id number]             Laboratory:  Labs Ordered and Resulted from Time of ED Arrival to Time of ED Departure - No data to display     Procedures   Patient placed in a ankle splint with stirrup by nurse practitioner reviewed by me in good position.    Emergency Department Course & Assessments:    Interventions:  Medications - No data to display     Assessments:  1431 I obtained history and examined the patient as noted above.     Independent Interpretation (X-rays, CTs, rhythm strip):  Positive for lateral malleolus fracture.    Consultations/Discussion of Management or Tests:  None     Social Determinants of Health affecting care:   None    Disposition:  The patient was discharged to home.     Impression & Plan      Medical Decision Making:  Patient presents after ankle injury.  X-rays confirm fibular fracture.  Did consider cam boot but due to patient's proximal knee pain in the setting of a artificial knee recommended a splint crutches for nonweightbearing until cleared by orthopedics. patient offered pain medication as a bridge to follow-up with orthopedics as an outpatient      Diagnosis:    ICD-10-CM    1. Closed fracture of distal end of right fibula, unspecified fracture morphology, initial encounter  S82.831A            Discharge Medications:  Discharge Medication List as of 1/23/2024  4:44 PM        START taking these medications    Details   etodolac (LODINE) 200 MG capsule Take 1 capsule (200 mg) by mouth every 8 hours, Disp-20 capsule, R-0, Local Print      HYDROmorphone (DILAUDID) 2 MG tablet Take 1 tablet (2 mg) by mouth every 6 hours as needed for  pain, Disp-10 tablet, R-0, Local Print            Scribe Disclosure:  I, Olman Karel, am serving as a scribe at 2:37 PM on 1/23/2024 to document services personally performed by Gregorio Freire MD based on my observations and the provider's statements to me.     1/23/2024   Gregorio Freire MD Goodman, Brian Samuel, MD  01/26/24 0345

## 2024-01-23 NOTE — DISCHARGE INSTRUCTIONS
We have recommended a splint the knee ankle boot due to your pain around your prosthetic knee and a fracture on your distal fibula.  Please ice and elevate the right knee use pain medications when needed please follow-up with orthopedics to discuss clearance to weight-bear with a cam boot.  Thanks for your patience and sorry for the accident today.

## 2024-01-23 NOTE — ED NOTES
Bed: ED30  Expected date:   Expected time:   Means of arrival:   Comments:  [id number] Right Ankle Injury

## 2024-01-30 RX ORDER — HYDROMORPHONE HYDROCHLORIDE 2 MG/1
2 TABLET ORAL EVERY 6 HOURS PRN
Status: ON HOLD | COMMUNITY
End: 2024-02-11

## 2024-02-07 ENCOUNTER — ANESTHESIA (OUTPATIENT)
Dept: SURGERY | Facility: CLINIC | Age: 69
End: 2024-02-07
Payer: COMMERCIAL

## 2024-02-07 ENCOUNTER — ANESTHESIA EVENT (OUTPATIENT)
Dept: SURGERY | Facility: CLINIC | Age: 69
End: 2024-02-07
Payer: COMMERCIAL

## 2024-02-07 ENCOUNTER — HOSPITAL ENCOUNTER (OUTPATIENT)
Facility: CLINIC | Age: 69
Discharge: HOME | End: 2024-02-08
Attending: SURGERY | Admitting: SURGERY
Payer: COMMERCIAL

## 2024-02-07 ENCOUNTER — APPOINTMENT (OUTPATIENT)
Dept: SURGERY | Facility: PHYSICIAN GROUP | Age: 69
End: 2024-02-07
Payer: COMMERCIAL

## 2024-02-07 DIAGNOSIS — E21.3 HYPERPARATHYROIDISM (H): ICD-10-CM

## 2024-02-07 DIAGNOSIS — Z96.651 S/P TOTAL KNEE ARTHROPLASTY, RIGHT: ICD-10-CM

## 2024-02-07 LAB
CALCIUM SERPL-MCNC: 10.1 MG/DL (ref 8.8–10.2)
PTH-INTACT SERPL-MCNC: 152 PG/ML (ref 15–65)
PTH-INTACT SERPL-MCNC: 26 PG/ML (ref 15–65)
PTH-INTACT SERPL-MCNC: 59 PG/ML (ref 15–65)
PTH-INTACT SERPL-MCNC: 6 PG/ML (ref 15–65)
PTH-INTACT SERPL-MCNC: 61 PG/ML (ref 15–65)
PTH-INTACT SERPL-MCNC: 73 PG/ML (ref 15–65)

## 2024-02-07 PROCEDURE — 82310 ASSAY OF CALCIUM: CPT | Performed by: SURGERY

## 2024-02-07 PROCEDURE — 250N000011 HC RX IP 250 OP 636: Performed by: SURGERY

## 2024-02-07 PROCEDURE — 250N000011 HC RX IP 250 OP 636: Performed by: NURSE ANESTHETIST, CERTIFIED REGISTERED

## 2024-02-07 PROCEDURE — 60500 EXPLORE PARATHYROID GLANDS: CPT | Mod: AS | Performed by: PHYSICIAN ASSISTANT

## 2024-02-07 PROCEDURE — 710N000009 HC RECOVERY PHASE 1, LEVEL 1, PER MIN: Performed by: SURGERY

## 2024-02-07 PROCEDURE — 370N000017 HC ANESTHESIA TECHNICAL FEE, PER MIN: Performed by: SURGERY

## 2024-02-07 PROCEDURE — 250N000009 HC RX 250: Performed by: NURSE ANESTHETIST, CERTIFIED REGISTERED

## 2024-02-07 PROCEDURE — 258N000003 HC RX IP 258 OP 636: Performed by: NURSE ANESTHETIST, CERTIFIED REGISTERED

## 2024-02-07 PROCEDURE — 60500 EXPLORE PARATHYROID GLANDS: CPT | Performed by: SURGERY

## 2024-02-07 PROCEDURE — 360N000076 HC SURGERY LEVEL 3, PER MIN: Performed by: SURGERY

## 2024-02-07 PROCEDURE — 250N000025 HC SEVOFLURANE, PER MIN: Performed by: SURGERY

## 2024-02-07 PROCEDURE — 250N000013 HC RX MED GY IP 250 OP 250 PS 637: Performed by: SURGERY

## 2024-02-07 PROCEDURE — 36415 COLL VENOUS BLD VENIPUNCTURE: CPT | Performed by: SURGERY

## 2024-02-07 PROCEDURE — 83970 ASSAY OF PARATHORMONE: CPT | Performed by: SURGERY

## 2024-02-07 PROCEDURE — 88331 PATH CONSLTJ SURG 1 BLK 1SPC: CPT | Mod: TC | Performed by: SURGERY

## 2024-02-07 PROCEDURE — 83970 ASSAY OF PARATHORMONE: CPT | Mod: 91 | Performed by: SURGERY

## 2024-02-07 PROCEDURE — 999N000141 HC STATISTIC PRE-PROCEDURE NURSING ASSESSMENT: Performed by: SURGERY

## 2024-02-07 PROCEDURE — 250N000009 HC RX 250: Performed by: ANESTHESIOLOGY

## 2024-02-07 PROCEDURE — 272N000001 HC OR GENERAL SUPPLY STERILE: Performed by: SURGERY

## 2024-02-07 RX ORDER — OXYCODONE HYDROCHLORIDE 5 MG/1
5 TABLET ORAL
Status: DISCONTINUED | OUTPATIENT
Start: 2024-02-07 | End: 2024-02-07

## 2024-02-07 RX ORDER — HYDROMORPHONE HCL IN WATER/PF 6 MG/30 ML
0.2 PATIENT CONTROLLED ANALGESIA SYRINGE INTRAVENOUS
Status: DISCONTINUED | OUTPATIENT
Start: 2024-02-07 | End: 2024-02-08 | Stop reason: HOSPADM

## 2024-02-07 RX ORDER — OXYCODONE HYDROCHLORIDE 5 MG/1
5 TABLET ORAL EVERY 4 HOURS PRN
Status: DISCONTINUED | OUTPATIENT
Start: 2024-02-07 | End: 2024-02-08 | Stop reason: HOSPADM

## 2024-02-07 RX ORDER — FENTANYL CITRATE 50 UG/ML
INJECTION, SOLUTION INTRAMUSCULAR; INTRAVENOUS PRN
Status: DISCONTINUED | OUTPATIENT
Start: 2024-02-07 | End: 2024-02-07

## 2024-02-07 RX ORDER — HYDROMORPHONE HCL IN WATER/PF 6 MG/30 ML
0.4 PATIENT CONTROLLED ANALGESIA SYRINGE INTRAVENOUS
Status: DISCONTINUED | OUTPATIENT
Start: 2024-02-07 | End: 2024-02-08 | Stop reason: HOSPADM

## 2024-02-07 RX ORDER — LISINOPRIL 20 MG/1
20 TABLET ORAL DAILY
Status: DISCONTINUED | OUTPATIENT
Start: 2024-02-07 | End: 2024-02-08 | Stop reason: HOSPADM

## 2024-02-07 RX ORDER — NALOXONE HYDROCHLORIDE 0.4 MG/ML
0.4 INJECTION, SOLUTION INTRAMUSCULAR; INTRAVENOUS; SUBCUTANEOUS
Status: DISCONTINUED | OUTPATIENT
Start: 2024-02-07 | End: 2024-02-08 | Stop reason: HOSPADM

## 2024-02-07 RX ORDER — ONDANSETRON 4 MG/1
4 TABLET, ORALLY DISINTEGRATING ORAL
Status: DISCONTINUED | OUTPATIENT
Start: 2024-02-07 | End: 2024-02-07

## 2024-02-07 RX ORDER — AMOXICILLIN 250 MG
1-2 CAPSULE ORAL 2 TIMES DAILY
Qty: 30 TABLET | Refills: 0 | Status: SHIPPED | OUTPATIENT
Start: 2024-02-07

## 2024-02-07 RX ORDER — KETOROLAC TROMETHAMINE 30 MG/ML
INJECTION, SOLUTION INTRAMUSCULAR; INTRAVENOUS PRN
Status: DISCONTINUED | OUTPATIENT
Start: 2024-02-07 | End: 2024-02-07

## 2024-02-07 RX ORDER — NALOXONE HYDROCHLORIDE 0.4 MG/ML
0.2 INJECTION, SOLUTION INTRAMUSCULAR; INTRAVENOUS; SUBCUTANEOUS
Status: DISCONTINUED | OUTPATIENT
Start: 2024-02-07 | End: 2024-02-08 | Stop reason: HOSPADM

## 2024-02-07 RX ORDER — ONDANSETRON 2 MG/ML
INJECTION INTRAMUSCULAR; INTRAVENOUS PRN
Status: DISCONTINUED | OUTPATIENT
Start: 2024-02-07 | End: 2024-02-07

## 2024-02-07 RX ORDER — CEFAZOLIN SODIUM/WATER 2 G/20 ML
2 SYRINGE (ML) INTRAVENOUS SEE ADMIN INSTRUCTIONS
Status: DISCONTINUED | OUTPATIENT
Start: 2024-02-07 | End: 2024-02-07 | Stop reason: HOSPADM

## 2024-02-07 RX ORDER — CALCIUM CARBONATE 500(1250)
TABLET ORAL
Qty: 42 TABLET | Refills: 0 | Status: ON HOLD | OUTPATIENT
Start: 2024-02-07 | End: 2024-02-13

## 2024-02-07 RX ORDER — OXYCODONE HYDROCHLORIDE 10 MG/1
10 TABLET ORAL
Status: DISCONTINUED | OUTPATIENT
Start: 2024-02-07 | End: 2024-02-07 | Stop reason: HOSPADM

## 2024-02-07 RX ORDER — OXYCODONE HYDROCHLORIDE 5 MG/1
5 TABLET ORAL
Status: DISCONTINUED | OUTPATIENT
Start: 2024-02-07 | End: 2024-02-07 | Stop reason: HOSPADM

## 2024-02-07 RX ORDER — PROPOFOL 10 MG/ML
INJECTION, EMULSION INTRAVENOUS CONTINUOUS PRN
Status: DISCONTINUED | OUTPATIENT
Start: 2024-02-07 | End: 2024-02-07

## 2024-02-07 RX ORDER — ACETAMINOPHEN 325 MG/1
650 TABLET ORAL
Status: DISCONTINUED | OUTPATIENT
Start: 2024-02-07 | End: 2024-02-07

## 2024-02-07 RX ORDER — LIDOCAINE HYDROCHLORIDE 20 MG/ML
INJECTION, SOLUTION INFILTRATION; PERINEURAL PRN
Status: DISCONTINUED | OUTPATIENT
Start: 2024-02-07 | End: 2024-02-07

## 2024-02-07 RX ORDER — ACETAMINOPHEN 325 MG/1
650 TABLET ORAL EVERY 4 HOURS PRN
Status: ON HOLD | COMMUNITY
Start: 2024-02-07 | End: 2024-02-11

## 2024-02-07 RX ORDER — FENTANYL CITRATE 50 UG/ML
50 INJECTION, SOLUTION INTRAMUSCULAR; INTRAVENOUS EVERY 5 MIN PRN
Status: DISCONTINUED | OUTPATIENT
Start: 2024-02-07 | End: 2024-02-07 | Stop reason: HOSPADM

## 2024-02-07 RX ORDER — ACETAMINOPHEN 325 MG/1
975 TABLET ORAL ONCE
Status: DISCONTINUED | OUTPATIENT
Start: 2024-02-07 | End: 2024-02-07 | Stop reason: HOSPADM

## 2024-02-07 RX ORDER — OXYCODONE HYDROCHLORIDE 5 MG/1
5-10 TABLET ORAL EVERY 4 HOURS PRN
Qty: 10 TABLET | Refills: 0 | Status: ON HOLD | OUTPATIENT
Start: 2024-02-07 | End: 2024-02-11

## 2024-02-07 RX ORDER — BUPIVACAINE HYDROCHLORIDE 5 MG/ML
INJECTION, SOLUTION PERINEURAL PRN
Status: DISCONTINUED | OUTPATIENT
Start: 2024-02-07 | End: 2024-02-07 | Stop reason: HOSPADM

## 2024-02-07 RX ORDER — LIDOCAINE 40 MG/G
CREAM TOPICAL
Status: DISCONTINUED | OUTPATIENT
Start: 2024-02-07 | End: 2024-02-08 | Stop reason: HOSPADM

## 2024-02-07 RX ORDER — ONDANSETRON 2 MG/ML
4 INJECTION INTRAMUSCULAR; INTRAVENOUS EVERY 6 HOURS PRN
Status: DISCONTINUED | OUTPATIENT
Start: 2024-02-07 | End: 2024-02-08 | Stop reason: HOSPADM

## 2024-02-07 RX ORDER — LIDOCAINE 40 MG/G
CREAM TOPICAL
Status: DISCONTINUED | OUTPATIENT
Start: 2024-02-07 | End: 2024-02-07 | Stop reason: HOSPADM

## 2024-02-07 RX ORDER — SCOLOPAMINE TRANSDERMAL SYSTEM 1 MG/1
1 PATCH, EXTENDED RELEASE TRANSDERMAL ONCE
Status: COMPLETED | OUTPATIENT
Start: 2024-02-07 | End: 2024-02-08

## 2024-02-07 RX ORDER — SODIUM CHLORIDE, SODIUM LACTATE, POTASSIUM CHLORIDE, CALCIUM CHLORIDE 600; 310; 30; 20 MG/100ML; MG/100ML; MG/100ML; MG/100ML
INJECTION, SOLUTION INTRAVENOUS CONTINUOUS
Status: DISCONTINUED | OUTPATIENT
Start: 2024-02-07 | End: 2024-02-07 | Stop reason: HOSPADM

## 2024-02-07 RX ORDER — ACETAMINOPHEN 500 MG
1000 TABLET ORAL EVERY 6 HOURS PRN
COMMUNITY
Start: 2024-02-07

## 2024-02-07 RX ORDER — OXYCODONE HYDROCHLORIDE 10 MG/1
10 TABLET ORAL EVERY 4 HOURS PRN
Status: DISCONTINUED | OUTPATIENT
Start: 2024-02-07 | End: 2024-02-08 | Stop reason: HOSPADM

## 2024-02-07 RX ORDER — FENTANYL CITRATE 50 UG/ML
25 INJECTION, SOLUTION INTRAMUSCULAR; INTRAVENOUS EVERY 5 MIN PRN
Status: DISCONTINUED | OUTPATIENT
Start: 2024-02-07 | End: 2024-02-07 | Stop reason: HOSPADM

## 2024-02-07 RX ORDER — ONDANSETRON 4 MG/1
4 TABLET, ORALLY DISINTEGRATING ORAL EVERY 30 MIN PRN
Status: DISCONTINUED | OUTPATIENT
Start: 2024-02-07 | End: 2024-02-07 | Stop reason: HOSPADM

## 2024-02-07 RX ORDER — KETOROLAC TROMETHAMINE 15 MG/ML
15 INJECTION, SOLUTION INTRAMUSCULAR; INTRAVENOUS
Status: DISCONTINUED | OUTPATIENT
Start: 2024-02-07 | End: 2024-02-07 | Stop reason: HOSPADM

## 2024-02-07 RX ORDER — CALCIUM CARBONATE 500 MG/1
1000 TABLET, CHEWABLE ORAL 3 TIMES DAILY
Status: DISCONTINUED | OUTPATIENT
Start: 2024-02-07 | End: 2024-02-08 | Stop reason: HOSPADM

## 2024-02-07 RX ORDER — SODIUM CHLORIDE, SODIUM LACTATE, POTASSIUM CHLORIDE, CALCIUM CHLORIDE 600; 310; 30; 20 MG/100ML; MG/100ML; MG/100ML; MG/100ML
INJECTION, SOLUTION INTRAVENOUS CONTINUOUS PRN
Status: DISCONTINUED | OUTPATIENT
Start: 2024-02-07 | End: 2024-02-07

## 2024-02-07 RX ORDER — IBUPROFEN 200 MG
600 TABLET ORAL EVERY 6 HOURS PRN
COMMUNITY
Start: 2024-02-07 | End: 2024-02-08

## 2024-02-07 RX ORDER — DEXAMETHASONE SODIUM PHOSPHATE 4 MG/ML
INJECTION, SOLUTION INTRA-ARTICULAR; INTRALESIONAL; INTRAMUSCULAR; INTRAVENOUS; SOFT TISSUE PRN
Status: DISCONTINUED | OUTPATIENT
Start: 2024-02-07 | End: 2024-02-07

## 2024-02-07 RX ORDER — ONDANSETRON 2 MG/ML
4 INJECTION INTRAMUSCULAR; INTRAVENOUS EVERY 30 MIN PRN
Status: DISCONTINUED | OUTPATIENT
Start: 2024-02-07 | End: 2024-02-07 | Stop reason: HOSPADM

## 2024-02-07 RX ORDER — CEFAZOLIN SODIUM/WATER 2 G/20 ML
2 SYRINGE (ML) INTRAVENOUS
Status: COMPLETED | OUTPATIENT
Start: 2024-02-07 | End: 2024-02-07

## 2024-02-07 RX ORDER — IBUPROFEN 600 MG/1
600 TABLET, FILM COATED ORAL EVERY 6 HOURS PRN
Status: DISCONTINUED | OUTPATIENT
Start: 2024-02-07 | End: 2024-02-08 | Stop reason: HOSPADM

## 2024-02-07 RX ORDER — PROPOFOL 10 MG/ML
INJECTION, EMULSION INTRAVENOUS PRN
Status: DISCONTINUED | OUTPATIENT
Start: 2024-02-07 | End: 2024-02-07

## 2024-02-07 RX ORDER — ONDANSETRON 4 MG/1
4 TABLET, ORALLY DISINTEGRATING ORAL EVERY 6 HOURS PRN
Status: DISCONTINUED | OUTPATIENT
Start: 2024-02-07 | End: 2024-02-08 | Stop reason: HOSPADM

## 2024-02-07 RX ORDER — LABETALOL HYDROCHLORIDE 5 MG/ML
10 INJECTION, SOLUTION INTRAVENOUS EVERY 5 MIN PRN
Status: DISCONTINUED | OUTPATIENT
Start: 2024-02-07 | End: 2024-02-07 | Stop reason: HOSPADM

## 2024-02-07 RX ORDER — ACETAMINOPHEN 325 MG/1
650 TABLET ORAL EVERY 6 HOURS PRN
Status: DISCONTINUED | OUTPATIENT
Start: 2024-02-07 | End: 2024-02-08 | Stop reason: HOSPADM

## 2024-02-07 RX ADMIN — ACETAMINOPHEN 650 MG: 325 TABLET, FILM COATED ORAL at 23:35

## 2024-02-07 RX ADMIN — LIDOCAINE HYDROCHLORIDE 50 MG: 20 INJECTION, SOLUTION INFILTRATION; PERINEURAL at 11:03

## 2024-02-07 RX ADMIN — Medication 2 G: at 10:52

## 2024-02-07 RX ADMIN — PROPOFOL 200 MCG/KG/MIN: 10 INJECTION, EMULSION INTRAVENOUS at 11:08

## 2024-02-07 RX ADMIN — KETOROLAC TROMETHAMINE 15 MG: 30 INJECTION, SOLUTION INTRAMUSCULAR at 12:02

## 2024-02-07 RX ADMIN — HYDROMORPHONE HYDROCHLORIDE 1 MG: 1 INJECTION, SOLUTION INTRAMUSCULAR; INTRAVENOUS; SUBCUTANEOUS at 11:12

## 2024-02-07 RX ADMIN — SCOPALAMINE 1 PATCH: 1 PATCH, EXTENDED RELEASE TRANSDERMAL at 09:50

## 2024-02-07 RX ADMIN — ONDANSETRON 4 MG: 2 INJECTION INTRAMUSCULAR; INTRAVENOUS at 11:54

## 2024-02-07 RX ADMIN — PHENYLEPHRINE HYDROCHLORIDE 100 MCG: 10 INJECTION INTRAVENOUS at 13:11

## 2024-02-07 RX ADMIN — FENTANYL CITRATE 50 MCG: 50 INJECTION INTRAMUSCULAR; INTRAVENOUS at 11:20

## 2024-02-07 RX ADMIN — SODIUM CHLORIDE, POTASSIUM CHLORIDE, SODIUM LACTATE AND CALCIUM CHLORIDE: 600; 310; 30; 20 INJECTION, SOLUTION INTRAVENOUS at 10:15

## 2024-02-07 RX ADMIN — FENTANYL CITRATE 100 MCG: 50 INJECTION INTRAMUSCULAR; INTRAVENOUS at 11:03

## 2024-02-07 RX ADMIN — FENTANYL CITRATE 50 MCG: 50 INJECTION INTRAMUSCULAR; INTRAVENOUS at 11:28

## 2024-02-07 RX ADMIN — SODIUM CHLORIDE, POTASSIUM CHLORIDE, SODIUM LACTATE AND CALCIUM CHLORIDE: 600; 310; 30; 20 INJECTION, SOLUTION INTRAVENOUS at 10:55

## 2024-02-07 RX ADMIN — DEXAMETHASONE SODIUM PHOSPHATE 4 MG: 4 INJECTION, SOLUTION INTRA-ARTICULAR; INTRALESIONAL; INTRAMUSCULAR; INTRAVENOUS; SOFT TISSUE at 11:04

## 2024-02-07 RX ADMIN — PHENYLEPHRINE HYDROCHLORIDE 200 MCG: 10 INJECTION INTRAVENOUS at 12:48

## 2024-02-07 RX ADMIN — PROPOFOL 180 MG: 10 INJECTION, EMULSION INTRAVENOUS at 11:04

## 2024-02-07 RX ADMIN — MIDAZOLAM 2 MG: 1 INJECTION INTRAMUSCULAR; INTRAVENOUS at 10:55

## 2024-02-07 RX ADMIN — Medication 100 MG: at 11:04

## 2024-02-07 RX ADMIN — ACETAMINOPHEN 650 MG: 325 TABLET, FILM COATED ORAL at 16:16

## 2024-02-07 RX ADMIN — CALCIUM CARBONATE (ANTACID) CHEW TAB 500 MG 1000 MG: 500 CHEW TAB at 20:36

## 2024-02-07 RX ADMIN — OXYCODONE HYDROCHLORIDE 5 MG: 5 TABLET ORAL at 16:15

## 2024-02-07 RX ADMIN — PHENYLEPHRINE HYDROCHLORIDE 200 MCG: 10 INJECTION INTRAVENOUS at 12:56

## 2024-02-07 ASSESSMENT — ACTIVITIES OF DAILY LIVING (ADL)
ADLS_ACUITY_SCORE: 25
ADLS_ACUITY_SCORE: 25
ADLS_ACUITY_SCORE: 26
ADLS_ACUITY_SCORE: 25
ADLS_ACUITY_SCORE: 26
ADLS_ACUITY_SCORE: 26
ADLS_ACUITY_SCORE: 21
ADLS_ACUITY_SCORE: 26

## 2024-02-07 NOTE — ANESTHESIA PROCEDURE NOTES
Airway         Procedure Start/Stop Times: 2/7/2024 11:07 AM  Staff -        Anesthesiologist:  Mabel Prasad MD       CRNA: Mirella Rider APRN CRNA       Performed By: CRNAIndications and Patient Condition       Indications for airway management: airway protection       Induction type:intravenous       Mask difficulty assessment: 1 - vent by mask    Final Airway Details       Final airway type: endotracheal airway       Successful airway: ETT - single and NIM  Endotracheal Airway Details        ETT size (mm): 7.0       Cuffed: yes       Successful intubation technique: video laryngoscopy       VL Blade Size: Glidescope 3       Grade View of Cords: 1       Adjucts: stylet       Position: Center       Measured from: lips       Secured at (cm): 22       Bite block used: Soft    Post intubation assessment        Placement verified by: capnometry, equal breath sounds and chest rise        Number of attempts at approach: 1       Number of other approaches attempted: 0       Secured with: tape       Ease of procedure: easy       Dentition: Intact and Unchanged    Medication(s) Administered   Medication Administration Time: 2/7/2024 11:07 AM

## 2024-02-07 NOTE — ANESTHESIA PREPROCEDURE EVALUATION
Anesthesia Pre-Procedure Evaluation    Patient: Nila Viveros   MRN: [id number] : 1955        Procedure : Procedure(s):  PARATHYROIDECTOMY, POSSIBLE CERVICAL EXPLORATION          Past Medical History:   Diagnosis Date    Degenerative arthritis of knee, bilateral     Giardia     has intermittent flares of diarrhea    H/O vein stripping     Right leg    Hx of prior ablation treatment     Lap ablation for uterine fibroids    Hypertension     Infection due to 2019 novel coronavirus     Knee mass, right     Pap smear for cervical cancer screening     S/P LEEP     S/P right knee arthroscopy     S/P tubal ligation       Past Surgical History:   Procedure Laterality Date    ARTHROPLASTY KNEE Right 10/3/2023    Procedure: RIGHT TOTAL KNEE ARTHROPLASTY;  Surgeon: Darshan Caban MD;  Location: [facility] OR      Allergies   Allergen Reactions    Azithromycin Hives    Bee Venom Hives    Zinc Nausea    Nickel Rash    Silver Rash      Social History     Tobacco Use    Smoking status: Never     Passive exposure: Never    Smokeless tobacco: Never   Substance Use Topics    Alcohol use: Not Currently      Wt Readings from Last 1 Encounters:   24 70.8 kg (156 lb 1.6 oz)        Anesthesia Evaluation            ROS/MED HX  ENT/Pulmonary:  - neg pulmonary ROS     Neurologic:  - neg neurologic ROS     Cardiovascular:     (+)  hypertension- -   -  - -                                      METS/Exercise Tolerance: >4 METS    Hematologic:  - neg hematologic  ROS     Musculoskeletal:   (+)  arthritis,             GI/Hepatic:  - neg GI/hepatic ROS     Renal/Genitourinary:  - neg Renal ROS     Endo:     (+)          thyroid problem, hyperparathyroidism,    Obesity,       Psychiatric/Substance Use:  - neg psychiatric ROS     Infectious Disease:  - neg infectious disease ROS     Malignancy:  - neg malignancy ROS     Other:  - neg other ROS          Physical Exam    Airway        Mallampati: II   TM distance: > 3 FB   Neck  "ROM: full   Mouth opening: > 3 cm    Respiratory Devices and Support         Dental       (+) Modest Abnormalities - crowns, retainers, 1 or 2 missing teeth      Cardiovascular   cardiovascular exam normal       Rhythm and rate: regular and normal     Pulmonary   pulmonary exam normal        breath sounds clear to auscultation           OUTSIDE LABS:  CBC:   Lab Results   Component Value Date    WBC 7.9 10/09/2023    HGB 10.5 (L) 10/09/2023    HGB 9.8 (L) 10/05/2023    HCT 32.7 (L) 10/09/2023     10/09/2023     BMP:   Lab Results   Component Value Date     10/09/2023    POTASSIUM 4.5 10/09/2023    POTASSIUM 4.2 10/03/2023    CHLORIDE 104 10/09/2023    CO2 25 10/09/2023    BUN 12.4 10/09/2023    CR 0.59 10/09/2023    GLC 93 10/09/2023     (H) 10/05/2023     COAGS: No results found for: \"PTT\", \"INR\", \"FIBR\"  POC: No results found for: \"BGM\", \"HCG\", \"HCGS\"  HEPATIC: No results found for: \"ALBUMIN\", \"PROTTOTAL\", \"ALT\", \"AST\", \"GGT\", \"ALKPHOS\", \"BILITOTAL\", \"BILIDIRECT\", \"KATELYNN\"  OTHER:   Lab Results   Component Value Date    JARRETT 10.9 (H) 10/09/2023       Anesthesia Plan    ASA Status:  2    NPO Status:  NPO Appropriate    Anesthesia Type: General.     - Airway: ETT   Induction: Intravenous.   Maintenance: Balanced.   Techniques and Equipment:     - Airway: Video-Laryngoscope       Consents    Anesthesia Plan(s) and associated risks, benefits, and realistic alternatives discussed. Questions answered and patient/representative(s) expressed understanding.     - Discussed: Risks, Benefits and Alternatives for the PROCEDURE were discussed     - Discussed with:  Patient       Use of blood products discussed: Yes.     - Discussed with: Patient.     - Consented: consented to blood products            Reason for refusal: other.     Postoperative Care    Pain management: IV analgesics, Oral pain medications.   PONV prophylaxis: Ondansetron (or other 5HT-3), Dexamethasone or Solumedrol     Comments:    Other " "Comments: VL, ETT, succinylcholine. Avoid paralysis. Dilaudid PRN.           Mabel Prasad MD    I have reviewed the pertinent notes and labs in the chart from the past 30 days and (re)examined the patient.  Any updates or changes from those notes are reflected in this note.              # Overweight: Estimated body mass index is 26.95 kg/m  as calculated from the following:    Height as of this encounter: 1.621 m (5' 3.82\").    Weight as of this encounter: 70.8 kg (156 lb 1.6 oz).      "

## 2024-02-07 NOTE — PROGRESS NOTES
Patient is non-weight bearing to R leg. Right leg in cast boot.     Constance Arrington RN on 2/7/2024 at 9:56 AM

## 2024-02-07 NOTE — ANESTHESIA POSTPROCEDURE EVALUATION
Patient: Nila Viveros    Procedure: Procedure(s):  SUBTOTAL PARATHYROIDECTOMY       Anesthesia Type:  General    Note:  Disposition: Outpatient   Postop Pain Control: Uneventful            Sign Out: Well controlled pain   PONV: No   Neuro/Psych: Uneventful            Sign Out: Acceptable/Baseline neuro status   Airway/Respiratory: Uneventful            Sign Out: Acceptable/Baseline resp. status   CV/Hemodynamics: Uneventful            Sign Out: Acceptable CV status; No obvious hypovolemia; No obvious fluid overload   Other NRE: NONE   DID A NON-ROUTINE EVENT OCCUR? No           Last vitals:  Vitals Value Taken Time   /81 02/07/24 1430   Temp 97.6  F (36.4  C) 02/07/24 1403   Pulse 77 02/07/24 1434   Resp 13 02/07/24 1434   SpO2 91 % 02/07/24 1434   Vitals shown include unfiled device data.    Electronically Signed By: Mabel Prasad MD  February 7, 2024  2:35 PM

## 2024-02-07 NOTE — OP NOTE
General Surgery Operative Note    PREOPERATIVE DIAGNOSIS:  Primary hyperparathyroidism.    POSTOPERATIVE DIAGNOSIS:  Primary hyperparathyroidism.    PROCEDURE: Excision of left superior parathyroid adenoma    ANESTHESIA:  General.    PREOPERATIVE MEDICATIONS:  Ancef 2 gm.    SURGEON:  Ally Briggs MD    ASSISTANT:  Christine Maurer PA-C  - the physician assistant was medically necessary in providing adequate exposure in the operating field, maintaining hemostasis, cutting suture, clamping and ligating blood vessels, and visualization of the anatomic structures throughout the surgical procedure.     ESTIMATED BLOOD LOSS:  10 ml    INDICATIONS:  Nila Viveros is a 68 year old female who has primary hyperparathyroidism. She has a localizing sestamibi in the left superior neck.  She presents today for neck exploration, excision of parathyroid adenoma.  Her PTH preoperatively is 59.    DESCRIPTION OF PROCEDURE:  The patient was placed supine, head and neck in extension and a bump behind the scapulae.  A suitable transverse cervical neck crease was identified and marked. A time-out was performed verifying correct patient and procedure. A transverse incision was made sharply, then deepened to the level of the platysma with electrocautery. The platysma was divided and superior and inferior subplatysmal flaps were raised.  Midline fascia opened and reflected to the left.     An abnormal parathyroid was identified at the left superior location.  It was meticulously dissected from the surrounding tissue and submitted for frozen section which confirmed a 394 milligram hypercellular parathyroid.  The site was irrigated and inspected for hemostasis.  The patient's incision site was then closed with running 3-0 Vicryl for the midline fascia, interrupted 3-0 vicryl for platysma and 4-0 subcuticular Monocryl for skin.The PTH assays were sent at 10 and 20 minutes post-excision and the first value came back as a PTH spike at  152 with the second one falling to 21. An additionl blood draw was performed and this showed persistently elevated PTH to 73, so we re-opened the neck incision.     An additional mildly enlarged parathyroid was identified within the tip of the thymus gland on the left. We then went to the right side, where a right superior gland was identified which was slightly smaller than the first parathyroid excised. The right superior gland was more normal in size, approximately 80 mg. Therefore we elected to make the right inferior gland the remnant and trimmed it, sending a portion of the gland to pathology as a biopsy. Once we had the remnant fashioned, the right superior parathyroid was excised and the left inferior parathyroid was excised, including the tip of the left thymus.        The patient's incision site was then closed with running 3-0 Vicryl for the midline fascia, interrupted 3-0 vicryl for platysma and 4-0 subcuticular Monocryl for skin.  The incision was covered with skin glue and a steri-strip. The patient transferred to recovery in good condition.    INTRAOPERATIVE FINDINGS:  1.  A 394 milligram hypercellular left superior parathyroid correlated nicely with sestamibi scan and was excised  2.  PTH assay initially did not decline and was still 73 from 59 after resection of the left superior parathyroid.   3. Enlarged right superior parathyroid weighing 218 mg was excised entirely  4. Minimally enlarged right inferior parathyroid was biopsied and left in situ, it was marked with a prolene suture.   5. Minimally enlarged left inferior parathyroid was identified within the tip of the thymus and was excised entirely, weighing 70 mg.   6.  Grossly normal thyroid.  7.  Bilateral recurrent laryngeal nerves visualized, function was confirmed by nerve monitor    Specimens:   ID Type Source Tests Collected by Time Destination   1 : LEFT SUPERIOR PARATHYROID Tissue Parathyroid, Superior, Left SURGICAL PATHOLOGY EXAM  Ally Briggs MD 2/7/2024 11:33 AM    2 : RIGHT SUPERIOR PARATHYROID Tissue Parathyroid, Superior, Right SURGICAL PATHOLOGY EXAM lAly Briggs MD 2/7/2024  1:09 PM    3 : RIGHT INFERIOR PARATHYROID BIOPSY Biopsy Parathyroid, Inferior, Right SURGICAL PATHOLOGY EXAM Ally Briggs MD 2/7/2024  1:10 PM    4 : TIP OF LEFT THYMUS WITH LEFT INFERIOR PARATHYROID Tissue Thymus SURGICAL PATHOLOGY EXAM Ally Briggs MD 2/7/2024  1:18 PM    A : PTH OR #1 Blood Vein PARATHYROID HORMONE INTACT INTRAOPERATIVE Ally Briggs MD 2/7/2024 11:43 AM    B : pth or  #2 Blood Vein PARATHYROID HORMONE INTACT INTRAOPERATIVE Ally Briggs MD 2/7/2024 11:50 AM    C : PTH Blood- OR, call *[phone] Blood Vein PARATHYROID HORMONE INTACT INTRAOPERATIVE Ally Briggs MD 2/7/2024 12:16 PM    D : PTH Blood- OR, call *[phone] Blood Vein PARATHYROID HORMONE INTACT INTRAOPERATIVE Ally Briggs MD 2/7/2024  1:29 PM          Ally Briggs MD

## 2024-02-07 NOTE — DISCHARGE INSTRUCTIONS
Remove Scope Patch: Tomorrow, Thursday February 8th at 7 PM    How to Remove Scope Patch:  When the scopolamine patch is no longer needed, remove the patch and fold it in half with the sticky side together and dispose of it.  Wash your hands and the area behind your ear thoroughly with soap and water to remove any traces of scopolamine from the area.  Make sure not to touch your eyes after handling the scope patch and wash your hands thoroughly.        Maximum acetaminophen (Tylenol) dose from all sources should not exceed 4 grams (4000 mg) per day.     Maximum ibuprofen (Advil) dose from all sources should not exceed 2.5 grams (2,400 mg) per day.     Continue with Regular diet, choosing soft foods.   Follow up in 2 weeks with Dr. Briggs's team.

## 2024-02-07 NOTE — PHARMACY-ADMISSION MEDICATION HISTORY
Med rec completed by pre-admitting RNELENI.    PTA Med List   Medication Sig Last Dose    acetaminophen (TYLENOL) 325 MG tablet Take 2 tablets (650 mg) by mouth every 4 hours as needed for pain (mild pain)     calcium carbonate (OS-JARRETT) 500 MG tablet Take 2 tablets (1,000 mg) by mouth 2 times daily for 7 days, THEN 2 tablets (1,000 mg) daily for 7 days.     cholecalciferol (VITAMIN D3) 125 mcg (5000 units) capsule Take 125 mcg by mouth daily 2/7/2024    EPINEPHrine (ANY BX GENERIC EQUIV) 0.3 MG/0.3ML injection 2-pack Inject 0.3 mg into the muscle as needed for anaphylaxis May repeat one time in 5-15 minutes if response to initial dose is inadequate. Unknown    etodolac (LODINE) 200 MG capsule Take 1 capsule (200 mg) by mouth every 8 hours 1/31/2024    FLUTICASONE PROPIONATE, NASAL, NA Spray 1 spray in nostril daily as needed 2/7/2024    HYDROmorphone (DILAUDID) 2 MG tablet Take 2 mg by mouth every 6 hours as needed for severe pain More than a month    lisinopril (ZESTRIL) 20 MG tablet Take 1 tablet by mouth daily 2/6/2024    melatonin 3 MG tablet Take 3 mg by mouth nightly as needed for sleep Past Week    Misc Natural Products (OSTEO BI-FLEX ADV JOINT SHIELD) TABS Take 1,500 mg by mouth daily 2/7/2024    ondansetron (ZOFRAN) 4 MG tablet Take 1 tablet (4 mg) by mouth every 8 hours as needed for nausea More than a month    Probiotic Product (PROBIOTIC DAILY) CAPS Take 1 capsule by mouth daily 2/7/2024    Ubiquinol 100 MG CAPS Take 100 mg by mouth daily 2/7/2024

## 2024-02-07 NOTE — ANESTHESIA CARE TRANSFER NOTE
Patient: Nila Viveros    Procedure: Procedure(s):  SUBTOTAL PARATHYROIDECTOMY       Diagnosis: Hyperparathyroidism (H24) [E21.3]  Diagnosis Additional Information: No value filed.    Anesthesia Type:   General     Note:    Oropharynx: oropharynx clear of all foreign objects  Level of Consciousness: awake  Oxygen Supplementation: face mask    Independent Airway: airway patency satisfactory and stable  Dentition: dentition unchanged  Vital Signs Stable: post-procedure vital signs reviewed and stable  Report to RN Given: handoff report given  Patient transferred to: PACU    Handoff Report: Identifed the Patient, Identified the Reponsible Provider, Reviewed the pertinent medical history, Discussed the surgical course, Reviewed Intra-OP anesthesia mangement and issues during anesthesia, Set expectations for post-procedure period and Allowed opportunity for questions and acknowledgement of understanding      Vitals:  Vitals Value Taken Time   BP     Temp     Pulse 94 02/07/24 1404   Resp 11 02/07/24 1404   SpO2 98 % 02/07/24 1404   Vitals shown include unfiled device data.    Electronically Signed By: HA Faith CRNA  February 7, 2024  2:05 PM

## 2024-02-08 VITALS
OXYGEN SATURATION: 98 % | WEIGHT: 155 LBS | SYSTOLIC BLOOD PRESSURE: 131 MMHG | BODY MASS INDEX: 26.46 KG/M2 | RESPIRATION RATE: 16 BRPM | TEMPERATURE: 97.3 F | DIASTOLIC BLOOD PRESSURE: 71 MMHG | HEART RATE: 59 BPM | HEIGHT: 64 IN

## 2024-02-08 LAB
CALCIUM SERPL-MCNC: 9.2 MG/DL (ref 8.8–10.2)
PATH REPORT.COMMENTS IMP SPEC: NORMAL
PATH REPORT.COMMENTS IMP SPEC: NORMAL
PATH REPORT.FINAL DX SPEC: NORMAL
PATH REPORT.GROSS SPEC: NORMAL
PATH REPORT.INTRAOP OBS SPEC DOC: NORMAL
PATH REPORT.MICROSCOPIC SPEC OTHER STN: NORMAL
PATH REPORT.RELEVANT HX SPEC: NORMAL
PHOTO IMAGE: NORMAL
PTH-INTACT SERPL-MCNC: 5 PG/ML (ref 15–65)

## 2024-02-08 PROCEDURE — 36415 COLL VENOUS BLD VENIPUNCTURE: CPT | Performed by: SURGERY

## 2024-02-08 PROCEDURE — 88331 PATH CONSLTJ SURG 1 BLK 1SPC: CPT | Mod: 26 | Performed by: PATHOLOGY

## 2024-02-08 PROCEDURE — 88305 TISSUE EXAM BY PATHOLOGIST: CPT | Mod: 26 | Performed by: PATHOLOGY

## 2024-02-08 PROCEDURE — 83970 ASSAY OF PARATHORMONE: CPT | Mod: 91 | Performed by: SURGERY

## 2024-02-08 PROCEDURE — 82310 ASSAY OF CALCIUM: CPT | Performed by: SURGERY

## 2024-02-08 PROCEDURE — 250N000013 HC RX MED GY IP 250 OP 250 PS 637: Performed by: SURGERY

## 2024-02-08 RX ORDER — CALCITRIOL 0.25 UG/1
0.5 CAPSULE, LIQUID FILLED ORAL 2 TIMES DAILY
Status: DISCONTINUED | OUTPATIENT
Start: 2024-02-08 | End: 2024-02-08 | Stop reason: HOSPADM

## 2024-02-08 RX ORDER — CALCITRIOL 0.5 UG/1
0.5 CAPSULE, LIQUID FILLED ORAL 2 TIMES DAILY
Qty: 60 CAPSULE | Refills: 0 | Status: ON HOLD | OUTPATIENT
Start: 2024-02-08 | End: 2024-02-13

## 2024-02-08 RX ADMIN — CALCITRIOL 0.5 MCG: 0.25 CAPSULE ORAL at 09:11

## 2024-02-08 RX ADMIN — CALCIUM CARBONATE (ANTACID) CHEW TAB 500 MG 1000 MG: 500 CHEW TAB at 07:55

## 2024-02-08 RX ADMIN — LISINOPRIL 20 MG: 20 TABLET ORAL at 07:55

## 2024-02-08 ASSESSMENT — ACTIVITIES OF DAILY LIVING (ADL)
ADLS_ACUITY_SCORE: 26

## 2024-02-08 NOTE — PLAN OF CARE
Goal Outcome Evaluation:      Plan of Care Reviewed With: patient, child    Overall Patient Progress: improvingOverall Patient Progress: improving     Reviewed discharge instructions with patient and daughter. Questions answered. Patient discharged to home with daughter, meds ( Senna, calcium, oxycodone), discharge instructions, and belongings. Pt and daughter agreeable to discharge plan.

## 2024-02-08 NOTE — PROGRESS NOTES
"[facility]   General Surgery Progress Note           Assessment and Plan:   Assessment:   1 Day Post-Op  s/p Procedure(s):  SUBTOTAL PARATHYROIDECTOMY        Plan:   -discharge home today  -Rx Oxy, Senokot, Calcium, Rocaltrol  -follow up with Dr. Briggs in 2 weeks         Interval History:   Feels well, daughter at bedside. Pain well controlled. Tolerating diet, up walking and voiding ok. Feels ready for home.         Physical Exam:   Blood pressure 131/71, pulse 59, temperature 97.3  F (36.3  C), temperature source Temporal, resp. rate 16, height 1.626 m (5' 4\"), weight 70.3 kg (155 lb), SpO2 98%.    I/O last 3 completed shifts:  In: 1800 [I.V.:1800]  Out: 1720 [Urine:1720]    Neck   Incision with mild erythema/irritation. Incision cdi.          Data:     Recent Labs   Lab 02/08/24  0713 02/07/24  1810   JARRETT 9.2 10.1         Christine Maurer PA-C  Text page: [phone] 8 am to 4 pm  After 4 pm, call [phone]       "

## 2024-02-08 NOTE — PLAN OF CARE
Goal Outcome Evaluation:      Plan of Care Reviewed With: patient    Overall Patient Progress: improvingOverall Patient Progress: improving     Arrived to room 605 from PACU at 1640  via cart, transferred to bed via pivot, alert and oriented x 4, oriented to room and call system, dressing CDI, CMS intact, IV patent and infusing, rates pain 2/10. Alvarez ice chips well. Frequent VS started.     Pt A/O x4. VSS. PIV infusing. Pain managed with PRN Tylenol and Oxycodone. Assist x1 with walker gait belt to bedside commode. NWB to RLE, boot on when OOB. Voiding good amounts. Tolerating a regular diet well. CMS intact. Dressing CDI. Plan is to stay tonight and possible discharge home with daughter in AM.

## 2024-02-09 ENCOUNTER — TELEPHONE (OUTPATIENT)
Dept: SURGERY | Facility: CLINIC | Age: 69
End: 2024-02-09
Payer: COMMERCIAL

## 2024-02-09 NOTE — TELEPHONE ENCOUNTER
S/p  Excision of left superior parathyroid adenoma   Procedure date: 2/7/24  Surgeon: Dr. Briggs    Patient is calling to report that she had some tingling around her mouth last evening.  Today, she has tingling around her mouth and the end of her nose and in her fingers.    Wondering what to do?     Most recent calcium level was 9.2 prior to discharge yesterday.      She is currently taking 2 tabs of calcium carbonate twice a day per calcium taper.  Rocalcitrol Q day.      -Reassured patient.   -patient is ok to take and extra dose of calcium (2 tabs).  If symptoms do not resolve after an hour or if she is needing to take more than one extra dose of calcium per day,   She will call clinic for possible calcium lab check.      Patient verbalizes understanding and agrees with plan.

## 2024-02-09 NOTE — TELEPHONE ENCOUNTER
Name of caller: Patient    Reason for Call:  Tingling    Surgeon:  Dr. Briggs    Recent Surgery:  Yes.    If yes, when & what type:  2/7/24 MGB      Best phone number to reach pt at is: [phone]  Ok to leave a message with medical info? Yes.    Pharmacy preferred (if calling for a refill): na

## 2024-02-10 ENCOUNTER — TELEPHONE (OUTPATIENT)
Dept: SURGERY | Facility: CLINIC | Age: 69
End: 2024-02-10
Payer: COMMERCIAL

## 2024-02-10 ENCOUNTER — HOSPITAL ENCOUNTER (INPATIENT)
Facility: CLINIC | Age: 69
LOS: 1 days | Discharge: HOME | DRG: 093 | End: 2024-02-13
Attending: EMERGENCY MEDICINE | Admitting: HOSPITALIST
Payer: COMMERCIAL

## 2024-02-10 DIAGNOSIS — E83.51 HYPOCALCEMIA: ICD-10-CM

## 2024-02-10 DIAGNOSIS — E21.3 HYPERPARATHYROIDISM (H): ICD-10-CM

## 2024-02-10 DIAGNOSIS — E83.42 HYPOMAGNESEMIA: ICD-10-CM

## 2024-02-10 LAB
ALBUMIN SERPL BCG-MCNC: 4.3 G/DL (ref 3.5–5.2)
ALBUMIN UR-MCNC: NEGATIVE MG/DL
ALP SERPL-CCNC: 164 U/L (ref 40–150)
ALT SERPL W P-5'-P-CCNC: 44 U/L (ref 0–50)
ANION GAP SERPL CALCULATED.3IONS-SCNC: 13 MMOL/L (ref 7–15)
APPEARANCE UR: CLEAR
AST SERPL W P-5'-P-CCNC: 22 U/L (ref 0–45)
BASOPHILS # BLD AUTO: 0 10E3/UL (ref 0–0.2)
BASOPHILS NFR BLD AUTO: 1 %
BILIRUB SERPL-MCNC: 0.8 MG/DL
BILIRUB UR QL STRIP: NEGATIVE
BUN SERPL-MCNC: 12.3 MG/DL (ref 8–23)
CA-I BLD-MCNC: 4 MG/DL (ref 4.4–5.2)
CALCIUM SERPL-MCNC: 8.2 MG/DL (ref 8.8–10.2)
CHLORIDE SERPL-SCNC: 102 MMOL/L (ref 98–107)
COLOR UR AUTO: NORMAL
CREAT SERPL-MCNC: 0.66 MG/DL (ref 0.51–0.95)
DEPRECATED HCO3 PLAS-SCNC: 29 MMOL/L (ref 22–29)
EGFRCR SERPLBLD CKD-EPI 2021: >90 ML/MIN/1.73M2
EOSINOPHIL # BLD AUTO: 0.1 10E3/UL (ref 0–0.7)
EOSINOPHIL NFR BLD AUTO: 2 %
ERYTHROCYTE [DISTWIDTH] IN BLOOD BY AUTOMATED COUNT: 13.8 % (ref 10–15)
GLUCOSE SERPL-MCNC: 99 MG/DL (ref 70–99)
GLUCOSE UR STRIP-MCNC: NEGATIVE MG/DL
HCT VFR BLD AUTO: 37.1 % (ref 35–47)
HGB BLD-MCNC: 12.4 G/DL (ref 11.7–15.7)
HGB UR QL STRIP: NEGATIVE
IMM GRANULOCYTES # BLD: 0 10E3/UL
IMM GRANULOCYTES NFR BLD: 1 %
KETONES UR STRIP-MCNC: NEGATIVE MG/DL
LEUKOCYTE ESTERASE UR QL STRIP: NEGATIVE
LYMPHOCYTES # BLD AUTO: 2.1 10E3/UL (ref 0.8–5.3)
LYMPHOCYTES NFR BLD AUTO: 29 %
MAGNESIUM SERPL-MCNC: 1.5 MG/DL (ref 1.7–2.3)
MCH RBC QN AUTO: 29.5 PG (ref 26.5–33)
MCHC RBC AUTO-ENTMCNC: 33.4 G/DL (ref 31.5–36.5)
MCV RBC AUTO: 88 FL (ref 78–100)
MONOCYTES # BLD AUTO: 0.8 10E3/UL (ref 0–1.3)
MONOCYTES NFR BLD AUTO: 11 %
NEUTROPHILS # BLD AUTO: 4.2 10E3/UL (ref 1.6–8.3)
NEUTROPHILS NFR BLD AUTO: 56 %
NITRATE UR QL: NEGATIVE
NRBC # BLD AUTO: 0 10E3/UL
NRBC BLD AUTO-RTO: 0 /100
PH UR STRIP: 7 [PH] (ref 5–7)
PHOSPHATE SERPL-MCNC: 4.5 MG/DL (ref 2.5–4.5)
PLATELET # BLD AUTO: 340 10E3/UL (ref 150–450)
POTASSIUM SERPL-SCNC: 3.6 MMOL/L (ref 3.4–5.3)
PROT SERPL-MCNC: 7.2 G/DL (ref 6.4–8.3)
RBC # BLD AUTO: 4.2 10E6/UL (ref 3.8–5.2)
RBC URINE: <1 /HPF
SODIUM SERPL-SCNC: 144 MMOL/L (ref 135–145)
SP GR UR STRIP: 1 (ref 1–1.03)
UROBILINOGEN UR STRIP-MCNC: NORMAL MG/DL
WBC # BLD AUTO: 7.3 10E3/UL (ref 4–11)
WBC URINE: 1 /HPF

## 2024-02-10 PROCEDURE — 83735 ASSAY OF MAGNESIUM: CPT | Performed by: EMERGENCY MEDICINE

## 2024-02-10 PROCEDURE — 81001 URINALYSIS AUTO W/SCOPE: CPT | Performed by: EMERGENCY MEDICINE

## 2024-02-10 PROCEDURE — 36415 COLL VENOUS BLD VENIPUNCTURE: CPT | Performed by: EMERGENCY MEDICINE

## 2024-02-10 PROCEDURE — 80053 COMPREHEN METABOLIC PANEL: CPT | Performed by: EMERGENCY MEDICINE

## 2024-02-10 PROCEDURE — 99285 EMERGENCY DEPT VISIT HI MDM: CPT | Mod: 25

## 2024-02-10 PROCEDURE — 82330 ASSAY OF CALCIUM: CPT | Performed by: EMERGENCY MEDICINE

## 2024-02-10 PROCEDURE — 250N000009 HC RX 250: Performed by: EMERGENCY MEDICINE

## 2024-02-10 PROCEDURE — 85025 COMPLETE CBC W/AUTO DIFF WBC: CPT | Performed by: EMERGENCY MEDICINE

## 2024-02-10 PROCEDURE — 93005 ELECTROCARDIOGRAM TRACING: CPT

## 2024-02-10 PROCEDURE — 84100 ASSAY OF PHOSPHORUS: CPT | Performed by: EMERGENCY MEDICINE

## 2024-02-10 RX ORDER — CALCIUM GLUCONATE 94 MG/ML
1 INJECTION, SOLUTION INTRAVENOUS ONCE
Status: DISCONTINUED | OUTPATIENT
Start: 2024-02-10 | End: 2024-02-11

## 2024-02-10 RX ORDER — LIDOCAINE 40 MG/G
CREAM TOPICAL
Status: DISCONTINUED | OUTPATIENT
Start: 2024-02-10 | End: 2024-02-13 | Stop reason: HOSPADM

## 2024-02-10 RX ADMIN — LIDOCAINE: 40 CREAM TOPICAL at 22:53

## 2024-02-10 ASSESSMENT — ACTIVITIES OF DAILY LIVING (ADL): ADLS_ACUITY_SCORE: 38

## 2024-02-11 PROBLEM — E83.51 HYPOCALCEMIA: Status: ACTIVE | Noted: 2024-02-11

## 2024-02-11 LAB
ALBUMIN SERPL BCG-MCNC: 4.2 G/DL (ref 3.5–5.2)
ALP SERPL-CCNC: 155 U/L (ref 40–150)
ALT SERPL W P-5'-P-CCNC: 38 U/L (ref 0–50)
ANION GAP SERPL CALCULATED.3IONS-SCNC: 13 MMOL/L (ref 7–15)
AST SERPL W P-5'-P-CCNC: 22 U/L (ref 0–45)
BILIRUB SERPL-MCNC: 1.1 MG/DL
BUN SERPL-MCNC: 10.6 MG/DL (ref 8–23)
CA-I BLD-MCNC: 3.9 MG/DL (ref 4.4–5.2)
CA-I BLD-MCNC: 4.2 MG/DL (ref 4.4–5.2)
CALCIUM SERPL-MCNC: 7.8 MG/DL (ref 8.8–10.2)
CALCIUM SERPL-MCNC: 7.8 MG/DL (ref 8.8–10.2)
CALCIUM SERPL-MCNC: 8.3 MG/DL (ref 8.8–10.2)
CHLORIDE SERPL-SCNC: 100 MMOL/L (ref 98–107)
CREAT SERPL-MCNC: 0.59 MG/DL (ref 0.51–0.95)
DEPRECATED HCO3 PLAS-SCNC: 28 MMOL/L (ref 22–29)
EGFRCR SERPLBLD CKD-EPI 2021: >90 ML/MIN/1.73M2
ERYTHROCYTE [DISTWIDTH] IN BLOOD BY AUTOMATED COUNT: 13.7 % (ref 10–15)
GLUCOSE SERPL-MCNC: 128 MG/DL (ref 70–99)
HCT VFR BLD AUTO: 36.4 % (ref 35–47)
HGB BLD-MCNC: 12.2 G/DL (ref 11.7–15.7)
MAGNESIUM SERPL-MCNC: 1.7 MG/DL (ref 1.7–2.3)
MAGNESIUM SERPL-MCNC: 2.3 MG/DL (ref 1.7–2.3)
MCH RBC QN AUTO: 30 PG (ref 26.5–33)
MCHC RBC AUTO-ENTMCNC: 33.5 G/DL (ref 31.5–36.5)
MCV RBC AUTO: 90 FL (ref 78–100)
PHOSPHATE SERPL-MCNC: 5.2 MG/DL (ref 2.5–4.5)
PLATELET # BLD AUTO: 307 10E3/UL (ref 150–450)
POTASSIUM SERPL-SCNC: 3.6 MMOL/L (ref 3.4–5.3)
PROT SERPL-MCNC: 6.6 G/DL (ref 6.4–8.3)
RBC # BLD AUTO: 4.06 10E6/UL (ref 3.8–5.2)
SODIUM SERPL-SCNC: 141 MMOL/L (ref 135–145)
WBC # BLD AUTO: 5.9 10E3/UL (ref 4–11)

## 2024-02-11 PROCEDURE — 80053 COMPREHEN METABOLIC PANEL: CPT | Performed by: EMERGENCY MEDICINE

## 2024-02-11 PROCEDURE — 83735 ASSAY OF MAGNESIUM: CPT | Performed by: STUDENT IN AN ORGANIZED HEALTH CARE EDUCATION/TRAINING PROGRAM

## 2024-02-11 PROCEDURE — 82330 ASSAY OF CALCIUM: CPT | Performed by: STUDENT IN AN ORGANIZED HEALTH CARE EDUCATION/TRAINING PROGRAM

## 2024-02-11 PROCEDURE — 250N000013 HC RX MED GY IP 250 OP 250 PS 637: Performed by: STUDENT IN AN ORGANIZED HEALTH CARE EDUCATION/TRAINING PROGRAM

## 2024-02-11 PROCEDURE — 250N000011 HC RX IP 250 OP 636: Performed by: HOSPITALIST

## 2024-02-11 PROCEDURE — 82310 ASSAY OF CALCIUM: CPT | Performed by: STUDENT IN AN ORGANIZED HEALTH CARE EDUCATION/TRAINING PROGRAM

## 2024-02-11 PROCEDURE — G0378 HOSPITAL OBSERVATION PER HR: HCPCS

## 2024-02-11 PROCEDURE — 36415 COLL VENOUS BLD VENIPUNCTURE: CPT | Performed by: EMERGENCY MEDICINE

## 2024-02-11 PROCEDURE — 99418 PROLNG IP/OBS E/M EA 15 MIN: CPT | Performed by: HOSPITALIST

## 2024-02-11 PROCEDURE — 250N000013 HC RX MED GY IP 250 OP 250 PS 637: Performed by: HOSPITALIST

## 2024-02-11 PROCEDURE — 250N000011 HC RX IP 250 OP 636: Performed by: STUDENT IN AN ORGANIZED HEALTH CARE EDUCATION/TRAINING PROGRAM

## 2024-02-11 PROCEDURE — 96365 THER/PROPH/DIAG IV INF INIT: CPT

## 2024-02-11 PROCEDURE — 83735 ASSAY OF MAGNESIUM: CPT | Performed by: EMERGENCY MEDICINE

## 2024-02-11 PROCEDURE — 250N000011 HC RX IP 250 OP 636: Performed by: EMERGENCY MEDICINE

## 2024-02-11 PROCEDURE — 99223 1ST HOSP IP/OBS HIGH 75: CPT | Performed by: HOSPITALIST

## 2024-02-11 PROCEDURE — 85027 COMPLETE CBC AUTOMATED: CPT | Performed by: STUDENT IN AN ORGANIZED HEALTH CARE EDUCATION/TRAINING PROGRAM

## 2024-02-11 PROCEDURE — 36415 COLL VENOUS BLD VENIPUNCTURE: CPT | Performed by: STUDENT IN AN ORGANIZED HEALTH CARE EDUCATION/TRAINING PROGRAM

## 2024-02-11 PROCEDURE — 99207 PR APP CREDIT; MD BILLING SHARED VISIT: CPT | Performed by: STUDENT IN AN ORGANIZED HEALTH CARE EDUCATION/TRAINING PROGRAM

## 2024-02-11 PROCEDURE — 84100 ASSAY OF PHOSPHORUS: CPT | Performed by: STUDENT IN AN ORGANIZED HEALTH CARE EDUCATION/TRAINING PROGRAM

## 2024-02-11 PROCEDURE — 96376 TX/PRO/DX INJ SAME DRUG ADON: CPT

## 2024-02-11 PROCEDURE — 96375 TX/PRO/DX INJ NEW DRUG ADDON: CPT

## 2024-02-11 RX ORDER — NALOXONE HYDROCHLORIDE 0.4 MG/ML
0.4 INJECTION, SOLUTION INTRAMUSCULAR; INTRAVENOUS; SUBCUTANEOUS
Status: DISCONTINUED | OUTPATIENT
Start: 2024-02-11 | End: 2024-02-13 | Stop reason: HOSPADM

## 2024-02-11 RX ORDER — VALACYCLOVIR HYDROCHLORIDE 1 G/1
1000 TABLET, FILM COATED ORAL DAILY PRN
COMMUNITY

## 2024-02-11 RX ORDER — ONDANSETRON 2 MG/ML
4 INJECTION INTRAMUSCULAR; INTRAVENOUS EVERY 6 HOURS PRN
Status: DISCONTINUED | OUTPATIENT
Start: 2024-02-11 | End: 2024-02-13 | Stop reason: HOSPADM

## 2024-02-11 RX ORDER — LISINOPRIL 20 MG/1
20 TABLET ORAL DAILY
Status: DISCONTINUED | OUTPATIENT
Start: 2024-02-11 | End: 2024-02-13 | Stop reason: HOSPADM

## 2024-02-11 RX ORDER — CALCIUM CARBONATE 500(1250)
2 TABLET ORAL DAILY
Status: DISCONTINUED | OUTPATIENT
Start: 2024-02-14 | End: 2024-02-12 | Stop reason: ALTCHOICE

## 2024-02-11 RX ORDER — CALCIUM CARBONATE 500(1250)
2 TABLET ORAL 2 TIMES DAILY
Status: DISCONTINUED | OUTPATIENT
Start: 2024-02-11 | End: 2024-02-12

## 2024-02-11 RX ORDER — AMOXICILLIN 250 MG
1-2 CAPSULE ORAL 2 TIMES DAILY
Status: DISCONTINUED | OUTPATIENT
Start: 2024-02-11 | End: 2024-02-13 | Stop reason: HOSPADM

## 2024-02-11 RX ORDER — MAGNESIUM SULFATE HEPTAHYDRATE 40 MG/ML
2 INJECTION, SOLUTION INTRAVENOUS ONCE
Status: COMPLETED | OUTPATIENT
Start: 2024-02-11 | End: 2024-02-11

## 2024-02-11 RX ORDER — OXYCODONE HYDROCHLORIDE 5 MG/1
2.5 TABLET ORAL AT BEDTIME
COMMUNITY

## 2024-02-11 RX ORDER — NALOXONE HYDROCHLORIDE 0.4 MG/ML
0.2 INJECTION, SOLUTION INTRAMUSCULAR; INTRAVENOUS; SUBCUTANEOUS
Status: DISCONTINUED | OUTPATIENT
Start: 2024-02-11 | End: 2024-02-13 | Stop reason: HOSPADM

## 2024-02-11 RX ORDER — ACETAMINOPHEN 325 MG/1
650 TABLET ORAL EVERY 4 HOURS PRN
Status: DISCONTINUED | OUTPATIENT
Start: 2024-02-11 | End: 2024-02-13 | Stop reason: HOSPADM

## 2024-02-11 RX ORDER — CALCIUM GLUCONATE 20 MG/ML
1 INJECTION, SOLUTION INTRAVENOUS ONCE
Status: COMPLETED | OUTPATIENT
Start: 2024-02-11 | End: 2024-02-11

## 2024-02-11 RX ORDER — CALCITRIOL 0.25 UG/1
0.5 CAPSULE, LIQUID FILLED ORAL 3 TIMES DAILY
Status: DISCONTINUED | OUTPATIENT
Start: 2024-02-11 | End: 2024-02-12

## 2024-02-11 RX ORDER — ONDANSETRON 4 MG/1
4 TABLET, ORALLY DISINTEGRATING ORAL EVERY 6 HOURS PRN
Status: DISCONTINUED | OUTPATIENT
Start: 2024-02-11 | End: 2024-02-13 | Stop reason: HOSPADM

## 2024-02-11 RX ORDER — OXYCODONE HYDROCHLORIDE 5 MG/1
5-10 TABLET ORAL EVERY 4 HOURS PRN
Status: DISCONTINUED | OUTPATIENT
Start: 2024-02-11 | End: 2024-02-13 | Stop reason: HOSPADM

## 2024-02-11 RX ADMIN — ACETAMINOPHEN 650 MG: 325 TABLET, FILM COATED ORAL at 16:24

## 2024-02-11 RX ADMIN — CALCIUM 1000 MG: 500 TABLET ORAL at 09:44

## 2024-02-11 RX ADMIN — MAGNESIUM SULFATE HEPTAHYDRATE 2 G: 2 INJECTION, SOLUTION INTRAVENOUS at 00:50

## 2024-02-11 RX ADMIN — CALCITRIOL CAPSULES 0.25 MCG 0.5 MCG: 0.25 CAPSULE ORAL at 09:44

## 2024-02-11 RX ADMIN — CALCITRIOL CAPSULES 0.25 MCG 0.5 MCG: 0.25 CAPSULE ORAL at 20:52

## 2024-02-11 RX ADMIN — ACETAMINOPHEN 650 MG: 325 TABLET, FILM COATED ORAL at 09:54

## 2024-02-11 RX ADMIN — ONDANSETRON 4 MG: 4 TABLET, ORALLY DISINTEGRATING ORAL at 10:26

## 2024-02-11 RX ADMIN — CALCIUM GLUCONATE 1 G: 20 INJECTION, SOLUTION INTRAVENOUS at 00:32

## 2024-02-11 RX ADMIN — CALCIUM GLUCONATE 1 G: 20 INJECTION, SOLUTION INTRAVENOUS at 11:58

## 2024-02-11 RX ADMIN — CALCITRIOL CAPSULES 0.25 MCG 0.5 MCG: 0.25 CAPSULE ORAL at 14:11

## 2024-02-11 RX ADMIN — LISINOPRIL 20 MG: 20 TABLET ORAL at 10:26

## 2024-02-11 RX ADMIN — CALCIUM 1000 MG: 500 TABLET ORAL at 20:52

## 2024-02-11 ASSESSMENT — ACTIVITIES OF DAILY LIVING (ADL)
ADLS_ACUITY_SCORE: 38
ADLS_ACUITY_SCORE: 23
ADLS_ACUITY_SCORE: 38
ADLS_ACUITY_SCORE: 38
ADLS_ACUITY_SCORE: 23
ADLS_ACUITY_SCORE: 23
ADLS_ACUITY_SCORE: 38
ADLS_ACUITY_SCORE: 23

## 2024-02-11 NOTE — PLAN OF CARE
PRIMARY DIAGNOSIS: Hypocalcemia   OUTPATIENT/OBSERVATION GOALS TO BE MET BEFORE DISCHARGE:  ADLs back to baseline: Yes    Activity and level of assistance: Ax1 to bedside commode    Pain status: Improved-controlled with oral pain medications.    Return to near baseline physical activity: Yes     Discharge Planner Nurse   Safe discharge environment identified: Yes  Barriers to discharge: Yes       Entered by: Makenna Benson RN 02/11/2024 5:44 PM     Please review provider order for any additional goals.   Nurse to notify provider when observation goals have been met and patient is ready for discharge.    A&Ox4, @1630 patient reported tingling around her mouth and hands, family at the bedside, boot on R foot, Ax1 with walker to bathroom, ca 7.8- calcium gluconate infusing, plan to recheck this evening 1700.

## 2024-02-11 NOTE — ED PROVIDER NOTES
History     Chief Complaint:  Numbness and Nausea       The history is provided by the patient.      Nila Viveros is a 68 year old female with history of parathyroidectomy on 2/7/24 who presents to the ED for evaluation of numbness and nausea. The patient reports experiencing a tingling sensation around her nose that started two nights ago. Yesterday, the tingling feeling was localized in the face and hands. The patient called endocrinology, who instructed the patient to double her dose of calcium, but this did not resolve the tingling. She also doubled her calcium dose today. Today, the patient notes that she is experiencing tingling in her face, chest, bilateral arms to elbows and bilateral legs up to knees. The patient endorses four episodes of diarrhea per day for the last 2 days, nausea which has been resolved with Zofran taken prior to ED arrival, dry mouth, and feeling unsteady today. The patient denies vomiting, bloody stool, cold symptoms, cough, rhinorrhea, other pain, dysphagia, or difficulty breathing. Of note, the patient mentions taking two 625 Tylenol and 2.5 mg of oxycodone for these symptoms.    Independent Historian:   None - Patient Only    Review of External Notes:   I reviewed her recent calcium levels in Chart Review.    Component      Latest Ref Rng 2/7/2024  6:10 PM 2/8/2024  7:13 AM   Calcium      8.8 - 10.2 mg/dL 10.1  9.2         Medications:    Valtrex  Zestril  Oxycodone  Rocaltrol  Calcium carbonate  Lodine    Past Medical History:    Hypertension  Degenerative arthritis of bilateral knees  Giardia  Right knee mass    Past Surgical History:    Parathyroidectomy  Right TKA  Right knee procedure  LEEP procedure  Tubal ligation  Vein stripping    Physical Exam   Patient Vitals for the past 24 hrs:   BP Temp Temp src Pulse Resp SpO2   02/11/24 0108 -- -- -- 63 14 96 %   02/11/24 0100 -- -- -- 64 -- 96 %   02/11/24 0058 (!) 145/86 -- -- 75 -- 96 %   02/11/24 0045 (!) 150/86 -- -- 64  12 95 %   02/11/24 0030 (!) 142/105 -- -- 69 28 --   02/11/24 0015 (!) 170/102 -- -- 73 21 --   02/11/24 0000 (!) 162/93 -- -- 67 -- 96 %   02/10/24 2345 (!) 159/96 -- -- 70 -- 97 %   02/10/24 2300 (!) 180/99 -- -- 70 10 96 %   02/10/24 2243 -- -- -- -- -- 94 %   02/10/24 2220 (!) 178/104 98.7  F (37.1  C) Temporal 91 18 95 %     Physical Exam  General: The patient is alert, in no respiratory distress.    HENT: Mucous membranes moist.    Cardiovascular: Regular rate and rhythm. Good pulses in all four extremities. Normal capillary refill and skin turgor.     Respiratory: Lungs are clear. No nasal flaring. No retractions. No wheezing, no crackles.    Gastrointestinal: Abdomen soft. No guarding, no rebound. No palpable hernias.     Musculoskeletal: No gross deformity.     Skin: No rashes or petechiae.     Neurologic: The patient is alert and oriented x3. GCS 15. No testable cranial nerve deficit. Follows commands with clear and appropriate speech. Gives appropriate answers. Good strength in all extremities. No gross neurologic deficit. Gross sensation intact. Pupils are round and reactive. No meningismus.     Lymphatic: No cervical adenopathy. No lower extremity swelling.    Psychiatric: The patient is non-tearful.     Endo: With inflation of blood pressure cuff, no spasm.    Emergency Department Course   ECG  ECG taken at 2238, ECG read at 2239  Normal sinus rhythm  Rate 77 bpm. SD interval 140 ms. QRS duration 82 ms. QT/QTc 414/468 ms. P-R-T axes 20 7 40.     Laboratory:  Labs Ordered and Resulted from Time of ED Arrival to Time of ED Departure   COMPREHENSIVE METABOLIC PANEL - Abnormal       Result Value    Sodium 144      Potassium 3.6      Carbon Dioxide (CO2) 29      Anion Gap 13      Urea Nitrogen 12.3      Creatinine 0.66      GFR Estimate >90      Calcium 8.2 (*)     Chloride 102      Glucose 99      Alkaline Phosphatase 164 (*)     AST 22      ALT 44      Protein Total 7.2      Albumin 4.3      Bilirubin  Total 0.8     MAGNESIUM - Abnormal    Magnesium 1.5 (*)    IONIZED CALCIUM - Abnormal    Calcium Ionized Whole Blood 4.0 (*)    PHOSPHORUS - Normal    Phosphorus 4.5     ROUTINE UA WITH MICROSCOPIC - Normal    Color Urine Straw      Appearance Urine Clear      Glucose Urine Negative      Bilirubin Urine Negative      Ketones Urine Negative      Specific Gravity Urine 1.005      Blood Urine Negative      pH Urine 7.0      Protein Albumin Urine Negative      Urobilinogen Urine Normal      Nitrite Urine Negative      Leukocyte Esterase Urine Negative      RBC Urine <1      WBC Urine 1     CBC WITH PLATELETS AND DIFFERENTIAL    WBC Count 7.3      RBC Count 4.20      Hemoglobin 12.4      Hematocrit 37.1      MCV 88      MCH 29.5      MCHC 33.4      RDW 13.8      Platelet Count 340      % Neutrophils 56      % Lymphocytes 29      % Monocytes 11      % Eosinophils 2      % Basophils 1      % Immature Granulocytes 1      NRBCs per 100 WBC 0      Absolute Neutrophils 4.2      Absolute Lymphocytes 2.1      Absolute Monocytes 0.8      Absolute Eosinophils 0.1      Absolute Basophils 0.0      Absolute Immature Granulocytes 0.0      Absolute NRBCs 0.0     CALCIUM   MAGNESIUM        Procedures   None    Emergency Department Course & Assessments:    Interventions:  Medications   lidocaine 1 % 0.1-1 mL (has no administration in time range)   lidocaine (LMX4) cream ( Topical $Given 2/10/24 2253)   sodium chloride (PF) 0.9% PF flush 3 mL (has no administration in time range)   sodium chloride (PF) 0.9% PF flush 3 mL (has no administration in time range)   magnesium sulfate 2 g in 50 mL sterile water intermittent infusion (2 g Intravenous $New Bag 2/11/24 0050)   calcium gluconate 1 g in 50 mL in sodium chloride intermittent infusion (1 g Intravenous $Given 2/11/24 0032)      Assessments:  223 I obtained history and examined the patient as noted above.  0013 I rechecked the patient and explained findings    Independent Interpretation  (X-rays, CTs, rhythm strip):  None    Consultations/Discussion of Management or Tests:  0054 I spoke with Dr. Shen,endocrinology, regarding the patient's history and presentation to the ED tonight  0156 I spoke with Dr. Carl, hospitalist, regarding the patient's admission to [facility]       Social Determinants of Health affecting care:   None    Disposition:  The patient was admitted to the hospital under the care of Dr. Carl.     Impression & Plan    CMS Diagnoses: None    Medical Decision Making:  While there are many reasons the patient may have paresthesias across her face and her arms I felt that most likely status post parathyroidectomy this is likely hypothalassemia.  The patient has been increasing her dose of calcium orally working with her doctors and that might be the reason why her calcium is not particularly low.  I was concerned about seizures considered arrhythmias.  I did hold on giving her calcium until the calcium came back low ionized calcium was checked her magnesium was supplemented.  I did consider hyperventilation as a cause other neurologic conditions but felt that this picture is most likely hypocalcemia I discussed the case with endocrinology who recommended continued calcium supplementation and the patient was admitted to the hospital in improving condition.    Diagnosis:    ICD-10-CM    1. Hypocalcemia  E83.51       2. Hypomagnesemia  E83.42              Scribe Disclosure:  IAnai Hailie, am serving as a scribe at 12:08 AM on 2/11/2024 to document services personally performed by Jose Soria MD based on my observations and the provider's statements to me.     Scribe Disclosure:  IBuck Mumtaz, am serving as a scribe at 12:08 AM on 2/11/2024 to document services personally performed by Jose Soria MD based on my observations and the provider's statements to me.   2/10/2024   Jose Soria MD Farnan, Christopher M, MD  02/11/24 0604

## 2024-02-11 NOTE — TELEPHONE ENCOUNTER
Surgery telephone call:     Paged this evening and discussed with her daughter Rowan. The patient had subtotal parathyroidectomy a couple days ago. She has severe numbness and tingling throughout her body that has been getting progressively worse despite increasing her oral calcium dose. I recommended coming into the ED to check calcium level and possible/likely admission for IV calcium. She understood this well and will come to ED today.     Rohan Wilson MD

## 2024-02-11 NOTE — PHARMACY-ADMISSION MEDICATION HISTORY
Pharmacist Admission Medication History    Admission medication history is complete. The information provided in this note is only as accurate as the sources available at the time of the update.    Information Source(s): Patient and CareEverywhere/SureScripts via in-person    Changes made to PTA medication list:  Added: valacyclovir  Deleted: acetaminophen 325 mg, etodolac, hydromorphone  Changed: oxycodone    Allergies reviewed with patient and updates made in EHR: yes    Medication History Completed By: Marielle Bradley, PharmD 2/11/2024 9:48 AM    Prior to Admission medications    Medication Sig Last Dose Taking? Auth Provider Long Term End Date   acetaminophen (TYLENOL) 500 MG tablet Take 2 tablets (1,000 mg) by mouth every 6 hours as needed for other (mild pain) 2/10/2024 Yes Ally Briggs MD     calcitRIOL (ROCALTROL) 0.5 MCG capsule Take 1 capsule (0.5 mcg) by mouth 2 times daily for 30 days 2/10/2024 at pm Yes Christine Maurer PA-C Yes 3/9/24   calcium carbonate (OS-JARRETT) 500 MG tablet Take 2 tablets (1,000 mg) by mouth 2 times daily for 7 days, THEN 2 tablets (1,000 mg) daily for 7 days. 2/10/2024 at pm Yes Ally Briggs MD  2/20/24   cholecalciferol (VITAMIN D3) 125 mcg (5000 units) capsule Take 125 mcg by mouth daily 2/10/2024 Yes Reported, Patient     EPINEPHrine (ANY BX GENERIC EQUIV) 0.3 MG/0.3ML injection 2-pack Inject 0.3 mg into the muscle as needed for anaphylaxis May repeat one time in 5-15 minutes if response to initial dose is inadequate.  Yes Reported, Patient     FLUTICASONE PROPIONATE, NASAL, NA Spray 1 spray in nostril daily as needed Past Week Yes Reported, Patient     lisinopril (ZESTRIL) 20 MG tablet Take 1 tablet by mouth daily 2/10/2024 Yes Reported, Patient Yes    melatonin 3 MG tablet Take 3 mg by mouth nightly as needed for sleep Past Week Yes Reported, Patient     Misc Natural Products (OSTEO BI-FLEX ADV JOINT SHIELD) TABS Take 1,500 mg by mouth daily 2/10/2024 Yes  Reported, Patient     ondansetron (ZOFRAN) 4 MG tablet Take 1 tablet (4 mg) by mouth every 8 hours as needed for nausea 2/10/2024 Yes Lauren Shine APRN CNP No    oxyCODONE (ROXICODONE) 5 MG tablet Take 2.5 mg by mouth at bedtime 2/10/2024 Yes Unknown, Entered By History     Probiotic Product (PROBIOTIC DAILY) CAPS Take 1 capsule by mouth daily 2/10/2024 Yes Reported, Patient     senna-docusate (SENOKOT-S/PERICOLACE) 8.6-50 MG tablet Take 1-2 tablets by mouth 2 times daily Take while on oral narcotics to prevent or treat constipation. Past Week Yes Ally Briggs MD     Ubiquinol 100 MG CAPS Take 100 mg by mouth daily 2/10/2024 Yes Reported, Patient     valACYclovir (VALTREX) 1000 mg tablet Take 1,000 mg by mouth daily as needed (cold sores) Past Month Yes Unknown, Entered By History Yes

## 2024-02-11 NOTE — PROGRESS NOTES
[facility]    Medicine Progress Note - Hospitalist Service    Date of Admission:  2/10/2024    Assessment & Plan   Nila Viveros is a 68 year old female w/PMH R leg fracture, primary hyperparathyroidism s/p exision L superior parathyroid adenoma, who was admitted for observation on 2/7/2024 with parasthesias and found to have low calcium, mag     primary hyperparathyroidism s/p excision L superior parathyroid adenoma   Hypocalcemia, hypomag  Had surgery with Dr. Briggs 2/7/24 with above findings of adenoma, had persistently elevated PTH during surgery, so 2 of 4 glands were excised entirely and a 3rd one was biopsied. Did well initially but  for two days prior to presentation, developed parasthesias despite being on calcitriol and calcium carbonate. Provider directed her to increase calcitriol to TID but symptoms worsened and presented to ED.  -in ED had Ca 8.2 and ionized Ca 4, mag 1.5   -repleted with IV calcium gluconate and IV mag with significant symptom improvement  -had return of symptoms 2/11 AM with repeat ionized Ca 3.9   - ordered another dose of IV calcium gluconate   - repeat ionized Ca at 1700   -cont recently adjusted dosing of calcitriol TID and calcium gluconate     R leg fracture -in boot, outpt follow-up. Resumed home oxy   HTN - PTA lisinopril  Arthritis - PRN tylenol       Observation Goals: -diagnostic tests and consults completed and resulted, -vital signs normal or at patient baseline, Nurse to notify provider when observation goals have been met and patient is ready for discharge.  Diet: Regular Diet Adult    DVT Prophylaxis: Pneumatic Compression Devices  Lau Catheter: Not present  Lines: None     Cardiac Monitoring: ACTIVE order. Indication: Electrolyte Imbalance (24 hours)- Magnesium <1.3 mg/ml; Potassium < =2.8 or > 5.5 mg/ml  Code Status: Full Code      Clinically Significant Risk Factors Present on Admission          # Hypocalcemia: Lowest Ca = 8.2 mg/dL in  "last 2 days, will monitor and replace as appropriate   # Hypomagnesemia: Lowest Mg = 1.5 mg/dL in last 2 days, will replace as needed       # Hypertension: Home medication list includes antihypertensive(s)      # Overweight: Estimated body mass index is 26.61 kg/m  as calculated from the following:    Height as of 2/7/24: 1.626 m (5' 4\").    Weight as of 2/7/24: 70.3 kg (155 lb).              Disposition Plan      Expected Discharge Date: 02/12/2024      Destination: home with family              Akiok Day, DO  Hospitalist Service  [facility]  Securely message with [employer] (more info)  Text page via [employer] Paging/Directory   ______________________________________________________________________    Interval History   No acute overnight events.  Had recurrence of tingling. Ca again low. Ordered IV replacement.     Physical Exam   Vital Signs: Temp: 98.2  F (36.8  C) Temp src: Oral BP: 133/84 Pulse: 74   Resp: 16 SpO2: 96 % O2 Device: None (Room air)    Weight: 0 lbs 0 oz    Constitutional: Awake, alert, no distress, and cooperative  Cardiovascular: Regular rate and rhythm, normal S1 and S2, no S3 or S4, and no murmur noted  Respiratory: No increased work of breathing, good air exchange, clear to auscultation bilaterally, no crackles or wheezing  Gastrointestinal: Abdomen soft, non-tender, non-distended. BS normal. No masses, organomegaly     Medical Decision Making       45 MINUTES SPENT BY ME on the date of service doing chart review, history, exam, documentation & further activities per the note.      Data     I have personally reviewed the following data over the past 24 hrs:    5.9  \   12.2   / 307     141 100 10.6 /  128 (H)   3.6 28 0.59 \     ALT: 38 AST: 22 AP: 155 (H) TBILI: 1.1   ALB: 4.2 TOT PROTEIN: 6.6 LIPASE: N/A       "

## 2024-02-11 NOTE — PLAN OF CARE
PRIMARY DIAGNOSIS: Hypocalcemia   OUTPATIENT/OBSERVATION GOALS TO BE MET BEFORE DISCHARGE:  ADLs back to baseline: Yes    Activity and level of assistance: Ax1 to bedside commode    Pain status: Improved-controlled with oral pain medications.    Return to near baseline physical activity: Yes     Discharge Planner Nurse   Safe discharge environment identified: Yes  Barriers to discharge: Yes       Entered by: Makenna Benson RN 02/11/2024 11:03 AM     Please review provider order for any additional goals.   Nurse to notify provider when observation goals have been met and patient is ready for discharge.    A&Ox4, patient reports tingling around face, hands, and feet-provider notified, labs ordered, family at the bedside, boot on R foot, pivot to bedside commode

## 2024-02-11 NOTE — ED NOTES
Pt experiencing numbness/tingling on face neck, arms, and thighs. Told to watch for these sx following parathyroidectomy. No relief in sx after doubling calcium dose.

## 2024-02-11 NOTE — PLAN OF CARE
ROOM # 204-2    Living Situation (if not independent, order SW consult): Home   Facility name:  : Rowan -daughter ( [phone])    Activity level at baseline:Independent  Activity level on admit: SBA with GB/walker    Who will be transporting you at discharge:Rowan    Patient registered to observation; given Patient Bill of Rights; given the opportunity to ask questions about observation status and their plan of care.  Patient has been oriented to the observation room, bathroom and call light is in place.    Discussed discharge goals and expectations with patient/family.

## 2024-02-11 NOTE — ED TRIAGE NOTES
Presents to triage with c/o numbness/tingling to bilateral legs arms and face and nausea. Patient had a parathyroidectomy on Wednesday and began having numbness and tingling on Friday. Patient called on call endocrinology and they instructed her to double her calcium dose which patient did yesterday and today. Today, numbness tingling became significantly worse and patient became very nauseated. Endocrinology instructed patient to be seen in the ED. Zofran at 2130

## 2024-02-11 NOTE — ED NOTES
[facility]  ED Nurse Handoff Report    ED Chief complaint: Numbness and Nausea  . ED Diagnosis:   Final diagnoses:   Hypocalcemia   Hypomagnesemia       Allergies:   Allergies   Allergen Reactions    Azithromycin Hives    Bee Venom Hives    Zinc Nausea    Nickel Rash    Silver Rash       Code Status: Full Code    Activity level - Baseline/Home:  independent.  Activity Level - Current:   assist of 2.   Lift room needed: No.   Bariatric: No   Needed: No   Isolation: No.   Infection: Not Applicable.     Respiratory status: Room air    Vital Signs (within 30 minutes):   Vitals:    02/11/24 0045 02/11/24 0058 02/11/24 0100 02/11/24 0108   BP: (!) 150/86 (!) 145/86     Pulse: 64 75 64 63   Resp: 12   14   Temp:       TempSrc:       SpO2: 95% 96% 96% 96%       Cardiac Rhythm:  ,      Pain level:    Patient confused: No.   Patient Falls Risk: nonskid shoes/slippers when out of bed, patient and family education, and assistive device/personal items within reach.   Elimination Status: Has voided     Patient Report - Initial Complaint: numbness, nausea.   Nila Viveros is a 68 year old female with history of parathyroidectomy on 2/7/24 who presents to the ED for evaluation of numbness and nausea. The patient reports experiencing a tingling sensation around her nose that started two nights ago. Yesterday, the tingling feeling was localized in the face and hands. The patient called endocrinology, who instructed the patient to double her dose of calcium, but this did not resolve the tingling. She also doubled her calcium dose today. Today, the patient notes that she is experiencing tingling in her face, chest, bilateral arms to elbows and bilateral legs up to knees. The patient endorses four episodes of diarrhea per day for the last 2 days, nausea which has been resolved with Zofran taken prior to ED arrival, dry mouth, and feeling unsteady today. The patient denies vomiting, bloody stool,  cold symptoms, cough, rhinorrhea, other pain, dysphagia, or difficulty breathing. Of note, the patient mentions taking two 625 Tylenol and 2.5 mg of oxycodone for these symptoms   Focused Assessment:   Pt experiencing numbness/tingling on face neck, arms, and thighs. Told to watch for these sx following parathyroidectomy. No relief in sx after doubling calcium dose.        Abnormal Results:   Labs Ordered and Resulted from Time of ED Arrival to Time of ED Departure   COMPREHENSIVE METABOLIC PANEL - Abnormal       Result Value    Sodium 144      Potassium 3.6      Carbon Dioxide (CO2) 29      Anion Gap 13      Urea Nitrogen 12.3      Creatinine 0.66      GFR Estimate >90      Calcium 8.2 (*)     Chloride 102      Glucose 99      Alkaline Phosphatase 164 (*)     AST 22      ALT 44      Protein Total 7.2      Albumin 4.3      Bilirubin Total 0.8     MAGNESIUM - Abnormal    Magnesium 1.5 (*)    IONIZED CALCIUM - Abnormal    Calcium Ionized Whole Blood 4.0 (*)    PHOSPHORUS - Normal    Phosphorus 4.5     ROUTINE UA WITH MICROSCOPIC - Normal    Color Urine Straw      Appearance Urine Clear      Glucose Urine Negative      Bilirubin Urine Negative      Ketones Urine Negative      Specific Gravity Urine 1.005      Blood Urine Negative      pH Urine 7.0      Protein Albumin Urine Negative      Urobilinogen Urine Normal      Nitrite Urine Negative      Leukocyte Esterase Urine Negative      RBC Urine <1      WBC Urine 1     CBC WITH PLATELETS AND DIFFERENTIAL    WBC Count 7.3      RBC Count 4.20      Hemoglobin 12.4      Hematocrit 37.1      MCV 88      MCH 29.5      MCHC 33.4      RDW 13.8      Platelet Count 340      % Neutrophils 56      % Lymphocytes 29      % Monocytes 11      % Eosinophils 2      % Basophils 1      % Immature Granulocytes 1      NRBCs per 100 WBC 0      Absolute Neutrophils 4.2      Absolute Lymphocytes 2.1      Absolute Monocytes 0.8      Absolute Eosinophils 0.1      Absolute Basophils 0.0       Absolute Immature Granulocytes 0.0      Absolute NRBCs 0.0     CALCIUM   MAGNESIUM        No orders to display       Treatments provided: labs, see MAR  Family Comments: daughter at beside  OBS brochure/video discussed/provided to patient:  Yes  ED Medications:   Medications   lidocaine 1 % 0.1-1 mL (has no administration in time range)   lidocaine (LMX4) cream ( Topical $Given 2/10/24 1748)   sodium chloride (PF) 0.9% PF flush 3 mL (has no administration in time range)   sodium chloride (PF) 0.9% PF flush 3 mL (has no administration in time range)   magnesium sulfate 2 g in 50 mL sterile water intermittent infusion (2 g Intravenous $New Bag 2/11/24 0050)   calcium gluconate 1 g in 50 mL in sodium chloride intermittent infusion (1 g Intravenous $Given 2/11/24 0032)       Drips infusing:  No  For the majority of the shift this patient was Green.   Interventions performed were n/a.    Sepsis treatment initiated: No    Cares/treatment/interventions/medications to be completed following ED care: n/a    ED Nurse Name: Nicci Vides RN  2:00 AM RECEIVING UNIT ED HANDOFF REVIEW    Above ED Nurse Handoff Report was reviewed: Yes  Reviewed by: Nicci Narvaez RN on February 11, 2024 at 3:28 AM

## 2024-02-11 NOTE — H&P
[facility]    History and Physical - Hospitalist Service       Date of Admission:  2/10/2024    Assessment & Plan     Nila Viveros is a 68 year old female w/PMH R leg fracture, primary hyperparathyroidism s/p exision L superior parathyroid adenoma 2/7/2024 who presents from home with parasthesias and found to have low calcium, mag    primary hyperparathyroidism s/p excision L superior parathyroid adenoma   Hypocalcemia, hypomag  -had surgery with Dr. Briggs 2/7/24 with above findings of adenoma. Did well initially but past two days developed parasthesias despite being on calcitriol and calcium carbonate. Provider directed her to increase calcitriol to TID but symptoms worsened and presented to ED.  -in ED had Ca 8.2 and ionized Ca 4, mag 1.5   -repleted with IV calcium gluconate and IV mag with significant symptom improvement  -rpt labs this am and consider further repletion, would try to hit at least lower level of normal range  -cont recently adjusted dosing of calcitriol TID and calcium gluconate    R leg fracture  -in boot, outpt follow-up. Resumed home oxy     Hx HTN, arthritis  -await med rec    Diet:  regular diet  DVT Prophylaxis: Pneumatic Compression Devices  Lau Catheter: Not present  Lines: None     Cardiac Monitoring: None  Code Status:  full code      Edilberto Atkins DO  Hospitalist Service  [facility]  Securely message with [employer] (more info)  Text page via [employer] Paging/Directory     ______________________________________________________________________    Chief Complaint   parasthesias    History of Present Illness   Nila Viveros is a 68 year old female w/PMH R leg fracture, primary hyperparathyroidism s/p exision L superior parathyroid adenoma 2/7/2024 who presents from home with parasthesias. She had parathyroid surgery on Wednesday here with Dr. Briggs, initially did well post op but over past 2 days had worsening parasthesias. Mostly in  her extremities but today felt that they were all over. Had called her provider and was told to increase the calcitriol from BID to TID and cont calcium carbonate but despite that symptoms worsened and when they contacted provider again were told to go to ED. She denies any headache, vision changes, fever, chills but does note some diarrhea, malaise.    In ED was noted to have low calcium, ionized calcium and magnesium levels and was repleted. EKG was sinus rhythm, no CP or sob. She reports symptoms have already significantly improved after IV calcium/mag      Past Medical History    Past Medical History:   Diagnosis Date    Degenerative arthritis of knee, bilateral     Giardia     has intermittent flares of diarrhea    H/O vein stripping 2006    Right leg    Hx of prior ablation treatment 2008    Lap ablation for uterine fibroids    Hypertension     Infection due to 2019 novel coronavirus     Knee mass, right     Pap smear for cervical cancer screening     S/P LEEP     S/P right knee arthroscopy     S/P tubal ligation 1988       Past Surgical History   Past Surgical History:   Procedure Laterality Date    ARTHROPLASTY KNEE Right 10/3/2023    Procedure: RIGHT TOTAL KNEE ARTHROPLASTY;  Surgeon: Darshan Caban MD;  Location: [facility] OR    PARATHYROIDECTOMY N/A 2/7/2024    Procedure: SUBTOTAL PARATHYROIDECTOMY;  Surgeon: Ally Briggs MD;  Location: [facility] OR       Prior to Admission Medications   Prior to Admission Medications   Prescriptions Last Dose Informant Patient Reported? Taking?   EPINEPHrine (ANY BX GENERIC EQUIV) 0.3 MG/0.3ML injection 2-pack  Self Yes No   Sig: Inject 0.3 mg into the muscle as needed for anaphylaxis May repeat one time in 5-15 minutes if response to initial dose is inadequate.   FLUTICASONE PROPIONATE, NASAL, NA  Self Yes No   Sig: Spray 1 spray in nostril daily as needed   HYDROmorphone (DILAUDID) 2 MG tablet   Yes No   Sig: Take 2 mg by mouth every 6 hours as needed for severe pain    Misc Natural Products (OSTEO BI-FLEX ADV JOINT SHIELD) TABS  Self Yes No   Sig: Take 1,500 mg by mouth daily   Probiotic Product (PROBIOTIC DAILY) CAPS  Self Yes No   Sig: Take 1 capsule by mouth daily   Ubiquinol 100 MG CAPS  Self Yes No   Sig: Take 100 mg by mouth daily   acetaminophen (TYLENOL) 325 MG tablet   No No   Sig: Take 2 tablets (650 mg) by mouth every 4 hours as needed for pain (mild pain)   acetaminophen (TYLENOL) 500 MG tablet   No No   Sig: Take 2 tablets (1,000 mg) by mouth every 6 hours as needed for other (mild pain)   calcitRIOL (ROCALTROL) 0.5 MCG capsule   No No   Sig: Take 1 capsule (0.5 mcg) by mouth 2 times daily for 30 days   calcium carbonate (OS-JARRETT) 500 MG tablet   No No   Sig: Take 2 tablets (1,000 mg) by mouth 2 times daily for 7 days, THEN 2 tablets (1,000 mg) daily for 7 days.   cholecalciferol (VITAMIN D3) 125 mcg (5000 units) capsule  Self Yes No   Sig: Take 125 mcg by mouth daily   etodolac (LODINE) 200 MG capsule   No No   Sig: Take 1 capsule (200 mg) by mouth every 8 hours   lisinopril (ZESTRIL) 20 MG tablet  Self Yes No   Sig: Take 1 tablet by mouth daily   melatonin 3 MG tablet  Self Yes No   Sig: Take 3 mg by mouth nightly as needed for sleep   ondansetron (ZOFRAN) 4 MG tablet   No No   Sig: Take 1 tablet (4 mg) by mouth every 8 hours as needed for nausea   oxyCODONE (ROXICODONE) 5 MG tablet   No No   Sig: Take 1-2 tablets (5-10 mg) by mouth every 4 hours as needed for moderate to severe pain   senna-docusate (SENOKOT-S/PERICOLACE) 8.6-50 MG tablet   No No   Sig: Take 1-2 tablets by mouth 2 times daily Take while on oral narcotics to prevent or treat constipation.      Facility-Administered Medications: None        Review of Systems    The 10 point Review of Systems is negative other than noted in the HPI or here.     Social History   I have reviewed this patient's social history and updated it with pertinent information if needed.  Social History     Tobacco Use    Smoking  status: Never     Passive exposure: Never    Smokeless tobacco: Never   Substance Use Topics    Alcohol use: Not Currently    Drug use: Never         Family History   No significant family history reported      Allergies   Allergies   Allergen Reactions    Azithromycin Hives    Bee Venom Hives    Zinc Nausea    Nickel Rash    Silver Rash        Physical Exam   Vital Signs: Temp: 98.7  F (37.1  C) Temp src: Temporal BP: 135/81 Pulse: 66   Resp: 16 SpO2: 98 % O2 Device: None (Room air)    Weight: 0 lbs 0 oz    Constitutional: awake, alert, and cooperative  Eyes: pupils equal, round and reactive to light and conjunctiva normal  ENT: surgical site covered with dressing in midline of anterior neck  Respiratory: no increased work of breathing, good air exchange, and clear to auscultation  Cardiovascular: regular rate and rhythm and no murmur noted  GI: normal bowel sounds, soft, and non-distended  Skin: no bruising or bleeding  Neurologic: alert, oriented, R leg in boot but moving all other extremities without obvious deficit    60 MINUTES SPENT BY ME on the date of service doing chart review, history, exam, documentation & further activities per the note.      Data   ------------------------- PAST 24 HR DATA REVIEWED -----------------------------------------------    I have personally reviewed the following data over the past 24 hrs:    7.3  \   12.4   / 340     144 102 12.3 /  99   3.6 29 0.66 \     ALT: 44 AST: 22 AP: 164 (H) TBILI: 0.8   ALB: 4.3 TOT PROTEIN: 7.2 LIPASE: N/A       Imaging results reviewed over the past 24 hrs:   No results found for this or any previous visit (from the past 24 hour(s)).

## 2024-02-11 NOTE — PLAN OF CARE
PRIMARY DIAGNOSIS: Hypocalcemia   OUTPATIENT/OBSERVATION GOALS TO BE MET BEFORE DISCHARGE:  ADLs back to baseline: Yes    Activity and level of assistance: Ax1 to bedside commode    Pain status: Improved-controlled with oral pain medications.    Return to near baseline physical activity: Yes     Discharge Planner Nurse   Safe discharge environment identified: Yes  Barriers to discharge: Yes       Entered by: Makenna Benson RN 02/11/2024 12:44 PM     Please review provider order for any additional goals.   Nurse to notify provider when observation goals have been met and patient is ready for discharge.    A&Ox4, patient denies tingling at this time, family at the bedside, boot on R foot, Ax1 with walker to bathroom, ca 7.8- calcium gluconate infusing, plan to recheck this evening 1700.

## 2024-02-11 NOTE — PLAN OF CARE
Goal Outcome Evaluation:    PRIMARY DIAGNOSIS: HYPOCALCEMIA / HYPOMAGNESIUM  OUTPATIENT/OBSERVATION GOALS TO BE MET BEFORE DISCHARGE:  ADLs back to baseline: No    Activity and level of assistance: assist x 1 with pivot to [facility]    Pain status: Improved-controlled with oral pain medications.    Return to near baseline physical activity: No     Discharge Planner Nurse   Safe discharge environment identified: Yes  Barriers to discharge: Yes       Entered by: Nicci Narvaez RN 02/11/2024   /84   Pulse 72   Temp 97.9  F (36.6  C) (Oral)   Resp 16   SpO2 96%     Alert & oriented x4. Denies pain, numbness, tingling and chills.. VSS. Assist x 1 with Pivot to [facility].  Pt has a boot on right leg due to fracture.On tele running SR-60s.. ongoing plan of care.  Please review provider order for any additional goals.   Nurse to notify provider when observation goals have been met and patient is ready for discharge.

## 2024-02-12 PROBLEM — E83.42 HYPOMAGNESEMIA: Status: ACTIVE | Noted: 2024-02-12

## 2024-02-12 LAB
ALBUMIN SERPL BCG-MCNC: 3.9 G/DL (ref 3.5–5.2)
ALP SERPL-CCNC: 137 U/L (ref 40–150)
ALT SERPL W P-5'-P-CCNC: 32 U/L (ref 0–50)
ANION GAP SERPL CALCULATED.3IONS-SCNC: 11 MMOL/L (ref 7–15)
AST SERPL W P-5'-P-CCNC: 12 U/L (ref 0–45)
ATRIAL RATE - MUSE: 71 BPM
BILIRUB SERPL-MCNC: 1.2 MG/DL
BUN SERPL-MCNC: 15.7 MG/DL (ref 8–23)
CA-I BLD-MCNC: 4.2 MG/DL (ref 4.4–5.2)
CALCIUM SERPL-MCNC: 8.1 MG/DL (ref 8.8–10.2)
CALCIUM SERPL-MCNC: 8.1 MG/DL (ref 8.8–10.2)
CHLORIDE SERPL-SCNC: 101 MMOL/L (ref 98–107)
CREAT SERPL-MCNC: 0.74 MG/DL (ref 0.51–0.95)
DEPRECATED HCO3 PLAS-SCNC: 29 MMOL/L (ref 22–29)
DIASTOLIC BLOOD PRESSURE - MUSE: NORMAL MMHG
EGFRCR SERPLBLD CKD-EPI 2021: 89 ML/MIN/1.73M2
ERYTHROCYTE [DISTWIDTH] IN BLOOD BY AUTOMATED COUNT: 13.8 % (ref 10–15)
GLUCOSE SERPL-MCNC: 97 MG/DL (ref 70–99)
HCT VFR BLD AUTO: 36.6 % (ref 35–47)
HGB BLD-MCNC: 12 G/DL (ref 11.7–15.7)
INTERPRETATION ECG - MUSE: NORMAL
MAGNESIUM SERPL-MCNC: 2.2 MG/DL (ref 1.7–2.3)
MCH RBC QN AUTO: 29.5 PG (ref 26.5–33)
MCHC RBC AUTO-ENTMCNC: 32.8 G/DL (ref 31.5–36.5)
MCV RBC AUTO: 90 FL (ref 78–100)
P AXIS - MUSE: 32 DEGREES
PHOSPHATE SERPL-MCNC: 5.9 MG/DL (ref 2.5–4.5)
PLATELET # BLD AUTO: 305 10E3/UL (ref 150–450)
POTASSIUM SERPL-SCNC: 3.9 MMOL/L (ref 3.4–5.3)
PR INTERVAL - MUSE: 142 MS
PROT SERPL-MCNC: 6.5 G/DL (ref 6.4–8.3)
QRS DURATION - MUSE: 84 MS
QT - MUSE: 416 MS
QTC - MUSE: 452 MS
R AXIS - MUSE: 3 DEGREES
RBC # BLD AUTO: 4.07 10E6/UL (ref 3.8–5.2)
SODIUM SERPL-SCNC: 141 MMOL/L (ref 135–145)
SYSTOLIC BLOOD PRESSURE - MUSE: NORMAL MMHG
T AXIS - MUSE: 36 DEGREES
VENTRICULAR RATE- MUSE: 71 BPM
WBC # BLD AUTO: 6 10E3/UL (ref 4–11)

## 2024-02-12 PROCEDURE — 250N000013 HC RX MED GY IP 250 OP 250 PS 637: Performed by: STUDENT IN AN ORGANIZED HEALTH CARE EDUCATION/TRAINING PROGRAM

## 2024-02-12 PROCEDURE — 82374 ASSAY BLOOD CARBON DIOXIDE: CPT | Performed by: HOSPITALIST

## 2024-02-12 PROCEDURE — 82040 ASSAY OF SERUM ALBUMIN: CPT | Performed by: HOSPITALIST

## 2024-02-12 PROCEDURE — 96376 TX/PRO/DX INJ SAME DRUG ADON: CPT

## 2024-02-12 PROCEDURE — 250N000011 HC RX IP 250 OP 636: Performed by: STUDENT IN AN ORGANIZED HEALTH CARE EDUCATION/TRAINING PROGRAM

## 2024-02-12 PROCEDURE — 250N000013 HC RX MED GY IP 250 OP 250 PS 637: Performed by: SURGERY

## 2024-02-12 PROCEDURE — 120N000001 HC R&B MED SURG/OB

## 2024-02-12 PROCEDURE — G0378 HOSPITAL OBSERVATION PER HR: HCPCS

## 2024-02-12 PROCEDURE — 82330 ASSAY OF CALCIUM: CPT | Performed by: HOSPITALIST

## 2024-02-12 PROCEDURE — 36415 COLL VENOUS BLD VENIPUNCTURE: CPT | Performed by: HOSPITALIST

## 2024-02-12 PROCEDURE — 250N000013 HC RX MED GY IP 250 OP 250 PS 637: Performed by: HOSPITALIST

## 2024-02-12 PROCEDURE — 85027 COMPLETE CBC AUTOMATED: CPT | Performed by: HOSPITALIST

## 2024-02-12 PROCEDURE — 99232 SBSQ HOSP IP/OBS MODERATE 35: CPT | Performed by: STUDENT IN AN ORGANIZED HEALTH CARE EDUCATION/TRAINING PROGRAM

## 2024-02-12 PROCEDURE — 83735 ASSAY OF MAGNESIUM: CPT | Performed by: HOSPITALIST

## 2024-02-12 PROCEDURE — 84100 ASSAY OF PHOSPHORUS: CPT | Performed by: HOSPITALIST

## 2024-02-12 RX ORDER — CALCITRIOL 0.25 UG/1
0.5 CAPSULE, LIQUID FILLED ORAL ONCE
Status: COMPLETED | OUTPATIENT
Start: 2024-02-12 | End: 2024-02-12

## 2024-02-12 RX ORDER — CALCIUM GLUCONATE 20 MG/ML
1 INJECTION, SOLUTION INTRAVENOUS ONCE
Status: COMPLETED | OUTPATIENT
Start: 2024-02-12 | End: 2024-02-12

## 2024-02-12 RX ORDER — CALCITRIOL 0.25 UG/1
1 CAPSULE, LIQUID FILLED ORAL 2 TIMES DAILY
Status: DISCONTINUED | OUTPATIENT
Start: 2024-02-12 | End: 2024-02-13 | Stop reason: HOSPADM

## 2024-02-12 RX ORDER — CALCIUM CARBONATE 500(1250)
2 TABLET ORAL
Status: DISCONTINUED | OUTPATIENT
Start: 2024-02-12 | End: 2024-02-13 | Stop reason: HOSPADM

## 2024-02-12 RX ADMIN — SENNOSIDES AND DOCUSATE SODIUM 1 TABLET: 8.6; 5 TABLET ORAL at 21:05

## 2024-02-12 RX ADMIN — CALCITRIOL CAPSULES 0.25 MCG 0.5 MCG: 0.25 CAPSULE ORAL at 09:39

## 2024-02-12 RX ADMIN — ACETAMINOPHEN 650 MG: 325 TABLET, FILM COATED ORAL at 22:35

## 2024-02-12 RX ADMIN — CALCITRIOL CAPSULES 0.25 MCG 1 MCG: 0.25 CAPSULE ORAL at 21:05

## 2024-02-12 RX ADMIN — CALCITRIOL CAPSULES 0.25 MCG 0.5 MCG: 0.25 CAPSULE ORAL at 08:24

## 2024-02-12 RX ADMIN — CALCIUM 1000 MG: 500 TABLET ORAL at 17:01

## 2024-02-12 RX ADMIN — CALCIUM 1000 MG: 500 TABLET ORAL at 21:05

## 2024-02-12 RX ADMIN — CALCIUM 1000 MG: 500 TABLET ORAL at 08:24

## 2024-02-12 RX ADMIN — LISINOPRIL 20 MG: 20 TABLET ORAL at 08:24

## 2024-02-12 RX ADMIN — CALCIUM GLUCONATE 1 G: 20 INJECTION, SOLUTION INTRAVENOUS at 10:28

## 2024-02-12 RX ADMIN — CALCIUM 1000 MG: 500 TABLET ORAL at 12:29

## 2024-02-12 ASSESSMENT — ACTIVITIES OF DAILY LIVING (ADL)
ADLS_ACUITY_SCORE: 23
ADLS_ACUITY_SCORE: 23
ADLS_ACUITY_SCORE: 24
ADLS_ACUITY_SCORE: 23
ADLS_ACUITY_SCORE: 24
ADLS_ACUITY_SCORE: 24
ADLS_ACUITY_SCORE: 23
ADLS_ACUITY_SCORE: 24
ADLS_ACUITY_SCORE: 23
ADLS_ACUITY_SCORE: 23

## 2024-02-12 NOTE — PLAN OF CARE
PRIMARY DIAGNOSIS: Hypocalcemia   OUTPATIENT/OBSERVATION GOALS TO BE MET BEFORE DISCHARGE:  ADLs back to baseline: Yes    Activity and level of assistance: Up with standby assistance.    Pain status: Pain free.    Return to near baseline physical activity: Yes     Discharge Planner Nurse   Safe discharge environment identified: Yes  Barriers to discharge: Yes       Entered by: Makenna Benson RN 02/12/2024 8:43 AM     Please review provider order for any additional goals.   Nurse to notify provider when observation goals have been met and patient is ready for discharge.    A&Ox4, reports tingling around mouth, up SBA w/ walker, tolerating regular, tele, lungs- clear, bowels- active, voiding, ca 8.2 this am

## 2024-02-12 NOTE — PLAN OF CARE
Goal Outcome Evaluation:    PRIMARY DIAGNOSIS: HYPOCALCEMIA   OUTPATIENT/OBSERVATION GOALS TO BE MET BEFORE DISCHARGE:  ADLs back to baseline: No    Activity and level of assistance: assist x 1 with pivot to [facility]    Pain status: Improved-controlled with oral pain medications.    Return to near baseline physical activity: No     Discharge Planner Nurse   Safe discharge environment identified: Yes  Barriers to discharge: Yes       Entered by: Nicci Narvaez RN 02/12/2024   /84 (BP Location: Left arm)   Pulse 79   Temp 98.1  F (36.7  C) (Oral)   Resp 16   SpO2 94%    Alert & oriented x 4.  VSS. Assist x 1 with walker.Denies pain,verbalizes  some tingling to her hands.Boot remains to right leg & NWB. On tele reading SR-60s.Will continue to provide supportive cares..  Please review provider order for any additional goals.   Nurse to notify provider when observation goals have been met and patient is ready for discharge.

## 2024-02-12 NOTE — PROGRESS NOTES
General surgery progress note  I am out of the hospital today, but was informed of the patient s hospital admission via her [employer] Message.     To clarify, patient had 4-gland hyperplasia and had 3.5 parathyroid glands removed.     Calcium can be increased to 1000 mg elemental calcium QID. Calcitriol can be dosed at BID (usually on 0.5 mg BID is sufficient).    Will be back tomorrow and will round on patient if she remain s in the hospital.    Ally Briggs MD

## 2024-02-12 NOTE — PROGRESS NOTES
[facility]    Medicine Progress Note - Hospitalist Service    Date of Admission:  2/10/2024    Assessment & Plan   Nila Viveros is a 68 year old female w/PMH R leg fracture, primary hyperparathyroidism s/p exision L superior parathyroid adenoma, who was admitted for observation on 2/7/2024 with parasthesias and found to have low calcium, mag.  Ongoing monitoring of serum calcium levels, adjusting calcitriol and replacing IV calcium as needed.     Primary hyperparathyroidism s/p excision L superior parathyroid adenoma   Hypocalcemia, hypomag  Had surgery with Dr. Briggs 2/7/24 with above findings of adenoma, had persistently elevated PTH during surgery, so 2 of 4 glands were excised entirely and a 3rd one was biopsied. Did well initially but  for two days prior to presentation, developed parasthesias despite being on calcitriol and calcium carbonate. Provider directed her to increase calcitriol to TID but symptoms worsened and presented to ED. case discussed with endocrinology who recommended increasing calcitriol to 1 mcg twice daily, continuing vitamin D 5000 units daily.  Calcium approaching low end of normal value on labs 2/12, however patient still feeling flushed and having paresthesias in the morning.  She is concerned about these feelings as they make her feel uncomfortable and she lives alone.  Increased calcitriol and giving another dose of IV calcium replacement, will recheck level in a.m. 2/13 and can likely discharge home if symptoms have improved.  - In ED had Ca 8.2 and ionized Ca 4, mag 1.5   - Replaced with IV calcium gluconate and IV mag with significant symptom improvement  - Intermittent symptoms 2/11, morning of 2/12 including facial flushing and perioral numbness/tingling   - Additional IV calcium replacement morning of 2/12  - Increased calcitriol to 1 mcg twice daily   - Per endocrinology, goal calcium 8.4-8.6 without symptoms  - Repeat ionized calcium 2/13, can  "likely discharge home if symptoms improve overnight      R leg fracture -in boot, outpt follow-up. Resumed home oxy   HTN - PTA lisinopril  Arthritis - PRN tylenol       Observation Goals: -diagnostic tests and consults completed and resulted, -vital signs normal or at patient baseline, Nurse to notify provider when observation goals have been met and patient is ready for discharge.  Diet: Regular Diet Adult    DVT Prophylaxis: Pneumatic Compression Devices  Lau Catheter: Not present  Lines: None     Cardiac Monitoring: ACTIVE order. Indication: Electrolyte Imbalance (24 hours)- Magnesium <1.3 mg/ml; Potassium < =2.8 or > 5.5 mg/ml  Code Status: Full Code      Clinically Significant Risk Factors Present on Admission          # Hypocalcemia: Lowest Ca = 7.8 mg/dL in last 2 days, will monitor and replace as appropriate   # Hypomagnesemia: Lowest Mg = 1.5 mg/dL in last 2 days, will replace as needed       # Hypertension: Home medication list includes antihypertensive(s)      # Overweight: Estimated body mass index is 26.61 kg/m  as calculated from the following:    Height as of 2/7/24: 1.626 m (5' 4\").    Weight as of 2/7/24: 70.3 kg (155 lb).              Disposition Plan     Expected Discharge Date: 02/12/2024      Destination: home with family              Mohsen Gerard MD  Hospitalist Service  [facility]  Securely message with [employer] (more info)  Text page via [facility] Paging/Directory   ______________________________________________________________________    Interval History   No acute events overnight.  Symptoms were improved throughout the afternoon and evening 2/11, but had a return of some facial flushing sensation and perioral numbness/tingling morning of 2/12.  Patient frustrated by this return of symptoms.  Discussed increasing oral calcitriol and replacing calcium with IV, goal is to be low normal calcium with improvement of symptoms.  Patient agreeable with this plan.  Hopeful for " discharge 2/13.    Physical Exam   Vital Signs: Temp: 98  F (36.7  C) Temp src: Oral BP: (!) 120/90 Pulse: 91   Resp: 16 SpO2: 94 % O2 Device: None (Room air)    Weight: 0 lbs 0 oz    Constitutional: Awake, alert, no distress, and cooperative  HEENT: Reports numbness/tingling in perioral area, sensation intact with light touch, mildly positive Chvostek sign, negative Trousseau sign  Cardiovascular: Regular rate and rhythm, normal S1 and S2, no S3 or S4, and no murmur noted  Respiratory: No increased work of breathing, good air exchange, clear to auscultation bilaterally, no crackles or wheezing  Gastrointestinal: Abdomen soft, non-tender, non-distended. BS normal. No masses, organomegaly     Medical Decision Making       45 MINUTES SPENT BY ME on the date of service doing chart review, history, exam, documentation & further activities per the note.      Data     I have personally reviewed the following data over the past 24 hrs:    6.0  \   12.0   / 305     141 101 15.7 /  97   3.9 29 0.74 \     ALT: 32 AST: 12 AP: 137 TBILI: 1.2   ALB: 3.9 TOT PROTEIN: 6.5 LIPASE: N/A

## 2024-02-12 NOTE — PROGRESS NOTES
Informal Recommendations Note    I was called by ED provider on 2/10/24 at  0100 AM to provide input for Nila Viveros. The nature of this request for input does not permit comprehensive review of health records or patient interview.  I was not requested or am not able to personally examine the patient at this time.    Nila is a 68 year old female who recently had excision of left superior parathyroid adenoma related to primary hyperparathyroidism on 02/7/2024.  Patient started developing paresthesia despite being on calcitriol 0.5 twice daily, and calcium carbonate 1000 mg twice daily.  Patient increased calcitriol dose to 3 times daily but symptoms continue to worse and patient presented to ER.  In ER patient was found to have calcium of 8.2, ionized calcium 4, magnesium 1.5.  Patient received IV calcium gluconate and IV magnesium.  I was contacted by ED provider to discuss about goals of treatment in this patient.    Based on the information provided, my recommendations are as follows:   Goals of treatment in hypoparathyroid patient is to keep calcium in the lower limit of normal which would be around 8.4-8.6 without symptoms.  If needed patient's calcitriol dose can be increased to 1 mcg twice daily.  Dose needs to be tapered down soon after calcium levels improved.  Continue vitamin D 5000 units daily  Increase calcium supplementation to every 6-8 hours to maintain calcium levels at goal.    These recommendations are not intended to take the place of the care team's clinical judgement, which should always be utilized to provide the most appropriate care to meet the unique needs of each patient.     Sean Lino MD

## 2024-02-12 NOTE — PLAN OF CARE
PRIMARY DIAGNOSIS: Hypocalcemia   OUTPATIENT/OBSERVATION GOALS TO BE MET BEFORE DISCHARGE:  ADLs back to baseline: Yes    Activity and level of assistance: Up with standby assistance.    Pain status: Pain free.    Return to near baseline physical activity: Yes     Discharge Planner Nurse   Safe discharge environment identified: Yes  Barriers to discharge: Yes       Entered by: Makenna Benson RN 02/12/2024 1:55 PM     Please review provider order for any additional goals.   Nurse to notify provider when observation goals have been met and patient is ready for discharge.    A&Ox4, up SBA w/ walker, tolerating regular, tele, lungs- clear, bowels- active, voiding, ca 8.2 this am, patient reported increased tingling- provider notified and ordered calcium gluconate, daughter at the bedside, plan to recheck labs tomorrow am and possible discharge if patients symptoms are improved.    36.3

## 2024-02-12 NOTE — PLAN OF CARE
Goal Outcome Evaluation:    PRIMARY DIAGNOSIS: HYPOCALCEMIA   OUTPATIENT/OBSERVATION GOALS TO BE MET BEFORE DISCHARGE:  ADLs back to baseline: No    Activity and level of assistance: assist x 1 with pivot to [facility]    Pain status: Improved-controlled with oral pain medications.    Return to near baseline physical activity: No     Discharge Planner Nurse   Safe discharge environment identified: Yes  Barriers to discharge: Yes       Entered by: Nicci Narvaez RN 02/11/2024   BP (!) 145/101 (BP Location: Left arm)   Pulse 85   Temp 98.5  F (36.9  C) (Oral)   Resp 18   SpO2 95%      Please review provider order for any additional goals.   Nurse to notify provider when observation goals have been met and patient is ready for discharge.

## 2024-02-12 NOTE — UTILIZATION REVIEW
"  Admission Status; Secondary Review Determination         Under the authority of the Utilization Management Committee, the utilization review process indicated a secondary review on the above patient.  The review outcome is based on review of the medical records, discussions with staff, and applying clinical experience noted on the date of the review.        (xxx)      Inpatient Status Appropriate - This patient's medical care is consistent with medical management for inpatient care and reasonable inpatient medical practice.      () Observation Status Appropriate - This patient does not meet hospital inpatient criteria and is placed in observation status. If this patient's primary payer is Medicare and was admitted as an inpatient, Condition Code 44 should be used and patient status changed to \"observation\".   () Admission Status NOT Appropriate - This patient's medical care is not consistent with medical management for Inpatient or Observation Status.          RATIONALE FOR DETERMINATION     Nila Viveros is a 68 year old female w/ PMH R leg fracture, primary hyperparathyroidism s/p exision L superior parathyroid adenoma on 2/7/2024, who was admitted for observation on 2/10/2024 with parasthesias   She developed parasthesias despite being on calcitriol and calcium carbonate. Provider directed her to increase calcitriol to TID but symptoms worsened and presented to ED.  Calcium level 8.2, ionized calcium 4.0 and magnesium 1.5.  She was admitted for IV calcium and magnesium replacement, medication adjustments, and close clinical monitoring. Consulted endocrinology who recommended increasing calcitriol to 1 mcg twice daily, continuing vitamin D 5000 units daily.  Calcium approaching low end of normal value on labs 2/12, however patient still feeling flushed and having paresthesias.  Calcitriol increased and another dose of IV calcium replacement administered.  She is not medically stable for discharge today and " will require at least 1-2 days further hospitalization for management.  She has failed a trial of observation status and IP status is appropriate at  this time  I have communicated with Dr. Gerard.    The severity of illness, intensity of service provided, expected LOS and risk for adverse outcome make the care complex, high risk and appropriate for hospital admission.        The information on this document is developed by the utilization review team in order for the business office to ensure compliance.  This only denotes the appropriateness of proper admission status and does not reflect the quality of care rendered.         The definitions of Inpatient Status and Observation Status used in making the determination above are those provided in the CMS Coverage Manual, Chapter 1 and Chapter 6, section 70.4.      Sincerely,     Aster Suárez MD  Physician Advisor   Utilization Review/ Case Management  [facility].

## 2024-02-13 VITALS
DIASTOLIC BLOOD PRESSURE: 90 MMHG | SYSTOLIC BLOOD PRESSURE: 136 MMHG | RESPIRATION RATE: 18 BRPM | TEMPERATURE: 98.1 F | OXYGEN SATURATION: 96 % | HEART RATE: 86 BPM

## 2024-02-13 DIAGNOSIS — E83.51 IATROGENIC HYPOCALCEMIA: Primary | ICD-10-CM

## 2024-02-13 LAB
ANION GAP SERPL CALCULATED.3IONS-SCNC: 11 MMOL/L (ref 7–15)
BUN SERPL-MCNC: 18.2 MG/DL (ref 8–23)
CA-I BLD-MCNC: 4.2 MG/DL (ref 4.4–5.2)
CALCIUM SERPL-MCNC: 8.2 MG/DL (ref 8.8–10.2)
CALCIUM SERPL-MCNC: 8.8 MG/DL (ref 8.8–10.2)
CHLORIDE SERPL-SCNC: 101 MMOL/L (ref 98–107)
CREAT SERPL-MCNC: 0.7 MG/DL (ref 0.51–0.95)
DEPRECATED HCO3 PLAS-SCNC: 28 MMOL/L (ref 22–29)
EGFRCR SERPLBLD CKD-EPI 2021: >90 ML/MIN/1.73M2
GLUCOSE SERPL-MCNC: 100 MG/DL (ref 70–99)
HOLD SPECIMEN: NORMAL
MAGNESIUM SERPL-MCNC: 1.5 MG/DL (ref 1.7–2.3)
MAGNESIUM SERPL-MCNC: 2.3 MG/DL (ref 1.7–2.3)
POTASSIUM SERPL-SCNC: 3.8 MMOL/L (ref 3.4–5.3)
SODIUM SERPL-SCNC: 140 MMOL/L (ref 135–145)

## 2024-02-13 PROCEDURE — 83735 ASSAY OF MAGNESIUM: CPT | Performed by: HOSPITALIST

## 2024-02-13 PROCEDURE — 250N000011 HC RX IP 250 OP 636: Performed by: HOSPITALIST

## 2024-02-13 PROCEDURE — 250N000013 HC RX MED GY IP 250 OP 250 PS 637: Performed by: STUDENT IN AN ORGANIZED HEALTH CARE EDUCATION/TRAINING PROGRAM

## 2024-02-13 PROCEDURE — 82310 ASSAY OF CALCIUM: CPT | Performed by: HOSPITALIST

## 2024-02-13 PROCEDURE — 99239 HOSP IP/OBS DSCHRG MGMT >30: CPT | Performed by: HOSPITALIST

## 2024-02-13 PROCEDURE — 82330 ASSAY OF CALCIUM: CPT | Performed by: STUDENT IN AN ORGANIZED HEALTH CARE EDUCATION/TRAINING PROGRAM

## 2024-02-13 PROCEDURE — 83735 ASSAY OF MAGNESIUM: CPT | Performed by: STUDENT IN AN ORGANIZED HEALTH CARE EDUCATION/TRAINING PROGRAM

## 2024-02-13 PROCEDURE — 96376 TX/PRO/DX INJ SAME DRUG ADON: CPT

## 2024-02-13 PROCEDURE — G0378 HOSPITAL OBSERVATION PER HR: HCPCS

## 2024-02-13 PROCEDURE — 36415 COLL VENOUS BLD VENIPUNCTURE: CPT | Performed by: STUDENT IN AN ORGANIZED HEALTH CARE EDUCATION/TRAINING PROGRAM

## 2024-02-13 PROCEDURE — 80048 BASIC METABOLIC PNL TOTAL CA: CPT | Performed by: STUDENT IN AN ORGANIZED HEALTH CARE EDUCATION/TRAINING PROGRAM

## 2024-02-13 PROCEDURE — 250N000013 HC RX MED GY IP 250 OP 250 PS 637: Performed by: SURGERY

## 2024-02-13 PROCEDURE — 250N000013 HC RX MED GY IP 250 OP 250 PS 637: Performed by: HOSPITALIST

## 2024-02-13 PROCEDURE — 36415 COLL VENOUS BLD VENIPUNCTURE: CPT | Performed by: HOSPITALIST

## 2024-02-13 RX ORDER — MAGNESIUM OXIDE 400 MG/1
400 TABLET ORAL 2 TIMES DAILY
Status: DISCONTINUED | OUTPATIENT
Start: 2024-02-13 | End: 2024-02-13 | Stop reason: HOSPADM

## 2024-02-13 RX ORDER — MAGNESIUM OXIDE 400 MG/1
400 TABLET ORAL 2 TIMES DAILY
Qty: 20 TABLET | Refills: 0 | Status: SHIPPED | OUTPATIENT
Start: 2024-02-13 | End: 2024-02-13

## 2024-02-13 RX ORDER — CALCIUM CARBONATE 500(1250)
2 TABLET ORAL 4 TIMES DAILY
COMMUNITY
Start: 2024-02-13 | End: 2024-02-15

## 2024-02-13 RX ORDER — MAGNESIUM OXIDE 400 MG/1
400 TABLET ORAL DAILY
Qty: 20 TABLET | Refills: 0 | Status: SHIPPED | OUTPATIENT
Start: 2024-02-13

## 2024-02-13 RX ORDER — CALCITRIOL 0.5 UG/1
1 CAPSULE, LIQUID FILLED ORAL 2 TIMES DAILY
COMMUNITY
Start: 2024-02-13

## 2024-02-13 RX ORDER — MAGNESIUM SULFATE HEPTAHYDRATE 40 MG/ML
4 INJECTION, SOLUTION INTRAVENOUS ONCE
Status: COMPLETED | OUTPATIENT
Start: 2024-02-13 | End: 2024-02-13

## 2024-02-13 RX ADMIN — MAGNESIUM SULFATE 4 G: 4 INJECTION INTRAVENOUS at 08:53

## 2024-02-13 RX ADMIN — CALCIUM 1000 MG: 500 TABLET ORAL at 06:38

## 2024-02-13 RX ADMIN — CALCIUM 1000 MG: 500 TABLET ORAL at 11:24

## 2024-02-13 RX ADMIN — SENNOSIDES AND DOCUSATE SODIUM 1 TABLET: 8.6; 5 TABLET ORAL at 08:06

## 2024-02-13 RX ADMIN — CALCITRIOL CAPSULES 0.25 MCG 1 MCG: 0.25 CAPSULE ORAL at 08:06

## 2024-02-13 RX ADMIN — MAGNESIUM OXIDE TAB 400 MG (241.3 MG ELEMENTAL MG) 400 MG: 400 (241.3 MG) TAB at 08:51

## 2024-02-13 RX ADMIN — LISINOPRIL 20 MG: 20 TABLET ORAL at 08:06

## 2024-02-13 ASSESSMENT — ACTIVITIES OF DAILY LIVING (ADL)
ADLS_ACUITY_SCORE: 26

## 2024-02-13 NOTE — DISCHARGE SUMMARY
"[facility]  Hospitalist Discharge Summary      Date of Admission:  2/10/2024  Date of Discharge:  2/13/2024  Discharging Provider: Richard Benson MD  Discharge Service: Hospitalist Service    Discharge Diagnoses   Paresthesias  Hypocalcemia  Hypomagnesemia    Clinically Significant Risk Factors     # Overweight: Estimated body mass index is 26.61 kg/m  as calculated from the following:    Height as of 2/7/24: 1.626 m (5' 4\").    Weight as of 2/7/24: 70.3 kg (155 lb).       Follow-ups Needed After Discharge   Follow-up Appointments     Follow-up and recommended labs and tests       Follow up with Dr. Briggs next week. You will need repeat labs next week   (calcium, magnesium and phosphorus)            Unresulted Labs Ordered in the Past 30 Days of this Admission       No orders found from 1/11/2024 to 2/11/2024.        These results will be followed up by NA    Discharge Disposition   Discharged to home  Condition at discharge: Stable    Hospital Course   Nila Viveros is a 68 year old female w/PMH R leg fracture, primary hyperparathyroidism s/p exision L superior parathyroid adenoma 2/7/2024 who presents from home with parasthesias. She had parathyroid surgery on Wednesday here with Dr. Briggs, initially did well post op but over past 2 days had worsening parasthesias. Mostly in her extremities but today felt that they were all over. Had called her provider and was told to increase the calcitriol from BID to TID and cont calcium carbonate but despite that symptoms worsened and when they contacted provider again were told to go to ED. She denies any headache, vision changes, fever, chills but does note some diarrhea, malaise.     In ED was noted to have low calcium, ionized calcium and magnesium levels and was repleted. EKG was sinus rhythm, no CP or sob. She reports symptoms have already significantly improved after IV calcium/mag  Primary hyperparathyroidism s/p excision L superior " parathyroid adenoma   Hypocalcemia, hypomag  Had surgery with Dr. Briggs 2/7/24 with above findings of adenoma, had persistently elevated PTH during surgery, so 2 of 4 glands were excised entirely and a 3rd one was biopsied. Did well initially but  for two days prior to presentation, developed parasthesias despite being on calcitriol and calcium carbonate. Provider directed her to increase calcitriol to TID but symptoms worsened and presented to ED. case discussed with endocrinology who recommended increasing calcitriol to 1 mcg twice daily, continuing vitamin D 5000 units daily.  Calcium approaching low end of normal value on labs 2/12, however patient still feeling flushed and having paresthesias in the morning.  She is concerned about these feelings as they make her feel uncomfortable and she lives alone.  Increased calcitriol and giving another dose of IV calcium replacement, will recheck level in a.m. 2/13 and can likely discharge home if symptoms have improved.  - In ED had Ca 8.2 and ionized Ca 4, mag 1.5   - Replaced with IV calcium gluconate and IV mag with significant symptom improvement  - Intermittent symptoms 2/11, morning of 2/12 including facial flushing and perioral numbness/tingling   - Additional IV calcium replacement morning of 2/12  - Increased calcitriol to 1 mcg twice daily   - Per endocrinology, goal calcium 8.4-8.6 without symptoms  - Repeat ionized calcium 2/13, can likely discharge home if symptoms improve overnight        R leg fracture -in boot, outpt follow-up. Resumed home oxy   HTN - PTA lisinopril  Arthritis - PRN tylenol  .  I assumed care of patient today.  Repeat labs this afternoon showed normal calcium and magnesium levels.  I have already spoken with Dr. Briggs.  Her meds will be increased.  She will discharge home.  Dr. Briggs will see her next week with repeat labs.  Consultations This Hospital Stay   None    Code Status   Full Code    Time Spent on this Encounter   IRichard  Nilay Benson MD, personally saw the patient today and spent greater than 30 minutes discharging this patient.       Richard Benson MD  [facility] OBSERVATION DEPT  [address]  [facility] [address]  Phone: [phone]  ______________________________________________________________________    Physical Exam   Vital Signs: Temp: 98.1  F (36.7  C) Temp src: Oral BP: (!) 136/90 Pulse: 86   Resp: 18 SpO2: 96 % O2 Device: None (Room air)    Weight: 0 lbs 0 oz  Constitutional: awake, alert, cooperative, no apparent distress, and appears stated age  Eyes: Lids and lashes normal, pupils equal, round and reactive to light, extra ocular muscles intact, sclera clear, conjunctiva normal  ENT: Normocephalic, without obvious abnormality, atraumatic, sinuses nontender on palpation, external ears without lesions, oral pharynx with moist mucous membranes, tonsils without erythema or exudates, gums normal and good dentition.       Primary Care Physician   GERARDO LOPEZ    Discharge Orders      Reason for your hospital stay    Symptomatic hypocalcemia  Hypomagnesemia     Follow-up and recommended labs and tests     Follow up with Dr. Briggs next week. You will need repeat labs next week (calcium, magnesium and phosphorus)     Activity    Your activity upon discharge: activity as tolerated     Diet    Follow this diet upon discharge: Orders Placed This Encounter      Regular Diet Adult       Significant Results and Procedures   Most Recent 3 CBC's:  Recent Labs   Lab Test 02/12/24  0630 02/11/24  1023 02/10/24  2328   WBC 6.0 5.9 7.3   HGB 12.0 12.2 12.4   MCV 90 90 88    307 340     Most Recent 3 BMP's:  Recent Labs   Lab Test 02/13/24  1304 02/13/24  0602 02/12/24  0630 02/11/24  1023   NA  --  140 141 141   POTASSIUM  --  3.8 3.9 3.6   CHLORIDE  --  101 101 100   CO2  --  28 29 28   BUN  --  18.2 15.7 10.6   CR  --  0.70 0.74 0.59   ANIONGAP  --  11 11 13   JARRETT 8.8 8.2* 8.1*  8.1* 7.8*  7.8*    GLC  --  100* 97 128*     Most Recent 2 LFT's:  Recent Labs   Lab Test 02/12/24  0630 02/11/24  1023   AST 12 22   ALT 32 38   ALKPHOS 137 155*   BILITOTAL 1.2 1.1   ,   Results for orders placed or performed during the hospital encounter of 01/23/24   XR Tibia and Fibula Right 2 Views    Narrative    RIGHT TIBIA AND FIBULA TWO VIEWS  1/23/2024 3:02 PM     INDICATION: Right lower leg pain after a fall.     COMPARISON: None.      Impression    IMPRESSION:  1.  Oblique minimally displaced fracture of the right fibula distal  metaphysis.  2.  Right total knee arthroplasty.  3.  Benign-appearing focus of sclerosis in the tibia distal  metaphysis.  4.  No joint malalignment.    SUSANA KENDALL MD         SYSTEM ID:  [id number]       Discharge Medications   Current Discharge Medication List        START taking these medications    Details   magnesium oxide (MAG-OX) 400 MG tablet Take 1 tablet (400 mg) by mouth daily  Qty: 20 tablet, Refills: 0    Associated Diagnoses: Hypomagnesemia           CONTINUE these medications which have CHANGED    Details   calcitRIOL (ROCALTROL) 0.5 MCG capsule Take 2 capsules (1 mcg) by mouth 2 times daily    Associated Diagnoses: Hyperparathyroidism (H24)      calcium carbonate (OS-JARRETT) 500 MG tablet Take 2 tablets (1,000 mg) by mouth 4 times daily    Associated Diagnoses: Hyperparathyroidism (H24)           CONTINUE these medications which have NOT CHANGED    Details   acetaminophen (TYLENOL) 500 MG tablet Take 2 tablets (1,000 mg) by mouth every 6 hours as needed for other (mild pain)    Associated Diagnoses: S/P total knee arthroplasty, right      cholecalciferol (VITAMIN D3) 125 mcg (5000 units) capsule Take 125 mcg by mouth daily      EPINEPHrine (ANY BX GENERIC EQUIV) 0.3 MG/0.3ML injection 2-pack Inject 0.3 mg into the muscle as needed for anaphylaxis May repeat one time in 5-15 minutes if response to initial dose is inadequate.      FLUTICASONE PROPIONATE, NASAL, NA [place] 1 spray in  nostril daily as needed      lisinopril (ZESTRIL) 20 MG tablet Take 1 tablet by mouth daily      melatonin 3 MG tablet Take 3 mg by mouth nightly as needed for sleep      Misc Natural Products (OSTEO BI-FLEX ADV JOINT SHIELD) TABS Take 1,500 mg by mouth daily      ondansetron (ZOFRAN) 4 MG tablet Take 1 tablet (4 mg) by mouth every 8 hours as needed for nausea  Qty: 15 tablet, Refills: 0    Associated Diagnoses: Nausea      oxyCODONE (ROXICODONE) 5 MG tablet Take 2.5 mg by mouth at bedtime      Probiotic Product (PROBIOTIC DAILY) CAPS Take 1 capsule by mouth daily      senna-docusate (SENOKOT-S/PERICOLACE) 8.6-50 MG tablet Take 1-2 tablets by mouth 2 times daily Take while on oral narcotics to prevent or treat constipation.  Qty: 30 tablet, Refills: 0    Comments: While taking narcotics  Associated Diagnoses: Hyperparathyroidism (H24)      Ubiquinol 100 MG CAPS Take 100 mg by mouth daily      valACYclovir (VALTREX) 1000 mg tablet Take 1,000 mg by mouth daily as needed (cold sores)           Allergies   Allergies   Allergen Reactions    Azithromycin Hives    Bee Venom Hives    Zinc Nausea    Nickel Rash    Silver Rash

## 2024-02-13 NOTE — PROGRESS NOTES
General surgery progress note    Patient is feeling better today. Denies any numbness or tingling in the fingers or the face overnight or this morning.      Magnesium is low at 1.5 this morning, calcium is stable at 8.2 before taking her morning calcium.  Magnesium has since been replaced.     On exam, her neck is flat. Chvostek s sign is negative.    Recommend re-check of calcium and magnesium this afternoon after lunc h.     OK to discharge home if afternoon calcium is stable to rising. She should go home on QID 1000 mg calcium and BID 1 mg rocaltrol. If magnesium remains <2 she can discharge on oral magnesium as well.     Follow up with me in a week with calcium, magnesium, and PTH labs prior.    Ally Briggs MD

## 2024-02-13 NOTE — PLAN OF CARE
Goal Outcome Evaluation:      Plan of Care Reviewed With: patient, family    Overall Patient Progress: improvingOverall Patient Progress: improving     Pt A/O x4.  VSS.  Denies pain.  c/o mild tingling on upper lip.  Up Ax 1 with walker.  RLE CAM boot in place.  Tolerating PO. Plan is monitor Mg & Ca labs. Tele SR.  Will continue to monitor.

## 2024-02-13 NOTE — PROGRESS NOTES
Patient's After Visit Summary was reviewed with patient   Patient verbalized understanding of After Visit Summary, recommended follow up and was given an opportunity to ask questions. Pt has follow up with Dr. Briggs 2/23.   Discharge medications sent home with patient/family: YES, magnesium.    Discharged with daughter to home.

## 2024-02-13 NOTE — PLAN OF CARE
Vitals are Temp: 98  F (36.7  C) Temp src: Oral BP: 132/78 Pulse: 73   Resp: 16 SpO2: 96 %.  Patient is Alert and Oriented x4. They are 1 Assist with Walker.  Pt is a   Regular diet.  They are complaining of generalized pain.  Tylenol given for pain.  Patient is Saline locked. Clear breath sounds. No sob, or chest pain.

## 2024-02-13 NOTE — PROGRESS NOTES
PRIMARY DIAGNOSIS: HYPOCALCEMIA   OUTPATIENT/OBSERVATION GOALS TO BE MET BEFORE DISCHARGE:  ADLs back to baseline: Yes    Activity and level of assistance: Up with standby assistance.    Pain status: Pain free.    Return to near baseline physical activity: Yes     Discharge Planner Nurse   Safe discharge environment identified: Yes  Barriers to discharge: No       Entered by: Ally Ca RN 02/13/2024 12:20 PM  Pt is A/O x4 up with SBA and walker. CAM boot on RLE, NWB. Pt is denying any tingling sensations like she had when admitted. Ca+ 8.2 and Mag 1.5 this AM. Replaced mag and recheck Ca+ and Mag at 1230. Possibly discharge after labs resulted to home.   Please review provider order for any additional goals.   Nurse to notify provider when observation goals have been met and patient is ready for discharge.

## 2024-02-15 ENCOUNTER — PATIENT OUTREACH (OUTPATIENT)
Dept: CARE COORDINATION | Facility: CLINIC | Age: 69
End: 2024-02-15
Payer: COMMERCIAL

## 2024-02-15 ENCOUNTER — DOCUMENTATION ONLY (OUTPATIENT)
Dept: OTHER | Facility: CLINIC | Age: 69
End: 2024-02-15
Payer: COMMERCIAL

## 2024-02-15 ENCOUNTER — TELEPHONE (OUTPATIENT)
Dept: SURGERY | Facility: CLINIC | Age: 69
End: 2024-02-15
Payer: COMMERCIAL

## 2024-02-15 DIAGNOSIS — E21.3 HYPERPARATHYROIDISM (H): ICD-10-CM

## 2024-02-15 DIAGNOSIS — E83.51 HYPOCALCEMIA: Primary | ICD-10-CM

## 2024-02-15 DIAGNOSIS — Z98.890 S/P PARATHYROIDECTOMY: ICD-10-CM

## 2024-02-15 DIAGNOSIS — Z90.89 S/P PARATHYROIDECTOMY: ICD-10-CM

## 2024-02-15 RX ORDER — CALCIUM CARBONATE 500(1250)
2 TABLET ORAL 4 TIMES DAILY
Qty: 112 TABLET | Refills: 0 | Status: SHIPPED | OUTPATIENT
Start: 2024-02-15

## 2024-02-15 NOTE — TELEPHONE ENCOUNTER
S/p  Excision of left superior parathyroid adenoma   Procedure date: 2/7/24  Surgeon: Dr. Briggs        Patient with subsequent admission on 2/10/24 for hypocalcemia, hypomagnesemia.      She was discharged with instructions to increase calcium carbonate to 2 tabs (1000mg) QID.    Rocaltrol - 2 capsules (1mcg) BID.    Magnesium oxide 1 tab (400mg) Qday.     Patient was sent home with rx for the magnesium but no new prescription for calcium or rocaltrol.      She has a follow up scheduled with Dr. Briggs next week on 2/23/24.   Rabia tells me she will run out of calcium carbonate soon, but will have enough of the rocaltrol to get her through the week.      Would like rx for calcium carbonate to go to   [facility] in [place] on [address].     We also discussed that calcium may cause constipation.  She tells me she has been having BM's.  Has stool softeners as previously prescribed and will take if needed.   She is feeling well and denies any N/T.     Rx for calcium carbonate sent to [facility] in [place].

## 2024-02-15 NOTE — PROGRESS NOTES
Connected Care Resource Center Contact  [facility]/Voicemail     Clinical Data: Post-Discharge Outreach     Outreach attempted x 2.  Left message on patient's voicemail, providing [facility]'s central phone number of [phone] ([phone]) for questions/concerns and/or to schedule an appt with an [facility] provider, if they do not have a PCP.      Plan:  [facility] will do no further outreaches at this time.       THUY Whitten  [facility], [facility]    *Connected Care Resource Team does NOT follow patient ongoing. Referrals are identified based on internal discharge reports and the outreach is to ensure patient has an understanding of their discharge instructions.

## 2024-02-23 ENCOUNTER — APPOINTMENT (OUTPATIENT)
Dept: ULTRASOUND IMAGING | Facility: CLINIC | Age: 69
End: 2024-02-23
Attending: EMERGENCY MEDICINE
Payer: COMMERCIAL

## 2024-02-23 ENCOUNTER — OFFICE VISIT (OUTPATIENT)
Dept: SURGERY | Facility: CLINIC | Age: 69
End: 2024-02-23
Payer: COMMERCIAL

## 2024-02-23 ENCOUNTER — HOSPITAL ENCOUNTER (EMERGENCY)
Facility: CLINIC | Age: 69
Discharge: HOME | End: 2024-02-23
Attending: EMERGENCY MEDICINE | Admitting: EMERGENCY MEDICINE
Payer: COMMERCIAL

## 2024-02-23 ENCOUNTER — LAB (OUTPATIENT)
Dept: LAB | Facility: CLINIC | Age: 69
End: 2024-02-23
Payer: COMMERCIAL

## 2024-02-23 VITALS
TEMPERATURE: 98.4 F | SYSTOLIC BLOOD PRESSURE: 143 MMHG | HEART RATE: 77 BPM | DIASTOLIC BLOOD PRESSURE: 97 MMHG | RESPIRATION RATE: 18 BRPM | OXYGEN SATURATION: 97 % | BODY MASS INDEX: 26.43 KG/M2 | WEIGHT: 154 LBS

## 2024-02-23 VITALS
SYSTOLIC BLOOD PRESSURE: 142 MMHG | BODY MASS INDEX: 26.46 KG/M2 | OXYGEN SATURATION: 97 % | HEART RATE: 79 BPM | HEIGHT: 64 IN | RESPIRATION RATE: 16 BRPM | WEIGHT: 155 LBS | DIASTOLIC BLOOD PRESSURE: 84 MMHG

## 2024-02-23 DIAGNOSIS — E89.2 HYPOPARATHYROIDISM AFTER PROCEDURE (H): ICD-10-CM

## 2024-02-23 DIAGNOSIS — E89.2 HYPOPARATHYROIDISM AFTER PROCEDURE (H): Primary | ICD-10-CM

## 2024-02-23 DIAGNOSIS — L29.9 ITCHING: ICD-10-CM

## 2024-02-23 DIAGNOSIS — E83.52 HYPERCALCEMIA: ICD-10-CM

## 2024-02-23 DIAGNOSIS — R10.9 ABDOMINAL PAIN, UNSPECIFIED ABDOMINAL LOCATION: ICD-10-CM

## 2024-02-23 LAB
ALBUMIN SERPL BCG-MCNC: 4.4 G/DL (ref 3.5–5.2)
ALBUMIN SERPL BCG-MCNC: 4.6 G/DL (ref 3.5–5.2)
ALBUMIN UR-MCNC: NEGATIVE MG/DL
ALP SERPL-CCNC: 176 U/L (ref 40–150)
ALP SERPL-CCNC: 180 U/L (ref 40–150)
ALT SERPL W P-5'-P-CCNC: 40 U/L (ref 0–50)
ALT SERPL W P-5'-P-CCNC: 41 U/L (ref 0–50)
ANION GAP SERPL CALCULATED.3IONS-SCNC: 11 MMOL/L (ref 7–15)
ANION GAP SERPL CALCULATED.3IONS-SCNC: 13 MMOL/L (ref 7–15)
APPEARANCE UR: CLEAR
AST SERPL W P-5'-P-CCNC: 22 U/L (ref 0–45)
AST SERPL W P-5'-P-CCNC: 24 U/L (ref 0–45)
BASOPHILS # BLD AUTO: 0.1 10E3/UL (ref 0–0.2)
BASOPHILS NFR BLD AUTO: 1 %
BILIRUB SERPL-MCNC: 1.5 MG/DL
BILIRUB SERPL-MCNC: 1.9 MG/DL
BILIRUB UR QL STRIP: NEGATIVE
BUN SERPL-MCNC: 19.2 MG/DL (ref 8–23)
BUN SERPL-MCNC: 21.1 MG/DL (ref 8–23)
CA-I BLD-MCNC: 6.1 MG/DL (ref 4.4–5.2)
CALCIUM SERPL-MCNC: 12 MG/DL (ref 8.8–10.2)
CALCIUM SERPL-MCNC: 13.3 MG/DL (ref 8.8–10.2)
CHLORIDE SERPL-SCNC: 97 MMOL/L (ref 98–107)
CHLORIDE SERPL-SCNC: 97 MMOL/L (ref 98–107)
COLOR UR AUTO: NORMAL
CREAT SERPL-MCNC: 0.97 MG/DL (ref 0.51–0.95)
CREAT SERPL-MCNC: 1 MG/DL (ref 0.51–0.95)
DEPRECATED HCO3 PLAS-SCNC: 29 MMOL/L (ref 22–29)
DEPRECATED HCO3 PLAS-SCNC: 32 MMOL/L (ref 22–29)
EGFRCR SERPLBLD CKD-EPI 2021: 62 ML/MIN/1.73M2
EGFRCR SERPLBLD CKD-EPI 2021: 64 ML/MIN/1.73M2
EOSINOPHIL # BLD AUTO: 0.1 10E3/UL (ref 0–0.7)
EOSINOPHIL NFR BLD AUTO: 1 %
ERYTHROCYTE [DISTWIDTH] IN BLOOD BY AUTOMATED COUNT: 12.8 % (ref 10–15)
ERYTHROCYTE [DISTWIDTH] IN BLOOD BY AUTOMATED COUNT: 12.9 % (ref 10–15)
GLUCOSE SERPL-MCNC: 105 MG/DL (ref 70–99)
GLUCOSE SERPL-MCNC: 90 MG/DL (ref 70–99)
GLUCOSE UR STRIP-MCNC: NEGATIVE MG/DL
HCT VFR BLD AUTO: 36.1 % (ref 35–47)
HCT VFR BLD AUTO: 37.7 % (ref 35–47)
HGB BLD-MCNC: 12.3 G/DL (ref 11.7–15.7)
HGB BLD-MCNC: 12.7 G/DL (ref 11.7–15.7)
HGB UR QL STRIP: NEGATIVE
IMM GRANULOCYTES # BLD: 0 10E3/UL
IMM GRANULOCYTES NFR BLD: 1 %
KETONES UR STRIP-MCNC: NEGATIVE MG/DL
LEUKOCYTE ESTERASE UR QL STRIP: NEGATIVE
LIPASE SERPL-CCNC: 33 U/L (ref 13–60)
LYMPHOCYTES # BLD AUTO: 1.8 10E3/UL (ref 0.8–5.3)
LYMPHOCYTES NFR BLD AUTO: 28 %
MCH RBC QN AUTO: 30 PG (ref 26.5–33)
MCH RBC QN AUTO: 30 PG (ref 26.5–33)
MCHC RBC AUTO-ENTMCNC: 33.7 G/DL (ref 31.5–36.5)
MCHC RBC AUTO-ENTMCNC: 34.1 G/DL (ref 31.5–36.5)
MCV RBC AUTO: 88 FL (ref 78–100)
MCV RBC AUTO: 89 FL (ref 78–100)
MONOCYTES # BLD AUTO: 0.6 10E3/UL (ref 0–1.3)
MONOCYTES NFR BLD AUTO: 9 %
NEUTROPHILS # BLD AUTO: 4 10E3/UL (ref 1.6–8.3)
NEUTROPHILS NFR BLD AUTO: 60 %
NITRATE UR QL: NEGATIVE
NRBC # BLD AUTO: 0 10E3/UL
NRBC BLD AUTO-RTO: 0 /100
PH UR STRIP: 7 [PH] (ref 5–7)
PLATELET # BLD AUTO: 299 10E3/UL (ref 150–450)
PLATELET # BLD AUTO: 307 10E3/UL (ref 150–450)
POTASSIUM SERPL-SCNC: 3.5 MMOL/L (ref 3.4–5.3)
POTASSIUM SERPL-SCNC: 3.6 MMOL/L (ref 3.4–5.3)
PROT SERPL-MCNC: 7.2 G/DL (ref 6.4–8.3)
PROT SERPL-MCNC: 7.4 G/DL (ref 6.4–8.3)
PTH-INTACT SERPL-MCNC: 4 PG/ML (ref 15–65)
RBC # BLD AUTO: 4.1 10E6/UL (ref 3.8–5.2)
RBC # BLD AUTO: 4.24 10E6/UL (ref 3.8–5.2)
RBC URINE: 1 /HPF
SODIUM SERPL-SCNC: 139 MMOL/L (ref 135–145)
SODIUM SERPL-SCNC: 140 MMOL/L (ref 135–145)
SP GR UR STRIP: 1 (ref 1–1.03)
UROBILINOGEN UR STRIP-MCNC: NORMAL MG/DL
WBC # BLD AUTO: 6.6 10E3/UL (ref 4–11)
WBC # BLD AUTO: 7.3 10E3/UL (ref 4–11)
WBC URINE: <1 /HPF

## 2024-02-23 PROCEDURE — 83970 ASSAY OF PARATHORMONE: CPT

## 2024-02-23 PROCEDURE — 85048 AUTOMATED LEUKOCYTE COUNT: CPT

## 2024-02-23 PROCEDURE — 99024 POSTOP FOLLOW-UP VISIT: CPT | Performed by: SURGERY

## 2024-02-23 PROCEDURE — 83690 ASSAY OF LIPASE: CPT | Performed by: EMERGENCY MEDICINE

## 2024-02-23 PROCEDURE — 76700 US EXAM ABDOM COMPLETE: CPT

## 2024-02-23 PROCEDURE — 96361 HYDRATE IV INFUSION ADD-ON: CPT

## 2024-02-23 PROCEDURE — 82040 ASSAY OF SERUM ALBUMIN: CPT | Performed by: EMERGENCY MEDICINE

## 2024-02-23 PROCEDURE — 84155 ASSAY OF PROTEIN SERUM: CPT | Performed by: EMERGENCY MEDICINE

## 2024-02-23 PROCEDURE — 81001 URINALYSIS AUTO W/SCOPE: CPT | Performed by: EMERGENCY MEDICINE

## 2024-02-23 PROCEDURE — 96360 HYDRATION IV INFUSION INIT: CPT

## 2024-02-23 PROCEDURE — 85027 COMPLETE CBC AUTOMATED: CPT | Performed by: EMERGENCY MEDICINE

## 2024-02-23 PROCEDURE — 99284 EMERGENCY DEPT VISIT MOD MDM: CPT | Mod: 25

## 2024-02-23 PROCEDURE — 80053 COMPREHEN METABOLIC PANEL: CPT

## 2024-02-23 PROCEDURE — 258N000003 HC RX IP 258 OP 636: Performed by: EMERGENCY MEDICINE

## 2024-02-23 PROCEDURE — 36415 COLL VENOUS BLD VENIPUNCTURE: CPT

## 2024-02-23 PROCEDURE — 36415 COLL VENOUS BLD VENIPUNCTURE: CPT | Performed by: EMERGENCY MEDICINE

## 2024-02-23 PROCEDURE — 82330 ASSAY OF CALCIUM: CPT | Performed by: EMERGENCY MEDICINE

## 2024-02-23 RX ADMIN — SODIUM CHLORIDE 1000 ML: 9 INJECTION, SOLUTION INTRAVENOUS at 15:11

## 2024-02-23 RX ADMIN — SODIUM CHLORIDE 1000 ML: 9 INJECTION, SOLUTION INTRAVENOUS at 17:16

## 2024-02-23 ASSESSMENT — ACTIVITIES OF DAILY LIVING (ADL)
ADLS_ACUITY_SCORE: 38

## 2024-02-23 ASSESSMENT — COLUMBIA-SUICIDE SEVERITY RATING SCALE - C-SSRS
6. HAVE YOU EVER DONE ANYTHING, STARTED TO DO ANYTHING, OR PREPARED TO DO ANYTHING TO END YOUR LIFE?: NO
2. HAVE YOU ACTUALLY HAD ANY THOUGHTS OF KILLING YOURSELF IN THE PAST MONTH?: NO
1. IN THE PAST MONTH, HAVE YOU WISHED YOU WERE DEAD OR WISHED YOU COULD GO TO SLEEP AND NOT WAKE UP?: NO

## 2024-02-23 NOTE — PROGRESS NOTES
"Progress Note/Post-op Follow up:  Nila is here for follow up after subtotal parathyroidectomy 16 days ago. She had 3 parathyroid glands completely removed (218 mg, 394 mg, 70 mg) and the right inferior gland was biopsied and left in situ.  She had post-operative hypoparathyroidism and was readmitted with paresthesias on POD#3. Her supplementation was increased to 1000 calcium carbonate QID and rocatrol 1 mcg BID and she was discharged after 2 days.     She presents for scheduled follow up today, but reports that she has not been feeling well. She doesn't have paresthesias anymore, but she has had abdominal pain, nausea, diminished appetite, fatigue, and pruritus.      He continues to have normal bowel movements and hasn't felt constipated even with her high dose calcium supplementation. He abdominal pain is epigastric.     Physical Exam:  BP (!) 142/84   Pulse 79   Resp 16   Ht 1.626 m (5' 4\")   Wt 70.3 kg (155 lb)   SpO2 97%   BMI 26.61 kg/m    General:  Appears well, in no acute distress  HEENT:  Chvostek's sign is negative.   Neck:  Incision site healing nicely, Healing ridge is minimal             Range of motion is normal.  Neuro:  Voice is Normal.    Pathology:  Pathology was reviewed with the patient.   Bilateral superior and left intrathymic inferior parathyroid glands removed (218 mg, 394 mg, 70 mg)  Right inferior gland biopsied (5 mg fragment)      Assessment and Plan:  Nila is recovering from subtotal parathyroidectomy.  I had her go for laboratory evaluation given her new onset of abdominal symptoms and this has revealed mild hyperbilirubinemia as well as mild acute kidney injury and hypercalcemia up to 13.3.  PTH remains low at 4, which is difficult to know whether this is due to continued iatrogenic hypoparathyroidism from surgery vs. recovered parathyroid with expected suppression from such high calcium levels.     I have recommended that she stop her calcium and rocaltrol supplements " immediately and go to the ED for fluids and abdominal ultrasound.  She should be admitted for further surveillance/treatment of her hypercalcemia, DEVAN, and LFT abnormalities. She would prefer to go to [facility] as she is currently dependant on her daughters for transportation with her broken ankle.     She should return to clinic in ~ 1 week.     Ally Briggs MD  Please route or send letter to:  Primary Care Provider (PCP)  Shania Moon MD

## 2024-02-23 NOTE — ED PROVIDER NOTES
History     Chief Complaint:  Abdominal Pain and Abnormal Labs       HPI   Nila Viveros is a 68 year old female who presents with abdominal pain and nausea with fatigue and itchiness and is a referral from her general surgeon.  She is 16 days out from a subtotal parathyroidectomy and her postoperative course was complicated by having to be readmitted 3 days after the surgery for paresthesias.  Her calcium supplementation was increased it sounds like.    She states that her pain is in the epigastric area, comes and goes, seems to be worse after she eats.      Has been having normal bowel movements, denies constipation.  Her abdominal discomfort is in the epigastric region.      Independent Historian:   Yes, patient's 2 daughters at the bedside and confirm the above history.    Review of External Notes: Yes I have reviewed the patient's office visit at her general surgeon's clinic earlier today with the patient was seen for hypoparathyroidism after her procedure.      Allergies:  Azithromycin  Bee Venom  Zinc  Nickel  Silver     Medications:    acetaminophen (TYLENOL) 500 MG tablet  calcitRIOL (ROCALTROL) 0.5 MCG capsule  calcium carbonate (OS-JARRETT) 500 MG tablet  cholecalciferol (VITAMIN D3) 125 mcg (5000 units) capsule  EPINEPHrine (ANY BX GENERIC EQUIV) 0.3 MG/0.3ML injection 2-pack  FLUTICASONE PROPIONATE, NASAL, NA  lisinopril (ZESTRIL) 20 MG tablet  magnesium oxide (MAG-OX) 400 MG tablet  melatonin 3 MG tablet  Misc Natural Products (OSTEO BI-FLEX ADV JOINT SHIELD) TABS  ondansetron (ZOFRAN) 4 MG tablet  oxyCODONE (ROXICODONE) 5 MG tablet  Probiotic Product (PROBIOTIC DAILY) CAPS  senna-docusate (SENOKOT-S/PERICOLACE) 8.6-50 MG tablet  Ubiquinol 100 MG CAPS  valACYclovir (VALTREX) 1000 mg tablet        Past Medical History:    Past Medical History:   Diagnosis Date    Degenerative arthritis of knee, bilateral     Giardia     H/O vein stripping 2006    Hx of prior ablation treatment 2008    Hypertension      Infection due to 2019 novel coronavirus     Knee mass, right     Pap smear for cervical cancer screening     S/P LEEP     S/P right knee arthroscopy     S/P tubal ligation 1988       Past Surgical History:    Past Surgical History:   Procedure Laterality Date    ARTHROPLASTY KNEE Right 10/3/2023    Procedure: RIGHT TOTAL KNEE ARTHROPLASTY;  Surgeon: Darshan Caban MD;  Location: [facility] OR    PARATHYROIDECTOMY N/A 2/7/2024    Procedure: SUBTOTAL PARATHYROIDECTOMY;  Surgeon: Ally Briggs MD;  Location: [facility] OR        Family History:    family history is not on file.    Social History:   reports that she has never smoked. She has never been exposed to tobacco smoke. She has never used smokeless tobacco. She reports current alcohol use. She reports that she does not use drugs.  PCP: Akanksha Euceda     Physical Exam   Patient Vitals for the past 24 hrs:   BP Temp Temp src Pulse Resp SpO2 Weight   02/23/24 1432 (!) 173/112 98.4  F (36.9  C) Temporal 96 18 98 % 69.9 kg (154 lb)        Physical Exam  Vitals: reviewed by me  General: Pt seen on [facility], cooperative, and alert to conversation  Eyes: Tracking well, clear conjunctiva BL  ENT: MMM, midline trachea.   Lungs: No tachypnea, no accessory muscle use. No respiratory distress.   CV: Rate as above  Abd: Soft, non tender, no guarding, no rebound. Non distended  Specifically no right upper quadrant tenderness to palpation, negative Fan's sign.  MSK: no joint effusion.  No evidence of trauma  Skin: No rash  Neuro: Clear speech and no facial droop.  Psych: Not RIS, no e/o AH/VH          Emergency Department Course         Imaging:  US Abdomen Complete   Final Result   IMPRESSION:   1.  Negative complete abdominal ultrasound.               Report per radiology    Laboratory:  Labs Ordered and Resulted from Time of ED Arrival to Time of ED Departure   COMPREHENSIVE METABOLIC PANEL - Abnormal       Result Value    Sodium 139      Potassium 3.5       Carbon Dioxide (CO2) 29      Anion Gap 13      Urea Nitrogen 19.2      Creatinine 0.97 (*)     GFR Estimate 64      Calcium 12.0 (*)     Chloride 97 (*)     Glucose 90      Alkaline Phosphatase 180 (*)     AST 22      ALT 40      Protein Total 7.2      Albumin 4.4      Bilirubin Total 1.5 (*)    IONIZED CALCIUM - Abnormal    Calcium Ionized Whole Blood 6.1 (*)    LIPASE - Normal    Lipase 33     ROUTINE UA WITH MICROSCOPIC REFLEX TO CULTURE - Normal    Color Urine Straw      Appearance Urine Clear      Glucose Urine Negative      Bilirubin Urine Negative      Ketones Urine Negative      Specific Gravity Urine 1.003      Blood Urine Negative      pH Urine 7.0      Protein Albumin Urine Negative      Urobilinogen Urine Normal      Nitrite Urine Negative      Leukocyte Esterase Urine Negative      RBC Urine 1      WBC Urine <1     CBC WITH PLATELETS AND DIFFERENTIAL    WBC Count 6.6      RBC Count 4.10      Hemoglobin 12.3      Hematocrit 36.1      MCV 88      MCH 30.0      MCHC 34.1      RDW 12.8      Platelet Count 307      % Neutrophils 60      % Lymphocytes 28      % Monocytes 9      % Eosinophils 1      % Basophils 1      % Immature Granulocytes 1      NRBCs per 100 WBC 0      Absolute Neutrophils 4.0      Absolute Lymphocytes 1.8      Absolute Monocytes 0.6      Absolute Eosinophils 0.1      Absolute Basophils 0.1      Absolute Immature Granulocytes 0.0      Absolute NRBCs 0.0              Emergency Department Course & Assessments:             Interventions:  Medications   sodium chloride 0.9% BOLUS 1,000 mL (has no administration in time range)   sodium chloride 0.9% BOLUS 1,000 mL (has no administration in time range)          Disposition:  The patient was discharged to home.     Impression & Plan        Medical Decision Making:  This is a very pleasant 68-year-old female who presents to the emergency room with what appears to be hypercalcemia causing some abdominal pain.  I do appreciate that her bilirubin was  slightly elevated, though it seems to be coming down now, and she has a normal right upper quadrant ultrasound.  Given her hypocalcemia I also considered possible renal stones as a cause of her intermittent pain, but thankfully there is no evidence of hydro on her renal ultrasound, and no hematuria on her urinalysis.  Her abdomen is completely benign here and there is no tenderness.  After her IV fluid she states that she feels well enough to go home and is asking to go home which I do think is reasonable.  It does look like her calcium has peaked possibly yesterday, and unfortunately there was not an ionized calcium drawn yesterday to compare it to.  Trending from 13 down to 12, I have asked the patient to not take any of her calcium supplementation tomorrow, but then to  at 50% to 75% of her prescribed dose of calcium starting on Sunday, namely in 2 days.  I would prefer that she is to talk to her surgeon that is managing this directly, and they will try and do this tomorrow, but being the weekend we did develop this backup plan.  Red flags for when to come back to the ER for either hyper or hypocalcemia were discussed in detail and patient and family seem to feel comfortable with this plan    Lasix was discussed briefly, but given the patient's history of hypocalcemia not so far in the past, we deferred and using shared decision making decided against this intervention.    Diagnosis:    ICD-10-CM    1. Hypercalcemia  E83.52       2. Abdominal pain, unspecified abdominal location  R10.9            Discharge Medications:  New Prescriptions    No medications on file          2/23/2024   Rafal Grigsby*        Rafal Grigsby MD  02/23/24 0808

## 2024-02-23 NOTE — DISCHARGE INSTRUCTIONS
As we discussed, you did receive 2 L of fluid here, and you took only 1 dose of your calcium supplementation today, Friday.  It does look like your calcium peaked yesterday as today it is come down from 13-12, and I would recommend that you do not take any of your calcium supplementation tomorrow, and then I would begin your normal dose on Sunday taking between 2 or 3 of your calcium doses per day.  Ideally you can get a hold of your surgeon that is managing her calcium Marlin issues tomorrow and have him or her way and before Monday.  Regardless to see your regular surgeon on Monday first thing in the morning.  Come back to the ER immediately with any other concerns you have, any facial twitching, worsening pain or any new symptoms that you have.

## 2024-02-23 NOTE — ED TRIAGE NOTES
Pt had parathyroid surgery 2 weeks ago. Was in hospital last sat-tues for complications. Saw surgeon this AM and had labs done and her Calcium is 13.5 and parathyroid very low. Liver enzymes off. Primary wanted her liver ultrasounded and to get fluids

## 2024-02-27 DIAGNOSIS — E89.2 HYPOPARATHYROIDISM AFTER PROCEDURE (H): Primary | ICD-10-CM

## 2024-03-08 ENCOUNTER — THERAPY VISIT (OUTPATIENT)
Dept: PHYSICAL THERAPY | Facility: CLINIC | Age: 69
End: 2024-03-08
Payer: COMMERCIAL

## 2024-03-08 DIAGNOSIS — M25.571 ACUTE RIGHT ANKLE PAIN: ICD-10-CM

## 2024-03-08 DIAGNOSIS — R26.9 ABNORMAL GAIT: ICD-10-CM

## 2024-03-08 DIAGNOSIS — S82.201A TIBIA/FIBULA FRACTURE, RIGHT, CLOSED, INITIAL ENCOUNTER: Primary | ICD-10-CM

## 2024-03-08 DIAGNOSIS — S82.401A TIBIA/FIBULA FRACTURE, RIGHT, CLOSED, INITIAL ENCOUNTER: Primary | ICD-10-CM

## 2024-03-08 PROCEDURE — 97161 PT EVAL LOW COMPLEX 20 MIN: CPT | Mod: GP | Performed by: PHYSICAL THERAPIST

## 2024-03-08 PROCEDURE — 97110 THERAPEUTIC EXERCISES: CPT | Mod: GP | Performed by: PHYSICAL THERAPIST

## 2024-03-08 NOTE — PROGRESS NOTES
PHYSICAL THERAPY EVALUATION  Type of Visit: Evaluation  Patient reports slipping on black ice at home on 01/23/2024 twisting her R knee and fracturing her R fibula.  She was in a boot for 6 weeks --first 3 weeks NWB and then WBAT and just got out of it the yesterday.   She has been using a cane for ambulation since getting out of her boot yesterday.     See electronic medical record for Abuse and Falls Screening details.    Subjective       Presenting condition or subjective complaint: R broken fibula and twisted ankle and knee  Date of onset:      Relevant medical history:     Past Medical History:   Diagnosis Date    Degenerative arthritis of knee, bilateral     Giardia     has intermittent flares of diarrhea    H/O vein stripping 2006    Right leg    Hx of prior ablation treatment 2008    Lap ablation for uterine fibroids    Hypertension     Infection due to 2019 novel coronavirus     Knee mass, right     Pap smear for cervical cancer screening     S/P LEEP     S/P right knee arthroscopy     S/P tubal ligation 1988       Dates & types of surgery:    Past Surgical History:   Procedure Laterality Date    ARTHROPLASTY KNEE Right 10/3/2023    Procedure: RIGHT TOTAL KNEE ARTHROPLASTY;  Surgeon: Darshan Caban MD;  Location: [facility] OR    PARATHYROIDECTOMY N/A 2/7/2024    Procedure: SUBTOTAL PARATHYROIDECTOMY;  Surgeon: Ally Briggs MD;  Location: [facility] OR     Current Outpatient Medications   Medication    acetaminophen (TYLENOL) 500 MG tablet    calcitRIOL (ROCALTROL) 0.5 MCG capsule    calcium carbonate (OS-JARRETT) 500 MG tablet    cholecalciferol (VITAMIN D3) 125 mcg (5000 units) capsule    EPINEPHrine (ANY BX GENERIC EQUIV) 0.3 MG/0.3ML injection 2-pack    FLUTICASONE PROPIONATE, NASAL, NA    lisinopril (ZESTRIL) 20 MG tablet    magnesium oxide (MAG-OX) 400 MG tablet    melatonin 3 MG tablet    Misc Natural Products (OSTEO BI-FLEX ADV JOINT SHIELD) TABS    ondansetron (ZOFRAN) 4 MG tablet    oxyCODONE (ROXICODONE)  5 MG tablet    Probiotic Product (PROBIOTIC DAILY) CAPS    senna-docusate (SENOKOT-S/PERICOLACE) 8.6-50 MG tablet    Ubiquinol 100 MG CAPS    valACYclovir (VALTREX) 1000 mg tablet     No current facility-administered medications for this visit.           Prior diagnostic imaging/testing results: X-ray     Prior therapy history for the same diagnosis, illness or injury: No      Prior Level of Function  Transfers: Independent  Ambulation: Independent  ADL: Independent  IADL:     Living Environment  Social support: Alone   Type of home: 2-story   Stairs to enter the home: Yes   Is there a railing: Yes   Ramp: No   Stairs inside the home: Yes 13     Help at home: Assist for driving and community activities  Equipment owned: Straight Cane; Walker with wheels; Crutches (scooter)     Employment:   no  Hobbies/Interests: gardening, hiking, pickleball, crafts    Patient goals for therapy: improve walking, decrease inflammation    Pain assessment: Location: R lateral ankle/Ratin/10     Objective   FOOT/ANKLE EVALUATION  PAIN: Pain Level at Rest: 0/10  Pain Level with Use: 5/10  Pain Location: ankle and RIGHT  Pain Quality: Aching, Sharp, and stiff  Pain Frequency: intermittent  Pain is Worst: later in the day  Pain is Exacerbated By: walking--uses SEC and limited to 2 blocks, stairs--non-reciprocal with cane and railing  Pain is Relieved By: cold and rest  Pain Progression: Improved  INTEGUMENTARY (edema, incisions): mild swelling noted in R lateral ankle and less on medial ankle  POSTURE:   GAIT:   Weightbearing Status: WBAT  Assistive Device(s): Cane (single end)  Gait Deviations: decreased R pushoff, decreased R stance time, decreased silvestre, uses SEC, early heel off R, decreased weight transfer to forefoot R  BALANCE/PROPRIOCEPTION:   WEIGHT BEARING ALIGNMENT:   NON-WEIGHTBEARING ALIGNMENT:    ROM:  R ankle AROM:  DF 10 degrees ; PF 24 degrees; inversion 8 degrees, eversion 10 degrees; PROM:  DF 13 degrees, PF 28  degrees, Inversion 10 degrees, eversion 11 degrees    STRENGTH: not tested due to injury--decreased strength noted with gait  FLEXIBILITY:   SPECIAL TESTS:   FUNCTIONAL TESTS:   PALPATION: mild tenderness R lateral ankle around malleolus  JOINT MOBILITY:     Assessment & Plan   CLINICAL IMPRESSIONS  Medical Diagnosis:    s/p R fibula fracture  Treatment Diagnosis:   R fibula fracture, R ankle pain, abnormal gait  Impression/Assessment: Patient is a 68 year old female with R ankle  complaints s/p fibular fracture 01/23/2024.  The following significant findings have been identified: Pain, Decreased ROM/flexibility, Decreased strength, Impaired gait, and Decreased activity tolerance. These impairments interfere with their ability to perform self care tasks, recreational activities, household chores, household mobility, and community mobility as compared to previous level of function.     Clinical Decision Making (Complexity):  Clinical Presentation: Stable/Uncomplicated  Clinical Presentation Rationale: based on medical and personal factors listed in PT evaluation  Clinical Decision Making (Complexity): Low complexity    PLAN OF CARE  Treatment Interventions:  Interventions: Gait Training, Manual Therapy, Neuromuscular Re-education, Therapeutic Activity, Therapeutic Exercise    Long Term Goals      Patient will be able to ambulate 8 blocks without an assistive device to maximize safety and independence within the community.  Target date 05/03/2024.  Currently ambulating 2 blocks with SEC      Frequency of Treatment:  1x/week   Duration of Treatment:  8 weeks    Recommended Referrals to Other Professionals: none  Education Assessment:    Learner/Method--Patient, listening, reading, demonstration, pictures, no barriers to learning     Risks and benefits of evaluation/treatment have been explained.   Patient/Family/caregiver agrees with Plan of Care.     Evaluation Time:      Timed code treatment minutes 18  Total  Treatment time: 33 min       Signing Clinician: Camille Gallegos PT      [facility]                                                                                   OUTPATIENT PHYSICAL THERAPY      PLAN OF TREATMENT FOR OUTPATIENT REHABILITATION   Patient's Last Name, First Name, Nila Lizama YOB: 1955   Provider's Name   [facility]   Medical Record No.  [id number]     Onset Date:   01/23/2024 Start of Care Date:  03/08/2024     Medical Diagnosis:   s/p R fibula fracture      PT Treatment Diagnosis:   R fibula fracture, R ankle pain, abnormal gait Plan of Treatment  Frequency/Duration:  1x/week /  8 weeks  Certification date from  03/08/2024 to  05/03/2024         See note for plan of treatment details and functional goals     Camille Gallegos PT                         I CERTIFY THE NEED FOR THESE SERVICES FURNISHED UNDER        THIS PLAN OF TREATMENT AND WHILE UNDER MY CARE .             Physician Signature               Date    X_____________________________________________________                  Referring Provider  Dami Hebert MD    Initial Assessment  See Epic Evaluation-

## 2024-03-12 ENCOUNTER — TRANSCRIBE ORDERS (OUTPATIENT)
Dept: OTHER | Age: 69
End: 2024-03-12

## 2024-03-21 ENCOUNTER — THERAPY VISIT (OUTPATIENT)
Dept: PHYSICAL THERAPY | Facility: CLINIC | Age: 69
End: 2024-03-21
Payer: COMMERCIAL

## 2024-03-21 DIAGNOSIS — M25.571 ACUTE RIGHT ANKLE PAIN: ICD-10-CM

## 2024-03-21 DIAGNOSIS — S82.201A TIBIA/FIBULA FRACTURE, RIGHT, CLOSED, INITIAL ENCOUNTER: ICD-10-CM

## 2024-03-21 DIAGNOSIS — S82.401A TIBIA/FIBULA FRACTURE, RIGHT, CLOSED, INITIAL ENCOUNTER: ICD-10-CM

## 2024-03-21 DIAGNOSIS — R26.9 ABNORMAL GAIT: Primary | ICD-10-CM

## 2024-03-21 PROCEDURE — 97110 THERAPEUTIC EXERCISES: CPT | Mod: GP | Performed by: PHYSICAL THERAPIST

## 2024-03-27 ENCOUNTER — THERAPY VISIT (OUTPATIENT)
Dept: PHYSICAL THERAPY | Facility: CLINIC | Age: 69
End: 2024-03-27
Payer: COMMERCIAL

## 2024-03-27 DIAGNOSIS — S82.401A TIBIA/FIBULA FRACTURE, RIGHT, CLOSED, INITIAL ENCOUNTER: ICD-10-CM

## 2024-03-27 DIAGNOSIS — R26.9 ABNORMAL GAIT: Primary | ICD-10-CM

## 2024-03-27 DIAGNOSIS — M25.571 ACUTE RIGHT ANKLE PAIN: ICD-10-CM

## 2024-03-27 DIAGNOSIS — S82.201A TIBIA/FIBULA FRACTURE, RIGHT, CLOSED, INITIAL ENCOUNTER: ICD-10-CM

## 2024-03-27 PROCEDURE — 97110 THERAPEUTIC EXERCISES: CPT | Mod: GP | Performed by: PHYSICAL THERAPIST

## 2024-03-27 PROCEDURE — 97140 MANUAL THERAPY 1/> REGIONS: CPT | Mod: GP | Performed by: PHYSICAL THERAPIST

## 2024-04-03 ENCOUNTER — THERAPY VISIT (OUTPATIENT)
Dept: PHYSICAL THERAPY | Facility: CLINIC | Age: 69
End: 2024-04-03
Payer: COMMERCIAL

## 2024-04-03 DIAGNOSIS — M25.571 ACUTE RIGHT ANKLE PAIN: ICD-10-CM

## 2024-04-03 DIAGNOSIS — R26.9 ABNORMAL GAIT: Primary | ICD-10-CM

## 2024-04-03 DIAGNOSIS — S82.401A TIBIA/FIBULA FRACTURE, RIGHT, CLOSED, INITIAL ENCOUNTER: ICD-10-CM

## 2024-04-03 DIAGNOSIS — S82.201A TIBIA/FIBULA FRACTURE, RIGHT, CLOSED, INITIAL ENCOUNTER: ICD-10-CM

## 2024-04-03 PROCEDURE — 97140 MANUAL THERAPY 1/> REGIONS: CPT | Mod: GP | Performed by: PHYSICAL THERAPIST

## 2024-04-03 PROCEDURE — 97530 THERAPEUTIC ACTIVITIES: CPT | Mod: GP | Performed by: PHYSICAL THERAPIST

## 2024-04-03 PROCEDURE — 97110 THERAPEUTIC EXERCISES: CPT | Mod: GP | Performed by: PHYSICAL THERAPIST

## 2024-04-09 ENCOUNTER — THERAPY VISIT (OUTPATIENT)
Dept: PHYSICAL THERAPY | Facility: CLINIC | Age: 69
End: 2024-04-09
Payer: COMMERCIAL

## 2024-04-09 DIAGNOSIS — S82.201A TIBIA/FIBULA FRACTURE, RIGHT, CLOSED, INITIAL ENCOUNTER: ICD-10-CM

## 2024-04-09 DIAGNOSIS — M25.571 ACUTE RIGHT ANKLE PAIN: ICD-10-CM

## 2024-04-09 DIAGNOSIS — S82.401A TIBIA/FIBULA FRACTURE, RIGHT, CLOSED, INITIAL ENCOUNTER: ICD-10-CM

## 2024-04-09 DIAGNOSIS — R26.9 ABNORMAL GAIT: Primary | ICD-10-CM

## 2024-04-09 PROCEDURE — 97112 NEUROMUSCULAR REEDUCATION: CPT | Mod: GP | Performed by: PHYSICAL THERAPIST

## 2024-04-09 PROCEDURE — 97110 THERAPEUTIC EXERCISES: CPT | Mod: GP | Performed by: PHYSICAL THERAPIST

## 2024-04-09 PROCEDURE — 97140 MANUAL THERAPY 1/> REGIONS: CPT | Mod: GP | Performed by: PHYSICAL THERAPIST

## 2024-04-23 ENCOUNTER — THERAPY VISIT (OUTPATIENT)
Dept: PHYSICAL THERAPY | Facility: CLINIC | Age: 69
End: 2024-04-23
Payer: COMMERCIAL

## 2024-04-23 DIAGNOSIS — S82.401A TIBIA/FIBULA FRACTURE, RIGHT, CLOSED, INITIAL ENCOUNTER: ICD-10-CM

## 2024-04-23 DIAGNOSIS — S82.201A TIBIA/FIBULA FRACTURE, RIGHT, CLOSED, INITIAL ENCOUNTER: ICD-10-CM

## 2024-04-23 DIAGNOSIS — M25.571 ACUTE RIGHT ANKLE PAIN: ICD-10-CM

## 2024-04-23 DIAGNOSIS — R26.9 ABNORMAL GAIT: Primary | ICD-10-CM

## 2024-04-23 PROCEDURE — 97530 THERAPEUTIC ACTIVITIES: CPT | Mod: GP | Performed by: PHYSICAL THERAPIST

## 2024-04-23 PROCEDURE — 97110 THERAPEUTIC EXERCISES: CPT | Mod: GP | Performed by: PHYSICAL THERAPIST

## 2024-04-23 PROCEDURE — 97140 MANUAL THERAPY 1/> REGIONS: CPT | Mod: GP | Performed by: PHYSICAL THERAPIST

## 2024-04-30 ENCOUNTER — THERAPY VISIT (OUTPATIENT)
Dept: PHYSICAL THERAPY | Facility: CLINIC | Age: 69
End: 2024-04-30
Payer: COMMERCIAL

## 2024-04-30 DIAGNOSIS — S82.201A TIBIA/FIBULA FRACTURE, RIGHT, CLOSED, INITIAL ENCOUNTER: ICD-10-CM

## 2024-04-30 DIAGNOSIS — S82.401A TIBIA/FIBULA FRACTURE, RIGHT, CLOSED, INITIAL ENCOUNTER: ICD-10-CM

## 2024-04-30 DIAGNOSIS — R26.9 ABNORMAL GAIT: Primary | ICD-10-CM

## 2024-04-30 DIAGNOSIS — M25.571 ACUTE RIGHT ANKLE PAIN: ICD-10-CM

## 2024-04-30 PROCEDURE — 97112 NEUROMUSCULAR REEDUCATION: CPT | Mod: GP | Performed by: PHYSICAL THERAPIST

## 2024-04-30 PROCEDURE — 97110 THERAPEUTIC EXERCISES: CPT | Mod: GP | Performed by: PHYSICAL THERAPIST

## 2024-04-30 PROCEDURE — 97140 MANUAL THERAPY 1/> REGIONS: CPT | Mod: GP | Performed by: PHYSICAL THERAPIST

## 2024-05-07 ENCOUNTER — THERAPY VISIT (OUTPATIENT)
Dept: PHYSICAL THERAPY | Facility: CLINIC | Age: 69
End: 2024-05-07
Payer: COMMERCIAL

## 2024-05-07 DIAGNOSIS — M25.571 ACUTE RIGHT ANKLE PAIN: ICD-10-CM

## 2024-05-07 DIAGNOSIS — S82.201A TIBIA/FIBULA FRACTURE, RIGHT, CLOSED, INITIAL ENCOUNTER: ICD-10-CM

## 2024-05-07 DIAGNOSIS — R26.9 ABNORMAL GAIT: Primary | ICD-10-CM

## 2024-05-07 DIAGNOSIS — S82.401A TIBIA/FIBULA FRACTURE, RIGHT, CLOSED, INITIAL ENCOUNTER: ICD-10-CM

## 2024-05-07 PROCEDURE — 97140 MANUAL THERAPY 1/> REGIONS: CPT | Mod: GP | Performed by: PHYSICAL THERAPIST

## 2024-05-07 PROCEDURE — 97110 THERAPEUTIC EXERCISES: CPT | Mod: GP | Performed by: PHYSICAL THERAPIST

## 2024-05-07 NOTE — PROGRESS NOTES
[facility]                                                                                   OUTPATIENT PHYSICAL THERAPY    PLAN OF TREATMENT FOR OUTPATIENT REHABILITATION   Patient's Last Name, First Name, Nila Lizama YOB: 1955   Provider's Name   [facility]   Medical Record No.  [id number]     Onset Date: 01/23/24  Start of Care Date: 03/08/24     Medical Diagnosis:  s/p R fibula fracture      PT Treatment Diagnosis:  R fibula fracture, R ankle pain; abnormal gait Plan of Treatment  Frequency/Duration: 1x/week/ 6 additional weeks.    Certification date from 05/07/24 to 06/17/24         See note for plan of treatment details and functional goals     Camille Gallegos PT                         I CERTIFY THE NEED FOR THESE SERVICES FURNISHED UNDER        THIS PLAN OF TREATMENT AND WHILE UNDER MY CARE .             Physician Signature               Date    X_____________________________________________________                  Referring Provider:  Dami Hebert MD    Initial Assessment  See Epic Evaluation- Start of Care Date: 03/08/24            PLAN  Continue therapy per current plan of care.    Beginning/End Dates of Progress Note Reporting Period:  03/08/24 to 05/07/2024    Referring Provider:  Dami Hebert MD     05/07/24 0500   Appointment Info   Signing clinician's name / credentials Camille Gallegos PT   Total/Authorized Visits 13 (PT estimate updated on PN dated 05/07/2024)   Visits Used 7   Medical Diagnosis s/p R fibula fracture   PT Tx Diagnosis R fibula fracture, R ankle pain; abnormal gait   Other pertinent information 8' late   Quick Adds Certification   Progress Note/Certification   Start of Care Date 03/08/24   Onset of illness/injury or Date of Surgery 01/23/24   Therapy Frequency 1x/week   Predicted Duration 6 additional weeks.   Certification date from 05/07/24   Certification date to 06/17/24   Progress  "Note Due Date 05/03/24   Progress Note Completed Date 03/08/24   GOALS   PT Goals 2   PT Goal 1   Goal Identifier ambulation   Goal Description Patient will be able to ambulate 8 blocks without an assistive device   Rationale to maximize safety and independence within the community   Goal Progress Able to walk 1 mile.   Target Date 05/03/24   Date Met 05/07/24   PT Goal 2   Goal Identifier stairs   Goal Description Patient will report no pain in R foot/ankle with stairs   Rationale to maximize safety and independence within the community   Goal Progress Currently 3/10 pain with   Target Date 06/17/24   Subjective Report   Subjective Report Patient reports walking about 1 mile yesterday with one break due to being achy.  She notices weakness walking downhill on her driveway.  She also notices weakness on the stairs and mild soreness in the R foot 3/10 pain.   Objective Measures   Objective Measures Objective Measure 1;Objective Measure 2;Objective Measure 3   Objective Measure 1   Objective Measure AROM R ankle   Details PF=53 degrees   Objective Measure 2   Objective Measure gait   Details near normal, slight decrease weight transfer to forefoot stili   Objective Measure 3   Objective Measure R knee ROM   Treatment Interventions (PT)   Interventions Therapeutic Procedure/Exercise;Therapeutic Activity;Manual Therapy;Neuromuscular Re-education   Therapeutic Procedure/Exercise   Therapeutic Procedures: strength, endurance, ROM, flexibility minutes (69500) 14   Therapeutic Procedures Ther Proc 2;Ther Proc 3;Ther Proc 4;Ther Proc 5;Ther Proc 6;Ther Proc 7;Ther Proc 8;Ther Proc 9;Ther Proc 10   Ther Proc 3 PF stretch   Ther Proc 3 - Details seated, foot under chair, use hand to push into PF x15 reps--decreased pain forefoot with PF after   Ther Proc 4 inversion stretching   Ther Proc 4 - Details sitting, R ankle crossed over L knee x15 reps--\"feels good\" --continue   Ther Proc 6 gastroc stretch--standing   Ther Proc 6 - " Details HEP   Ther Proc 7 eccentric calf raises gastroc   Ther Proc 7 - Details x7' spent on form--up on 2 feet, shift as much to R as possible on way down--VC, VSC for form   Ther Proc 8 NuStep   Ther Proc 8 - Details no time   Ther Proc 9 TR--on 2 feet   Ther Proc 9 - Details x15 reps--moving to eccentrics, tried single-leg--too difficult, very limited ROM   Ther Proc 10 TR for peroneals   Ther Proc 10 - Details leaning weight on R wall--review--continuing   Skilled Intervention VC, VSC for form   Patient Response/Progress improving ROM and strength   Neuromuscular Re-education   Neuromuscular Re-education Neuro Re-ed 2;Neuro Re-ed 3;Neuro Re-ed 4   Neuro Re-ed 1 tandem stance   Neuro Re-ed 1 - Details no time   Neuro Re-ed 2 SLS   Neuro Re-ed 2 - Details x4' total--VC, VSC for form   Neuro Re-ed 3 ankle quarters   Neuro Re-ed 3 - Details yellow TB--HEP--review--continue at home, discussed importance for strength and balance--x2' spent   Neuro Re-ed 4 balance board   Neuro Re-ed 4 - Details no time   Manual Therapy   Manual Therapy: Mobilization, MFR, MLD, friction massage minutes (07479) 16   Manual Therapy Manual Therapy 2;Manual Therapy 3;Manual Therapy 4;Manual Therapy 6;Manual Therapy 5   Manual Therapy 1 x8 min   Manual Therapy 1 - Details prone anterior TCJ mobs for PF, gr II-III x 8 min   Manual Therapy 2 prone ankle joint distraction   Manual Therapy 3 prone intermetatarsal joint mobs   Manual Therapy 3 - Details not today   Manual Therapy 4 prone R knee manual stretch with mobs for extension   Manual Therapy 4 - Details x5'   Skilled Intervention hands-on technique   Patient Response/Progress decreased pain TR again after mobs   Manual Therapy 5 manual PF stretch   Manual Therapy 5 - Details x3' R, prone   Education   Learner/Method Patient;Listening;Reading;Demonstration;Pictures/Video;No Barriers to Learning   Plan   Plan for next session continue current focus on mobility, strength, proprioception    Total Session Time   Timed Code Treatment Minutes 30   Total Treatment Time (sum of timed and untimed services) 30

## 2024-05-23 ENCOUNTER — THERAPY VISIT (OUTPATIENT)
Dept: PHYSICAL THERAPY | Facility: CLINIC | Age: 69
End: 2024-05-23
Payer: COMMERCIAL

## 2024-05-23 DIAGNOSIS — S82.401A TIBIA/FIBULA FRACTURE, RIGHT, CLOSED, INITIAL ENCOUNTER: ICD-10-CM

## 2024-05-23 DIAGNOSIS — M25.571 ACUTE RIGHT ANKLE PAIN: ICD-10-CM

## 2024-05-23 DIAGNOSIS — S82.201A TIBIA/FIBULA FRACTURE, RIGHT, CLOSED, INITIAL ENCOUNTER: ICD-10-CM

## 2024-05-23 DIAGNOSIS — R26.9 ABNORMAL GAIT: Primary | ICD-10-CM

## 2024-05-23 PROCEDURE — 97112 NEUROMUSCULAR REEDUCATION: CPT | Mod: GP | Performed by: PHYSICAL THERAPIST

## 2024-05-23 PROCEDURE — 97140 MANUAL THERAPY 1/> REGIONS: CPT | Mod: GP | Performed by: PHYSICAL THERAPIST

## 2024-05-23 PROCEDURE — 97110 THERAPEUTIC EXERCISES: CPT | Mod: GP | Performed by: PHYSICAL THERAPIST

## 2024-06-10 ENCOUNTER — THERAPY VISIT (OUTPATIENT)
Dept: PHYSICAL THERAPY | Facility: CLINIC | Age: 69
End: 2024-06-10
Payer: COMMERCIAL

## 2024-06-10 DIAGNOSIS — M25.571 ACUTE RIGHT ANKLE PAIN: ICD-10-CM

## 2024-06-10 DIAGNOSIS — R26.9 ABNORMAL GAIT: Primary | ICD-10-CM

## 2024-06-10 DIAGNOSIS — S82.401A TIBIA/FIBULA FRACTURE, RIGHT, CLOSED, INITIAL ENCOUNTER: ICD-10-CM

## 2024-06-10 DIAGNOSIS — S82.201A TIBIA/FIBULA FRACTURE, RIGHT, CLOSED, INITIAL ENCOUNTER: ICD-10-CM

## 2024-06-10 PROCEDURE — 97140 MANUAL THERAPY 1/> REGIONS: CPT | Mod: GP | Performed by: PHYSICAL THERAPIST

## 2024-06-10 PROCEDURE — 97110 THERAPEUTIC EXERCISES: CPT | Mod: GP | Performed by: PHYSICAL THERAPIST

## 2024-09-05 NOTE — PLAN OF CARE
Goal Outcome Evaluation:         .Patient vital signs are at baseline: Yes  Patient able to ambulate as they were prior to admission or with assist devices provided by therapies during their stay:  No,  Reason:  Assist of 1 with GB and walker  Patient MUST void prior to discharge:  Yes  Patient able to tolerate oral intake:  Yes  Pain has adequate pain control using Oral analgesics:  Yes, denies pain most of the time.  Does patient have an identified :  Yes  Has goal D/C date and time been discussed with patient:  yes.    Pt is alert and oriented x4, Assist x1 with walker and GB pivoting to bedside commode. Regular diet. Cms intact. Dressing CDI. Possible discharge to home today.                pre op anxiety

## 2024-11-27 RX ORDER — MULTIVITAMIN
1 TABLET ORAL DAILY
COMMUNITY

## 2024-11-27 RX ORDER — SENNOSIDES 8.6 MG
1300 CAPSULE ORAL AT BEDTIME
Status: ON HOLD | COMMUNITY
End: 2025-01-29

## 2024-11-27 RX ORDER — ACETAMINOPHEN 80 MG/1
80 TABLET, CHEWABLE ORAL EVERY 4 HOURS PRN
COMMUNITY
End: 2024-11-27

## 2024-11-27 NOTE — PROGRESS NOTES
PTA medications updated by Medication Scribe prior to surgery via phone call with patient (last doses completed by Nurse)     Medication history sources: Patient and H&P  In the past week, patient estimated taking medication this percent of the time: Greater than 90%      Significant changes made to the medication list:  Patient reports no longer taking the following meds (med scribe removed from PTA med list): Zofran, oxycodone, probiotic, Senokot-S      Additional medication history information:   None    Medication reconciliation completed by provider prior to medication history? No    Time spent in this activity: 30 minutes    The information provided in this note is only as accurate as the sources available at the time of update(s)      Prior to Admission medications    Medication Sig Last Dose Taking? Auth Provider Long Term End Date   acetaminophen (TYLENOL) 650 MG CR tablet Take 1,300 mg by mouth at bedtime. Bedtime Yes Reported, Patient     calcitRIOL (ROCALTROL) 0.25 MCG capsule Take 0.25 mcg by mouth daily. Morning Yes Richard Benson MD Yes    EPINEPHrine (ANY BX GENERIC EQUIV) 0.3 MG/0.3ML injection 2-pack Inject 0.3 mg into the muscle as needed for anaphylaxis May repeat one time in 5-15 minutes if response to initial dose is inadequate.  Yes Reported, Patient     FLUTICASONE PROPIONATE (NASAL) NA Spray 1 spray in nostril daily as needed  Yes Reported, Patient     lisinopril (ZESTRIL) 20 MG tablet Take 1 tablet by mouth daily Morning Yes Reported, Patient Yes    Melatonin 10 MG TABS tablet Take 10 mg by mouth nightly as needed for sleep. Bedtime Yes Reported, Patient     Misc Natural Products (OSTEO BI-FLEX ADV JOINT SHIELD) TABS Take 1,500 mg by mouth daily Morning Yes Reported, Patient     multivitamin w/minerals (MULTI-VITAMIN) tablet Take 1 tablet by mouth daily. Morning Yes Reported, Patient     Vitamin D3 (CHOLECALCIFEROL) 25 mcg (1000 units) tablet Take 25 mcg by mouth daily. Bedtime Yes  Reported, Patient     Ubiquinol 300 MG CAPS Take 300 mg by mouth daily. Morning Yes Reported, Patient     valACYclovir (VALTREX) 1000 mg tablet Take 1,000 mg by mouth daily as needed (cold sores) More than a month Yes Unknown, Entered By History Yes        Medication history completed by: Rojas Rodriguez

## 2024-12-03 ENCOUNTER — MEDICAL CORRESPONDENCE (OUTPATIENT)
Dept: HEALTH INFORMATION MANAGEMENT | Facility: CLINIC | Age: 69
End: 2024-12-03

## 2024-12-15 ENCOUNTER — HEALTH MAINTENANCE LETTER (OUTPATIENT)
Age: 69
End: 2024-12-15

## 2025-01-22 RX ORDER — IBUPROFEN 200 MG
CAPSULE ORAL
COMMUNITY

## 2025-01-22 NOTE — PROGRESS NOTES
PTA medications updated by Medication Scribe prior to surgery via phone call with patient (last doses completed by Nurse)     Medication history sources: Patient, Surescripts, and H&P  In the past week, patient estimated taking medication this percent of the time: Greater than 90%      Significant changes made to the medication list:  None      Additional medication history information:   Patient was advised to bring: Flonase    Medication reconciliation completed by provider prior to medication history? No    Time spent in this activity: 30 minutes    The information provided in this note is only as accurate as the sources available at the time of update(s)      Prior to Admission medications    Medication Sig Last Dose Taking? Auth Provider Long Term End Date   acetaminophen (TYLENOL) 650 MG CR tablet Take 1,300 mg by mouth at bedtime. Bedtime Yes Reported, Patient     calcitRIOL (ROCALTROL) 0.25 MCG capsule Take 0.25 mcg by mouth daily. Morning Yes Richard Benson MD Yes    calcium 600 MG tablet 1200 mg ([id number]) in AM and 600 mg in the PM  Yes Reported, Patient     EPINEPHrine (ANY BX GENERIC EQUIV) 0.3 MG/0.3ML injection 2-pack Inject 0.3 mg into the muscle as needed for anaphylaxis May repeat one time in 5-15 minutes if response to initial dose is inadequate.  Yes Reported, Patient     FLUTICASONE PROPIONATE (NASAL) NA Spray 1 spray in nostril daily as needed  Yes Reported, Patient     lisinopril (ZESTRIL) 20 MG tablet Take 1 tablet by mouth daily Morning Yes Reported, Patient Yes    Melatonin 10 MG TABS tablet Take 10 mg by mouth nightly as needed for sleep. Bedtime Yes Reported, Patient     Misc Natural Products (OSTEO BI-FLEX ADV JOINT SHIELD) TABS Take 1,500 mg by mouth daily Morning Yes Reported, Patient     multivitamin w/minerals (MULTI-VITAMIN) tablet Take 1 tablet by mouth daily. Morning Yes Reported, Patient     Red Yeast Rice Extract (RED YEAST RICE PO) Take 2 capsules by mouth every  morning.  Yes Reported, Patient     valACYclovir (VALTREX) 1000 mg tablet Take 1,000 mg by mouth daily as needed (cold sores) More than a month Yes Unknown, Entered By History Yes    Vitamin D3 (CHOLECALCIFEROL) 25 mcg (1000 units) tablet Take 25 mcg by mouth daily. Bedtime Yes Reported, Patient         Medication history completed by: Rojas Rodriguez

## 2025-01-27 ENCOUNTER — ANESTHESIA EVENT (OUTPATIENT)
Dept: SURGERY | Facility: CLINIC | Age: 70
End: 2025-01-27
Payer: COMMERCIAL

## 2025-01-27 RX ORDER — LIDOCAINE 40 MG/G
CREAM TOPICAL
Status: CANCELLED | OUTPATIENT
Start: 2025-01-27

## 2025-01-27 RX ORDER — SODIUM CHLORIDE, SODIUM LACTATE, POTASSIUM CHLORIDE, CALCIUM CHLORIDE 600; 310; 30; 20 MG/100ML; MG/100ML; MG/100ML; MG/100ML
INJECTION, SOLUTION INTRAVENOUS CONTINUOUS
Status: CANCELLED | OUTPATIENT
Start: 2025-01-27

## 2025-01-27 ASSESSMENT — COPD QUESTIONNAIRES: COPD: 0

## 2025-01-27 ASSESSMENT — LIFESTYLE VARIABLES: TOBACCO_USE: 0

## 2025-01-27 NOTE — ANESTHESIA PREPROCEDURE EVALUATION
Anesthesia Pre-Procedure Evaluation    Patient: Nila Viveros   MRN: [id number] : 1955        Procedure : Procedure(s):  Left Total Knee Arthroplasty          Past Medical History:   Diagnosis Date     Anxiety      Degenerative arthritis of knee, bilateral      Essential hypertension      Giardia     has intermittent flares of diarrhea     Hyperlipidemia      Hypertension      Hypertensive disorder      Hypoparathyroidism      Infection due to 2019 novel coronavirus      Knee mass, right      Vasovagal attack       Past Surgical History:   Procedure Laterality Date     ARTHROPLASTY KNEE Right 10/03/2023    Procedure: RIGHT TOTAL KNEE ARTHROPLASTY;  Surgeon: Darshan Caban MD;  Location: [facility] OR     KNEE ARTHROSCOPY Right      LAP ABLATION FOR UTERINE FIBROIDS       LEEP       PARATHYROIDECTOMY N/A 2024    Procedure: SUBTOTAL PARATHYROIDECTOMY;  Surgeon: Ally Briggs MD;  Location: [facility] OR     TUBAL LIGATION       VEIN STRIPPING, LEG Right       Allergies   Allergen Reactions     Azithromycin Hives     Bee Venom Hives     Ciprofloxacin      Sulfa Antibiotics      Zinc Nausea and Vomiting     Nickel Rash     Silver Rash      Social History     Tobacco Use     Smoking status: Never     Passive exposure: Never     Smokeless tobacco: Never   Substance Use Topics     Alcohol use: Yes     Comment: very rare      Wt Readings from Last 1 Encounters:   24 69.9 kg (154 lb)        Anesthesia Evaluation   Pt has had prior anesthetic. Type: General.    No history of anesthetic complications       ROS/MED HX  ENT/Pulmonary:    (-) tobacco use, asthma, COPD and sleep apnea   Neurologic:  - neg neurologic ROS     Cardiovascular: Comment: Vasovagal syncope    (+) Dyslipidemia hypertension- -   -  - -                                      METS/Exercise Tolerance:     Hematologic:  - neg hematologic  ROS     Musculoskeletal:       GI/Hepatic:    (-) GERD and liver disease  "  Renal/Genitourinary:    (-) renal disease   Endo:    (-) Type I DM and Type II DM   Psychiatric/Substance Use:     (+) psychiatric history anxiety       Infectious Disease:       Malignancy:       Other:            Physical Exam    Airway        Mallampati: II   TM distance: > 3 FB   Neck ROM: full   Mouth opening: > 3 cm    Respiratory Devices and Support         Dental       (+) Minor Abnormalities - some fillings, tiny chips      Cardiovascular          Rhythm and rate: regular and normal     Pulmonary   pulmonary exam normal            OUTSIDE LABS:  CBC:   Lab Results   Component Value Date    WBC 6.6 02/23/2024    WBC 7.3 02/23/2024    HGB 12.3 02/23/2024    HGB 12.7 02/23/2024    HCT 36.1 02/23/2024    HCT 37.7 02/23/2024     02/23/2024     02/23/2024     BMP:   Lab Results   Component Value Date     02/23/2024     02/23/2024    POTASSIUM 3.5 02/23/2024    POTASSIUM 3.6 02/23/2024    CHLORIDE 97 (L) 02/23/2024    CHLORIDE 97 (L) 02/23/2024    CO2 29 02/23/2024    CO2 32 (H) 02/23/2024    BUN 19.2 02/23/2024    BUN 21.1 02/23/2024    CR 0.97 (H) 02/23/2024    CR 1.00 (H) 02/23/2024    GLC 90 02/23/2024     (H) 02/23/2024     COAGS: No results found for: \"PTT\", \"INR\", \"FIBR\"  POC: No results found for: \"BGM\", \"HCG\", \"HCGS\"  HEPATIC:   Lab Results   Component Value Date    ALBUMIN 4.4 02/23/2024    PROTTOTAL 7.2 02/23/2024    ALT 40 02/23/2024    AST 22 02/23/2024    ALKPHOS 180 (H) 02/23/2024    BILITOTAL 1.5 (H) 02/23/2024     OTHER:   Lab Results   Component Value Date    JARRETT 12.0 (H) 02/23/2024    PHOS 5.9 (H) 02/12/2024    MAG 2.3 02/13/2024    LIPASE 33 02/23/2024       Anesthesia Plan    ASA Status:  2    NPO Status:  NPO Appropriate    Anesthesia Type: Spinal.              Consents    Anesthesia Plan(s) and associated risks, benefits, and realistic alternatives discussed. Questions answered and patient/representative(s) expressed understanding.     - Discussed:     - " Discussed with:  Patient            Postoperative Care    Pain management: Peripheral nerve block (Single Shot), Multi-modal analgesia.   PONV prophylaxis: Ondansetron (or other 5HT-3), Dexamethasone or Solumedrol     Comments:               Rohan Camara MD    I have reviewed the pertinent notes and labs in the chart from the past 30 days and (re)examined the patient.  Any updates or changes from those notes are reflected in this note.    Clinically Significant Risk Factors Present on Admission

## 2025-01-28 ENCOUNTER — HOSPITAL ENCOUNTER (OUTPATIENT)
Facility: CLINIC | Age: 70
Discharge: NURSING, REHAB OR LONG-TERM CARE | End: 2025-01-30
Attending: ORTHOPAEDIC SURGERY | Admitting: ORTHOPAEDIC SURGERY
Payer: COMMERCIAL

## 2025-01-28 ENCOUNTER — ANESTHESIA (OUTPATIENT)
Dept: SURGERY | Facility: CLINIC | Age: 70
End: 2025-01-28
Payer: COMMERCIAL

## 2025-01-28 ENCOUNTER — APPOINTMENT (OUTPATIENT)
Dept: GENERAL RADIOLOGY | Facility: CLINIC | Age: 70
End: 2025-01-28
Attending: ORTHOPAEDIC SURGERY
Payer: COMMERCIAL

## 2025-01-28 ENCOUNTER — APPOINTMENT (OUTPATIENT)
Dept: PHYSICAL THERAPY | Facility: CLINIC | Age: 70
End: 2025-01-28
Attending: ORTHOPAEDIC SURGERY
Payer: COMMERCIAL

## 2025-01-28 DIAGNOSIS — Z96.652 S/P TOTAL KNEE ARTHROPLASTY, LEFT: Primary | ICD-10-CM

## 2025-01-28 LAB
CREAT SERPL-MCNC: 0.64 MG/DL (ref 0.51–0.95)
EGFRCR SERPLBLD CKD-EPI 2021: >90 ML/MIN/1.73M2
FASTING STATUS PATIENT QL REPORTED: YES
GLUCOSE SERPL-MCNC: 99 MG/DL (ref 70–99)
HGB BLD-MCNC: 12.1 G/DL (ref 11.7–15.7)
PLATELET # BLD AUTO: 267 10E3/UL (ref 150–450)
POTASSIUM SERPL-SCNC: 4 MMOL/L (ref 3.4–5.3)

## 2025-01-28 PROCEDURE — 97530 THERAPEUTIC ACTIVITIES: CPT | Mod: GP

## 2025-01-28 PROCEDURE — 36415 COLL VENOUS BLD VENIPUNCTURE: CPT | Performed by: ANESTHESIOLOGY

## 2025-01-28 PROCEDURE — 64447 NJX AA&/STRD FEMORAL NRV IMG: CPT | Mod: XU

## 2025-01-28 PROCEDURE — 85018 HEMOGLOBIN: CPT | Performed by: ANESTHESIOLOGY

## 2025-01-28 PROCEDURE — 258N000003 HC RX IP 258 OP 636: Performed by: NURSE ANESTHETIST, CERTIFIED REGISTERED

## 2025-01-28 PROCEDURE — 82565 ASSAY OF CREATININE: CPT | Performed by: ANESTHESIOLOGY

## 2025-01-28 PROCEDURE — 370N000017 HC ANESTHESIA TECHNICAL FEE, PER MIN: Performed by: ORTHOPAEDIC SURGERY

## 2025-01-28 PROCEDURE — 250N000011 HC RX IP 250 OP 636: Performed by: ORTHOPAEDIC SURGERY

## 2025-01-28 PROCEDURE — 250N000009 HC RX 250: Performed by: ANESTHESIOLOGY

## 2025-01-28 PROCEDURE — 360N000077 HC SURGERY LEVEL 4, PER MIN: Performed by: ORTHOPAEDIC SURGERY

## 2025-01-28 PROCEDURE — 272N000001 HC OR GENERAL SUPPLY STERILE: Performed by: ORTHOPAEDIC SURGERY

## 2025-01-28 PROCEDURE — C1713 ANCHOR/SCREW BN/BN,TIS/BN: HCPCS | Performed by: ORTHOPAEDIC SURGERY

## 2025-01-28 PROCEDURE — 250N000011 HC RX IP 250 OP 636: Performed by: PHYSICIAN ASSISTANT

## 2025-01-28 PROCEDURE — 710N000009 HC RECOVERY PHASE 1, LEVEL 1, PER MIN: Performed by: ORTHOPAEDIC SURGERY

## 2025-01-28 PROCEDURE — 85049 AUTOMATED PLATELET COUNT: CPT | Performed by: ANESTHESIOLOGY

## 2025-01-28 PROCEDURE — 250N000013 HC RX MED GY IP 250 OP 250 PS 637: Performed by: ORTHOPAEDIC SURGERY

## 2025-01-28 PROCEDURE — 84132 ASSAY OF SERUM POTASSIUM: CPT | Performed by: ANESTHESIOLOGY

## 2025-01-28 PROCEDURE — 250N000011 HC RX IP 250 OP 636: Mod: JW | Performed by: ANESTHESIOLOGY

## 2025-01-28 PROCEDURE — 250N000025 HC SEVOFLURANE, PER MIN: Performed by: ORTHOPAEDIC SURGERY

## 2025-01-28 PROCEDURE — 250N000009 HC RX 250: Performed by: ORTHOPAEDIC SURGERY

## 2025-01-28 PROCEDURE — 250N000013 HC RX MED GY IP 250 OP 250 PS 637: Performed by: PHYSICIAN ASSISTANT

## 2025-01-28 PROCEDURE — 250N000011 HC RX IP 250 OP 636: Performed by: NURSE ANESTHETIST, CERTIFIED REGISTERED

## 2025-01-28 PROCEDURE — 258N000003 HC RX IP 258 OP 636: Performed by: ANESTHESIOLOGY

## 2025-01-28 PROCEDURE — 250N000009 HC RX 250: Performed by: NURSE ANESTHETIST, CERTIFIED REGISTERED

## 2025-01-28 PROCEDURE — 258N000001 HC RX 258: Performed by: ORTHOPAEDIC SURGERY

## 2025-01-28 PROCEDURE — 999N000065 XR KNEE PORT LEFT 1/2 VIEWS: Mod: LT

## 2025-01-28 PROCEDURE — C1776 JOINT DEVICE (IMPLANTABLE): HCPCS | Performed by: ORTHOPAEDIC SURGERY

## 2025-01-28 PROCEDURE — 97116 GAIT TRAINING THERAPY: CPT | Mod: GP

## 2025-01-28 PROCEDURE — 250N000011 HC RX IP 250 OP 636: Performed by: ANESTHESIOLOGY

## 2025-01-28 PROCEDURE — 999N000141 HC STATISTIC PRE-PROCEDURE NURSING ASSESSMENT: Performed by: ORTHOPAEDIC SURGERY

## 2025-01-28 PROCEDURE — 97161 PT EVAL LOW COMPLEX 20 MIN: CPT | Mod: GP

## 2025-01-28 PROCEDURE — 82947 ASSAY GLUCOSE BLOOD QUANT: CPT | Performed by: ANESTHESIOLOGY

## 2025-01-28 DEVICE — LEGION PS HIGH FLEX XLPE SZ 3-4 12MM
Type: IMPLANTABLE DEVICE | Site: KNEE | Status: FUNCTIONAL
Brand: LEGION

## 2025-01-28 DEVICE — LEGION NARROW POSTERIOR STABILIZED                                    OXINIUM SIZE 5N LEFT
Type: IMPLANTABLE DEVICE | Site: KNEE | Status: FUNCTIONAL
Brand: LEGION

## 2025-01-28 DEVICE — GENESIS II RESURFACING PATELLAR                                    PROSTHESIS  32MM
Type: IMPLANTABLE DEVICE | Site: KNEE | Status: FUNCTIONAL
Brand: GENESIS II

## 2025-01-28 DEVICE — SIMPLEX® HV IS A FAST-SETTING ACRYLIC RESIN FOR USE IN BONE SURGERY. MIXING THE TWO SEPARATE STERILE COMPONENTS PRODUCES A DUCTILE BONE CEMENT WHICH, AFTER HARDENING, FIXES THE IMPLANT AND TRANSFERS STRESSES PRODUCED DURING MOVEMENT EVENLY TO THE BONE. SIMPLEX® HV CEMENT POWDER ALSO CONTAINS INSOLUBLE ZIRCONIUM DIOXIDE AS AN X-RAY CONTRAST MEDIUM. SIMPLEX® HV DOES NOT EMIT A SIGNAL AND DOES NOT POSE A SAFETY RISK IN A MAGNETIC RESONANCE ENVIRONMENT.
Type: IMPLANTABLE DEVICE | Site: KNEE | Status: FUNCTIONAL
Brand: SIMPLEX HV

## 2025-01-28 DEVICE — GENESIS II NON-POROUS TIBIAL                                    BASEPLATE SIZE 4 LEFT
Type: IMPLANTABLE DEVICE | Site: KNEE | Status: FUNCTIONAL
Brand: GENESIS II

## 2025-01-28 RX ORDER — ONDANSETRON 4 MG/1
4 TABLET, ORALLY DISINTEGRATING ORAL EVERY 6 HOURS PRN
Status: DISCONTINUED | OUTPATIENT
Start: 2025-01-28 | End: 2025-01-30 | Stop reason: HOSPADM

## 2025-01-28 RX ORDER — DEXAMETHASONE SODIUM PHOSPHATE 4 MG/ML
INJECTION, SOLUTION INTRA-ARTICULAR; INTRALESIONAL; INTRAMUSCULAR; INTRAVENOUS; SOFT TISSUE PRN
Status: DISCONTINUED | OUTPATIENT
Start: 2025-01-28 | End: 2025-01-28

## 2025-01-28 RX ORDER — FENTANYL CITRATE 0.05 MG/ML
50 INJECTION, SOLUTION INTRAMUSCULAR; INTRAVENOUS
Status: DISCONTINUED | OUTPATIENT
Start: 2025-01-28 | End: 2025-01-28 | Stop reason: HOSPADM

## 2025-01-28 RX ORDER — LIDOCAINE HYDROCHLORIDE 20 MG/ML
INJECTION, SOLUTION INFILTRATION; PERINEURAL PRN
Status: DISCONTINUED | OUTPATIENT
Start: 2025-01-28 | End: 2025-01-28

## 2025-01-28 RX ORDER — NALOXONE HYDROCHLORIDE 0.4 MG/ML
0.2 INJECTION, SOLUTION INTRAMUSCULAR; INTRAVENOUS; SUBCUTANEOUS
Status: DISCONTINUED | OUTPATIENT
Start: 2025-01-28 | End: 2025-01-30 | Stop reason: HOSPADM

## 2025-01-28 RX ORDER — SODIUM CHLORIDE, SODIUM LACTATE, POTASSIUM CHLORIDE, CALCIUM CHLORIDE 600; 310; 30; 20 MG/100ML; MG/100ML; MG/100ML; MG/100ML
INJECTION, SOLUTION INTRAVENOUS CONTINUOUS
Status: DISCONTINUED | OUTPATIENT
Start: 2025-01-28 | End: 2025-01-28 | Stop reason: HOSPADM

## 2025-01-28 RX ORDER — NALOXONE HYDROCHLORIDE 0.4 MG/ML
0.4 INJECTION, SOLUTION INTRAMUSCULAR; INTRAVENOUS; SUBCUTANEOUS
Status: DISCONTINUED | OUTPATIENT
Start: 2025-01-28 | End: 2025-01-30 | Stop reason: HOSPADM

## 2025-01-28 RX ORDER — HYDROMORPHONE HYDROCHLORIDE 2 MG/1
2 TABLET ORAL EVERY 4 HOURS PRN
Status: DISCONTINUED | OUTPATIENT
Start: 2025-01-28 | End: 2025-01-30 | Stop reason: HOSPADM

## 2025-01-28 RX ORDER — FLUMAZENIL 0.1 MG/ML
0.2 INJECTION, SOLUTION INTRAVENOUS
Status: DISCONTINUED | OUTPATIENT
Start: 2025-01-28 | End: 2025-01-28 | Stop reason: HOSPADM

## 2025-01-28 RX ORDER — AMOXICILLIN 250 MG
1-2 CAPSULE ORAL 2 TIMES DAILY
Qty: 30 TABLET | Refills: 0 | Status: SHIPPED | OUTPATIENT
Start: 2025-01-28

## 2025-01-28 RX ORDER — ASPIRIN 81 MG/1
81 TABLET ORAL 2 TIMES DAILY
Qty: 60 TABLET | Refills: 0 | Status: SHIPPED | OUTPATIENT
Start: 2025-01-28

## 2025-01-28 RX ORDER — DEXAMETHASONE SODIUM PHOSPHATE 4 MG/ML
4 INJECTION, SOLUTION INTRA-ARTICULAR; INTRALESIONAL; INTRAMUSCULAR; INTRAVENOUS; SOFT TISSUE
Status: DISCONTINUED | OUTPATIENT
Start: 2025-01-28 | End: 2025-01-28 | Stop reason: HOSPADM

## 2025-01-28 RX ORDER — POLYETHYLENE GLYCOL 3350 17 G/17G
17 POWDER, FOR SOLUTION ORAL DAILY
Status: DISCONTINUED | OUTPATIENT
Start: 2025-01-29 | End: 2025-01-30 | Stop reason: HOSPADM

## 2025-01-28 RX ORDER — HYDROMORPHONE HYDROCHLORIDE 2 MG/1
2-4 TABLET ORAL EVERY 4 HOURS PRN
Qty: 31 TABLET | Refills: 0 | Status: SHIPPED | OUTPATIENT
Start: 2025-01-28

## 2025-01-28 RX ORDER — ACETAMINOPHEN 325 MG/1
650 TABLET ORAL EVERY 4 HOURS PRN
Status: DISCONTINUED | OUTPATIENT
Start: 2025-01-28 | End: 2025-01-30 | Stop reason: HOSPADM

## 2025-01-28 RX ORDER — TRANEXAMIC ACID 650 MG/1
1950 TABLET ORAL ONCE
Status: COMPLETED | OUTPATIENT
Start: 2025-01-28 | End: 2025-01-28

## 2025-01-28 RX ORDER — HYDROMORPHONE HYDROCHLORIDE 2 MG/1
4 TABLET ORAL EVERY 4 HOURS PRN
Status: DISCONTINUED | OUTPATIENT
Start: 2025-01-28 | End: 2025-01-30 | Stop reason: HOSPADM

## 2025-01-28 RX ORDER — HYDROXYZINE HYDROCHLORIDE 10 MG/1
10 TABLET, FILM COATED ORAL EVERY 6 HOURS PRN
Qty: 30 TABLET | Refills: 0 | Status: SHIPPED | OUTPATIENT
Start: 2025-01-28

## 2025-01-28 RX ORDER — MAGNESIUM HYDROXIDE 1200 MG/15ML
LIQUID ORAL PRN
Status: DISCONTINUED | OUTPATIENT
Start: 2025-01-28 | End: 2025-01-28 | Stop reason: HOSPADM

## 2025-01-28 RX ORDER — CEFAZOLIN SODIUM/WATER 2 G/20 ML
2 SYRINGE (ML) INTRAVENOUS SEE ADMIN INSTRUCTIONS
Status: DISCONTINUED | OUTPATIENT
Start: 2025-01-28 | End: 2025-01-28 | Stop reason: HOSPADM

## 2025-01-28 RX ORDER — FENTANYL CITRATE 50 UG/ML
25 INJECTION, SOLUTION INTRAMUSCULAR; INTRAVENOUS EVERY 5 MIN PRN
Status: DISCONTINUED | OUTPATIENT
Start: 2025-01-28 | End: 2025-01-28 | Stop reason: HOSPADM

## 2025-01-28 RX ORDER — NALOXONE HYDROCHLORIDE 0.4 MG/ML
0.1 INJECTION, SOLUTION INTRAMUSCULAR; INTRAVENOUS; SUBCUTANEOUS
Status: DISCONTINUED | OUTPATIENT
Start: 2025-01-28 | End: 2025-01-28 | Stop reason: HOSPADM

## 2025-01-28 RX ORDER — CEFAZOLIN SODIUM 1 G/3ML
1 INJECTION, POWDER, FOR SOLUTION INTRAMUSCULAR; INTRAVENOUS EVERY 8 HOURS
Status: COMPLETED | OUTPATIENT
Start: 2025-01-28 | End: 2025-01-29

## 2025-01-28 RX ORDER — ACETAMINOPHEN 325 MG/1
975 TABLET ORAL EVERY 8 HOURS
Status: DISCONTINUED | OUTPATIENT
Start: 2025-01-28 | End: 2025-01-30 | Stop reason: HOSPADM

## 2025-01-28 RX ORDER — ACETAMINOPHEN 325 MG/1
650 TABLET ORAL EVERY 4 HOURS PRN
Qty: 100 TABLET | Refills: 0 | Status: SHIPPED | OUTPATIENT
Start: 2025-01-28

## 2025-01-28 RX ORDER — HYDROXYZINE HYDROCHLORIDE 10 MG/1
10 TABLET, FILM COATED ORAL EVERY 6 HOURS PRN
Status: DISCONTINUED | OUTPATIENT
Start: 2025-01-28 | End: 2025-01-30 | Stop reason: HOSPADM

## 2025-01-28 RX ORDER — ASPIRIN 81 MG/1
81 TABLET ORAL 2 TIMES DAILY
Status: DISCONTINUED | OUTPATIENT
Start: 2025-01-28 | End: 2025-01-30 | Stop reason: HOSPADM

## 2025-01-28 RX ORDER — BUPIVACAINE HYDROCHLORIDE 7.5 MG/ML
INJECTION, SOLUTION INTRASPINAL
Status: DISCONTINUED | OUTPATIENT
Start: 2025-01-28 | End: 2025-01-28

## 2025-01-28 RX ORDER — ONDANSETRON 2 MG/ML
4 INJECTION INTRAMUSCULAR; INTRAVENOUS EVERY 30 MIN PRN
Status: DISCONTINUED | OUTPATIENT
Start: 2025-01-28 | End: 2025-01-28 | Stop reason: HOSPADM

## 2025-01-28 RX ORDER — ONDANSETRON 2 MG/ML
INJECTION INTRAMUSCULAR; INTRAVENOUS PRN
Status: DISCONTINUED | OUTPATIENT
Start: 2025-01-28 | End: 2025-01-28

## 2025-01-28 RX ORDER — PROPOFOL 10 MG/ML
INJECTION, EMULSION INTRAVENOUS CONTINUOUS PRN
Status: DISCONTINUED | OUTPATIENT
Start: 2025-01-28 | End: 2025-01-28

## 2025-01-28 RX ORDER — VANCOMYCIN HYDROCHLORIDE 1 G/20ML
INJECTION, POWDER, LYOPHILIZED, FOR SOLUTION INTRAVENOUS PRN
Status: DISCONTINUED | OUTPATIENT
Start: 2025-01-28 | End: 2025-01-28 | Stop reason: HOSPADM

## 2025-01-28 RX ORDER — HYDROMORPHONE HCL IN WATER/PF 6 MG/30 ML
0.4 PATIENT CONTROLLED ANALGESIA SYRINGE INTRAVENOUS EVERY 5 MIN PRN
Status: DISCONTINUED | OUTPATIENT
Start: 2025-01-28 | End: 2025-01-28 | Stop reason: HOSPADM

## 2025-01-28 RX ORDER — HYDROMORPHONE HCL IN WATER/PF 6 MG/30 ML
0.2 PATIENT CONTROLLED ANALGESIA SYRINGE INTRAVENOUS EVERY 5 MIN PRN
Status: DISCONTINUED | OUTPATIENT
Start: 2025-01-28 | End: 2025-01-28 | Stop reason: HOSPADM

## 2025-01-28 RX ORDER — BISACODYL 10 MG
10 SUPPOSITORY, RECTAL RECTAL DAILY PRN
Status: DISCONTINUED | OUTPATIENT
Start: 2025-01-28 | End: 2025-01-30 | Stop reason: HOSPADM

## 2025-01-28 RX ORDER — HYDROMORPHONE HCL IN WATER/PF 6 MG/30 ML
0.4 PATIENT CONTROLLED ANALGESIA SYRINGE INTRAVENOUS
Status: DISCONTINUED | OUTPATIENT
Start: 2025-01-28 | End: 2025-01-30 | Stop reason: HOSPADM

## 2025-01-28 RX ORDER — ONDANSETRON 2 MG/ML
4 INJECTION INTRAMUSCULAR; INTRAVENOUS EVERY 6 HOURS PRN
Status: DISCONTINUED | OUTPATIENT
Start: 2025-01-28 | End: 2025-01-30 | Stop reason: HOSPADM

## 2025-01-28 RX ORDER — HYDROMORPHONE HYDROCHLORIDE 1 MG/ML
INJECTION, SOLUTION INTRAMUSCULAR; INTRAVENOUS; SUBCUTANEOUS PRN
Status: DISCONTINUED | OUTPATIENT
Start: 2025-01-28 | End: 2025-01-28

## 2025-01-28 RX ORDER — LIDOCAINE 40 MG/G
CREAM TOPICAL
Status: DISCONTINUED | OUTPATIENT
Start: 2025-01-28 | End: 2025-01-30 | Stop reason: HOSPADM

## 2025-01-28 RX ORDER — HYDROMORPHONE HCL IN WATER/PF 6 MG/30 ML
0.2 PATIENT CONTROLLED ANALGESIA SYRINGE INTRAVENOUS
Status: DISCONTINUED | OUTPATIENT
Start: 2025-01-28 | End: 2025-01-30 | Stop reason: HOSPADM

## 2025-01-28 RX ORDER — SODIUM CHLORIDE, SODIUM LACTATE, POTASSIUM CHLORIDE, CALCIUM CHLORIDE 600; 310; 30; 20 MG/100ML; MG/100ML; MG/100ML; MG/100ML
INJECTION, SOLUTION INTRAVENOUS CONTINUOUS
Status: DISCONTINUED | OUTPATIENT
Start: 2025-01-28 | End: 2025-01-30 | Stop reason: HOSPADM

## 2025-01-28 RX ORDER — ONDANSETRON 4 MG/1
4 TABLET, ORALLY DISINTEGRATING ORAL EVERY 30 MIN PRN
Status: DISCONTINUED | OUTPATIENT
Start: 2025-01-28 | End: 2025-01-28 | Stop reason: HOSPADM

## 2025-01-28 RX ORDER — FENTANYL CITRATE 50 UG/ML
50 INJECTION, SOLUTION INTRAMUSCULAR; INTRAVENOUS EVERY 5 MIN PRN
Status: DISCONTINUED | OUTPATIENT
Start: 2025-01-28 | End: 2025-01-28 | Stop reason: HOSPADM

## 2025-01-28 RX ORDER — PROPOFOL 10 MG/ML
INJECTION, EMULSION INTRAVENOUS PRN
Status: DISCONTINUED | OUTPATIENT
Start: 2025-01-28 | End: 2025-01-28

## 2025-01-28 RX ORDER — CEFAZOLIN SODIUM/WATER 2 G/20 ML
2 SYRINGE (ML) INTRAVENOUS
Status: COMPLETED | OUTPATIENT
Start: 2025-01-28 | End: 2025-01-28

## 2025-01-28 RX ORDER — PROCHLORPERAZINE MALEATE 5 MG/1
5 TABLET ORAL EVERY 6 HOURS PRN
Status: DISCONTINUED | OUTPATIENT
Start: 2025-01-28 | End: 2025-01-30 | Stop reason: HOSPADM

## 2025-01-28 RX ORDER — AMOXICILLIN 250 MG
1 CAPSULE ORAL 2 TIMES DAILY
Status: DISCONTINUED | OUTPATIENT
Start: 2025-01-28 | End: 2025-01-30 | Stop reason: HOSPADM

## 2025-01-28 RX ORDER — FENTANYL CITRATE 50 UG/ML
INJECTION, SOLUTION INTRAMUSCULAR; INTRAVENOUS PRN
Status: DISCONTINUED | OUTPATIENT
Start: 2025-01-28 | End: 2025-01-28

## 2025-01-28 RX ORDER — LISINOPRIL 20 MG/1
20 TABLET ORAL DAILY
Status: DISCONTINUED | OUTPATIENT
Start: 2025-01-29 | End: 2025-01-30 | Stop reason: HOSPADM

## 2025-01-28 RX ADMIN — PROPOFOL 30 MG: 10 INJECTION, EMULSION INTRAVENOUS at 09:25

## 2025-01-28 RX ADMIN — MIDAZOLAM 2 MG: 1 INJECTION INTRAMUSCULAR; INTRAVENOUS at 08:30

## 2025-01-28 RX ADMIN — PROPOFOL 100 MG: 10 INJECTION, EMULSION INTRAVENOUS at 09:32

## 2025-01-28 RX ADMIN — HYDROMORPHONE HYDROCHLORIDE 0.5 MG: 1 INJECTION, SOLUTION INTRAMUSCULAR; INTRAVENOUS; SUBCUTANEOUS at 09:39

## 2025-01-28 RX ADMIN — FENTANYL CITRATE 25 MCG: 50 INJECTION, SOLUTION INTRAMUSCULAR; INTRAVENOUS at 11:52

## 2025-01-28 RX ADMIN — FENTANYL CITRATE 50 MCG: 50 INJECTION, SOLUTION INTRAMUSCULAR; INTRAVENOUS at 11:28

## 2025-01-28 RX ADMIN — PROPOFOL 40 MG: 10 INJECTION, EMULSION INTRAVENOUS at 09:28

## 2025-01-28 RX ADMIN — ACETAMINOPHEN 975 MG: 325 TABLET, FILM COATED ORAL at 21:34

## 2025-01-28 RX ADMIN — PROPOFOL 30 MG: 10 INJECTION, EMULSION INTRAVENOUS at 09:21

## 2025-01-28 RX ADMIN — PHENYLEPHRINE HYDROCHLORIDE 0.5 MCG/KG/MIN: 10 INJECTION INTRAVENOUS at 09:42

## 2025-01-28 RX ADMIN — FENTANYL CITRATE 50 MCG: 50 INJECTION INTRAMUSCULAR; INTRAVENOUS at 11:00

## 2025-01-28 RX ADMIN — LIDOCAINE HYDROCHLORIDE 40 MG: 20 INJECTION, SOLUTION INFILTRATION; PERINEURAL at 09:18

## 2025-01-28 RX ADMIN — FENTANYL CITRATE 50 MCG: 50 INJECTION INTRAMUSCULAR; INTRAVENOUS at 09:35

## 2025-01-28 RX ADMIN — DEXAMETHASONE SODIUM PHOSPHATE 10 MG: 4 INJECTION, SOLUTION INTRA-ARTICULAR; INTRALESIONAL; INTRAMUSCULAR; INTRAVENOUS; SOFT TISSUE at 09:25

## 2025-01-28 RX ADMIN — ONDANSETRON 4 MG: 2 INJECTION, SOLUTION INTRAMUSCULAR; INTRAVENOUS at 11:15

## 2025-01-28 RX ADMIN — ACETAMINOPHEN 975 MG: 325 TABLET, FILM COATED ORAL at 14:10

## 2025-01-28 RX ADMIN — ONDANSETRON 4 MG: 2 INJECTION INTRAMUSCULAR; INTRAVENOUS at 10:49

## 2025-01-28 RX ADMIN — MIDAZOLAM 2 MG: 1 INJECTION INTRAMUSCULAR; INTRAVENOUS at 09:12

## 2025-01-28 RX ADMIN — MIDAZOLAM 1 MG: 1 INJECTION INTRAMUSCULAR; INTRAVENOUS at 08:34

## 2025-01-28 RX ADMIN — SODIUM CHLORIDE, POTASSIUM CHLORIDE, SODIUM LACTATE AND CALCIUM CHLORIDE: 600; 310; 30; 20 INJECTION, SOLUTION INTRAVENOUS at 08:08

## 2025-01-28 RX ADMIN — BUPIVACAINE HYDROCHLORIDE IN DEXTROSE 1.8 ML: 7.5 INJECTION, SOLUTION SUBARACHNOID at 09:19

## 2025-01-28 RX ADMIN — MIDAZOLAM 1 MG: 1 INJECTION INTRAMUSCULAR; INTRAVENOUS at 08:31

## 2025-01-28 RX ADMIN — HYDROMORPHONE HYDROCHLORIDE 2 MG: 2 TABLET ORAL at 14:10

## 2025-01-28 RX ADMIN — SENNOSIDES AND DOCUSATE SODIUM 1 TABLET: 50; 8.6 TABLET ORAL at 20:13

## 2025-01-28 RX ADMIN — CEFAZOLIN 1 G: 1 INJECTION, POWDER, FOR SOLUTION INTRAMUSCULAR; INTRAVENOUS at 17:01

## 2025-01-28 RX ADMIN — ASPIRIN 81 MG: 81 TABLET, COATED ORAL at 20:13

## 2025-01-28 RX ADMIN — TRANEXAMIC ACID 1950 MG: 650 TABLET ORAL at 08:07

## 2025-01-28 RX ADMIN — Medication 2 G: at 09:17

## 2025-01-28 RX ADMIN — BUPIVACAINE HYDROCHLORIDE 20 ML: 5 INJECTION, SOLUTION EPIDURAL; INTRACAUDAL; PERINEURAL at 08:45

## 2025-01-28 RX ADMIN — PROPOFOL 150 MCG/KG/MIN: 10 INJECTION, EMULSION INTRAVENOUS at 09:20

## 2025-01-28 ASSESSMENT — ACTIVITIES OF DAILY LIVING (ADL)
ADLS_ACUITY_SCORE: 40
ADLS_ACUITY_SCORE: 37
ADLS_ACUITY_SCORE: 37
ADLS_ACUITY_SCORE: 40
ADLS_ACUITY_SCORE: 37
ADLS_ACUITY_SCORE: 57
ADLS_ACUITY_SCORE: 37
ADLS_ACUITY_SCORE: 40
ADLS_ACUITY_SCORE: 37
ADLS_ACUITY_SCORE: 37
ADLS_ACUITY_SCORE: 40
ADLS_ACUITY_SCORE: 37
ADLS_ACUITY_SCORE: 37
ADLS_ACUITY_SCORE: 40
ADLS_ACUITY_SCORE: 37
ADLS_ACUITY_SCORE: 37
ADLS_ACUITY_SCORE: 40

## 2025-01-28 NOTE — ANESTHESIA CARE TRANSFER NOTE
Patient: Nila Viveros    Procedure: Procedure(s):  Left Total Knee Arthroplasty       Diagnosis: Osteoarthritis [M19.90]  Osteoarthritis of left knee [M17.12]  Diagnosis Additional Information: No value filed.    Anesthesia Type:   Spinal     Note:    Oropharynx: oropharynx clear of all foreign objects and spontaneously breathing  Level of Consciousness: awake  Oxygen Supplementation: face mask  Level of Supplemental Oxygen (L/min / FiO2): 6  Independent Airway: airway patency satisfactory and stable  Dentition: dentition unchanged  Vital Signs Stable: post-procedure vital signs reviewed and stable  Report to RN Given: handoff report given  Patient transferred to: PACU    Handoff Report: Identifed the Patient, Identified the Reponsible Provider, Reviewed the pertinent medical history, Discussed the surgical course, Reviewed Intra-OP anesthesia mangement and issues during anesthesia, Set expectations for post-procedure period and Allowed opportunity for questions and acknowledgement of understanding      Vitals:  Vitals Value Taken Time   BP 95/55 01/28/25 1115   Temp 36.3  C (97.34  F) 01/28/25 1117   Pulse 56 01/28/25 1117   Resp 11 01/28/25 1117   SpO2 97 % 01/28/25 1117       Electronically Signed By: HA House CRNA  January 28, 2025  11:18 AM

## 2025-01-28 NOTE — ANESTHESIA PROCEDURE NOTES
"Adductor canal Procedure Note    Pre-Procedure   Staff -        Anesthesiologist:  Rohan Camara MD       Performed By: anesthesiologist       Location: pre-op       Pre-Anesthestic Checklist: patient identified, IV checked, site marked, risks and benefits discussed, informed consent, monitors and equipment checked, pre-op evaluation, at physician/surgeon's request and post-op pain management  Timeout:       Correct Patient: Yes        Correct Procedure: Yes        Correct Site: Yes        Correct Position: Yes        Correct Laterality: Yes        Site Marked: Yes  Procedure Documentation  Procedure: Adductor canal         Laterality: left       Patient Position: supine       Patient Prep/Sterile Barriers: mask       Skin prep: Chloraprep       Local skin infiltrated with mL of 1% lidocaine.        Needle Type: short bevel       Needle Gauge: 21.        Needle Length (Inches): 4        Ultrasound guided       1. Ultrasound was used to identify targeted nerve, plexus, vascular marker, or fascial plane and place a needle adjacent to it in real-time.       2. Ultrasound was used to visualize the spread of anesthetic in close proximity to the above referenced structure.       3. A permanent image is entered into the patient's record.       4. The visualized anatomic structures appeared normal.       5. There were no apparent abnormal pathologic findings.    Assessment/Narrative         The placement was negative for: blood aspirated and painful injection       Paresthesias: No.       Bolus given via needle..        Secured via.        Insertion/Infusion Method: Single Shot       Complications: none       Injection made incrementally with aspirations every 5 mL.    Medication(s) Administered   Bupivacaine 0.5% w/ 1:400K Epi (Injection) - Injection   20 mL - 1/28/2025 8:45:00 AM    FOR [facility] ONLY:   Pain Team Contact information: please page the Pain Team Via [employer]. Search \"Pain\". During daytime " hours, please page the attending first. At night please page the resident first.

## 2025-01-28 NOTE — ANESTHESIA PROCEDURE NOTES
Airway       Patient location during procedure: OR  Staff -        Anesthesiologist:  Rohan Camara MD       CRNA: Eliseo Ramírez APRN CRNA       Performed By: CRNA  Consent for Airway        Urgency: elective  Indications and Patient Condition       Indications for airway management: bisi-procedural       Induction type:intravenous       Mask difficulty assessment: 0 - not attempted    Final Airway Details       Final airway type: supraglottic airway    Supraglottic Airway Details        Type: LMA       Brand: I-Gel       LMA size: 4    Post intubation assessment        Placement verified by: capnometry and chest rise        Number of attempts at approach: 1       Number of other approaches attempted: 0       Secured with: tape       Ease of procedure: easy       Dentition: Unchanged

## 2025-01-28 NOTE — ANESTHESIA POSTPROCEDURE EVALUATION
Patient: Nila Viveros    Procedure: Procedure(s):  Left Total Knee Arthroplasty       Anesthesia Type:  Spinal    Note:  Disposition: Inpatient   Postop Pain Control: Uneventful            Sign Out: Well controlled pain   PONV: No   Neuro/Psych: Uneventful            Sign Out: Acceptable/Baseline neuro status   Airway/Respiratory: Uneventful            Sign Out: Acceptable/Baseline resp. status   CV/Hemodynamics: Uneventful            Sign Out: Acceptable CV status; No obvious hypovolemia; No obvious fluid overload   Other NRE: NONE   DID A NON-ROUTINE EVENT OCCUR? No       Last vitals:  Vitals Value Taken Time   /97 01/28/25 1300   Temp 36.6  C (97.88  F) 01/28/25 1317   Pulse 71 01/28/25 1318   Resp 16 01/28/25 1318   SpO2 99 % 01/28/25 1318   Vitals shown include unfiled device data.    Electronically Signed By: Rohan Camara MD  January 28, 2025  1:19 PM

## 2025-01-28 NOTE — OP NOTE
Procedure Date: January 28, 2025    PATIENT: Nila Viveros  1955     PREOPERATIVE DIAGNOSIS:  Left knee advanced osteoarthritis. NICKEL SENSITIVE.     POSTOPERATIVE DIAGNOSIS:  Left knee advanced osteoarthritis. NICKEL SENSITIVE.     PROCEDURE:  Left total knee arthroplasty.     SURGEON:  Darshan Caban MD     ASSISTANT:  Ava Buitrago PA-C     COMPLICATIONS:  None.     ESTIMATED BLOOD LOSS:  30 mL.     IMPLANTS:  [employer] system:  1.  Size 5 narrow, left, PS femoral component, oxinium  2.  Size 4, left standard tibial baseplate.  3.  Size 12, 3/4, high flex PS XLPE polyethylene.  4.  Size 32 concentric polyethylene patella.     DESCRIPTION OF PROCEDURE:  The patient was brought to the operating room January 28, 2025 for left total knee arthroplasty.  Patient has a history of advanced osteoarthritis of the knee refractory to conservative measures. Patient was seen on the day of surgery in the preoperative holding area.  Left knee marked as the correct operative site.  Received a dose of IV antibiotic less than 30 minutes prior to surgical incision.  Post-surgical antibiotic and DVT prophylaxis are indicated and will be prescribed.  Skilled assistant was used for positioning, draping, retraction, closure, dressing application. Regional block administered by anesthesia.     The patient was brought to the operating room and placed under a spinal anesthesia.  Anesthesia converted to general due to sub optimal spinal set up.  The operative lower extremity was prepped and draped in the standard sterile fashion.  Preoperative timeout was taken.  Thigh tourniquet inflated to 225 mmHg.  Anterior longitudinal surgical incision was made.  Medial parapatellar arthrotomy was performed.  Advanced tricompartmental osteoarthritis confirmed.  Marginal osteophytes debrided.  Synovectomy performed.  Patella was cut to a thickness of 14 mm and sized to a 32.  Femur prepared with an intramedullary guide leslie  and a 4-degree valgus cutting guide.  I took an extra 2 mm of distal femur and sized the cut distal femur to a 5 narrow.  Prepared in the appropriate rotational alignment.    The tibia was prepared using an extramedullary cutting guide, taking 7 mm of bone and cartilage off the lateral side.  The cut proximal tibia was sized to a 4 and was punched in the appropriate rotational alignment centered on the medial third of the tibial tubercle.  Posterior osteophytes and menisci were removed.  Posterior capsule injected with a cocktail of Toradol, ropivacaine and saline.    With trial components in place and a size 12 trial PS polyethylene, I was pleased with range of motion, stability, and gap balancing.  Trial components were removed.  Jet lavage irrigation performed.  Components listed above were cemented into place using 1 batch of high viscosity Simplex cement without antibiotic.  Once the cement had hardened and all extruded cement removed, I implanted the size 12 XLPE PS polyethylene for the 4 baseplate.    Tourniquet deflated.  Hemostasis achieved.  Betadine wash performed.  Jet lavage irrigation performed.  Arthrotomy was closed with interrupted Ethibond sutures over 1 g of vancomycin powder.  Superficial soft tissues irrigated and closed in layers.  A sterile dressing was applied.  The patient was brought to recovery in stable condition.  Needle and lap counts were correct at the end of the case.  There were no known complications.    Additional intraoperative findings of note: Patient has a documented NICKEL allergy. I, therefore, used an oxinium femoral component as outlined above    Special postsurgical patient care factors: None     Darshan Caban MD

## 2025-01-28 NOTE — BRIEF OP NOTE
[facility]    Brief Operative Note    Pre-operative diagnosis: Osteoarthritis [M19.90]  Osteoarthritis of left knee [M17.12]  Post-operative diagnosis Same as pre-operative diagnosis    Procedure: Left Total Knee Arthroplasty, Left - Knee    Surgeon: Surgeons and Role:     * Darshan Caban MD - Primary     * Ava Buitrago PA-C - Assisting  Anesthesia: General   Estimated Blood Loss: Less than 50 ml    Drains: None  Specimens: * No specimens in log *  Findings:   None.  Complications: None.  Implants:   Implant Name Type Inv. Item Serial No.  Lot No. LRB No. Used Action   IMP BONE CEMENT STRK SIMPLEX HV FULL DOSE 6194-1-001 - [id number] Cement, Bone IMP BONE CEMENT STRK SIMPLEX HV FULL DOSE 6194-1-001  [employer] [id number] Left 1 Implanted   IMP COMP FEM S&N LEGION PS OXIN NARROW PS SZ5N LT [id number] - [id number] Total Joint Component/Insert IMP COMP FEM S&N LEGION PS OXIN NARROW PS SZ5N LT [id number]  [employer] [id number] Left 1 Implanted   IMP COMP PATELLA SNR LAURY II 9X32MM [id number] - [id number] Total Joint Component/Insert IMP COMP PATELLA SNR LAURY II 9X32MM [id number]  [employer]R [id number] Left 1 Implanted   IMP BASEPLATE TIBIAL LAURY II SZ 4 LT TI [id number] - [id number] Total Joint Component/Insert IMP BASEPLATE TIBIAL ALURY II SZ 4 LT TI [id number]  [employer] [id number] Left 1 Implanted   IMP COMP KNEE S&N LEGION PS HI-FLEX XLPE SZ3-4 12MM [id number] - [id number] Total Joint Component/Insert IMP COMP KNEE S&N LEGION PS HI-FLEX XLPE SZ3-4 12MM [id number]  [employer] [id number] Left 1 Implanted

## 2025-01-28 NOTE — ANESTHESIA PROCEDURE NOTES
"Intrathecal Procedure Note    Pre-Procedure   Staff -        Anesthesiologist:  Rohan Camara MD       Performed By: anesthesiologist       Location: OR       Pre-Anesthestic Checklist: patient identified, IV checked, risks and benefits discussed, informed consent, monitors and equipment checked and pre-op evaluation  Timeout:       Correct Patient: Yes        Correct Procedure: Yes        Correct Site: Yes        Correct Position: Yes   Procedure Documentation  Procedure: intrathecal         Patient Position: sitting       Patient Prep/Sterile Barriers: sterile gloves, mask, patient draped       Skin prep: Betadine       Insertion Site: L3-4. (midline approach).       Needle Gauge: 24.        Needle Length (Inches): 3.5        Spinal Needle Type: Alecia-Larry       Introducer used       # of attempts: 1 and  # of redirects:     Assessment/Narrative         Paresthesias: No.       CSF fluid: clear.    Medication(s) Administered   0.75% Hyperbaric Bupivacaine (Intrathecal) - Intrathecal   1.8 mL - 1/28/2025 9:19:00 AM   Comments:  No pain with injection, questions answered about procedure      FOR [facility] ([facility]) ONLY:   Pain Team Contact information: please page the Pain Team Via [facility] [facility]. Search \"Pain\". During daytime hours, please page the attending first. At night please page the resident first.      "

## 2025-01-29 ENCOUNTER — APPOINTMENT (OUTPATIENT)
Dept: PHYSICAL THERAPY | Facility: CLINIC | Age: 70
End: 2025-01-29
Attending: ORTHOPAEDIC SURGERY
Payer: COMMERCIAL

## 2025-01-29 LAB
FASTING STATUS PATIENT QL REPORTED: YES
GLUCOSE SERPL-MCNC: 102 MG/DL (ref 70–99)
HGB BLD-MCNC: 10.4 G/DL (ref 11.7–15.7)

## 2025-01-29 PROCEDURE — 250N000011 HC RX IP 250 OP 636: Performed by: ORTHOPAEDIC SURGERY

## 2025-01-29 PROCEDURE — 250N000013 HC RX MED GY IP 250 OP 250 PS 637: Performed by: ORTHOPAEDIC SURGERY

## 2025-01-29 PROCEDURE — 36415 COLL VENOUS BLD VENIPUNCTURE: CPT | Performed by: ORTHOPAEDIC SURGERY

## 2025-01-29 PROCEDURE — 97530 THERAPEUTIC ACTIVITIES: CPT | Mod: GP | Performed by: PHYSICAL THERAPIST

## 2025-01-29 PROCEDURE — 97116 GAIT TRAINING THERAPY: CPT | Mod: GP | Performed by: PHYSICAL THERAPIST

## 2025-01-29 PROCEDURE — 85018 HEMOGLOBIN: CPT | Performed by: ORTHOPAEDIC SURGERY

## 2025-01-29 PROCEDURE — 82947 ASSAY GLUCOSE BLOOD QUANT: CPT | Performed by: ORTHOPAEDIC SURGERY

## 2025-01-29 PROCEDURE — 250N000013 HC RX MED GY IP 250 OP 250 PS 637: Performed by: PHYSICIAN ASSISTANT

## 2025-01-29 RX ORDER — CALCITRIOL 0.25 UG/1
0.25 CAPSULE, LIQUID FILLED ORAL DAILY
Status: DISCONTINUED | OUTPATIENT
Start: 2025-01-29 | End: 2025-01-30 | Stop reason: HOSPADM

## 2025-01-29 RX ORDER — VITAMIN B COMPLEX
25 TABLET ORAL DAILY
Status: DISCONTINUED | OUTPATIENT
Start: 2025-01-29 | End: 2025-01-30 | Stop reason: HOSPADM

## 2025-01-29 RX ORDER — CALCITRIOL 0.25 UG/1
0.25 CAPSULE, LIQUID FILLED ORAL DAILY
Status: DISCONTINUED | OUTPATIENT
Start: 2025-01-29 | End: 2025-01-29

## 2025-01-29 RX ORDER — IBUPROFEN 200 MG
1 CAPSULE ORAL DAILY
Status: DISCONTINUED | OUTPATIENT
Start: 2025-01-29 | End: 2025-01-29

## 2025-01-29 RX ADMIN — HYDROXYZINE HYDROCHLORIDE 10 MG: 10 TABLET ORAL at 15:17

## 2025-01-29 RX ADMIN — HYDROMORPHONE HYDROCHLORIDE 2 MG: 2 TABLET ORAL at 09:03

## 2025-01-29 RX ADMIN — ACETAMINOPHEN 650 MG: 325 TABLET, FILM COATED ORAL at 10:46

## 2025-01-29 RX ADMIN — ACETAMINOPHEN 975 MG: 325 TABLET, FILM COATED ORAL at 21:51

## 2025-01-29 RX ADMIN — Medication 600 MG: at 13:51

## 2025-01-29 RX ADMIN — HYDROMORPHONE HYDROCHLORIDE 4 MG: 2 TABLET ORAL at 19:10

## 2025-01-29 RX ADMIN — ACETAMINOPHEN 975 MG: 325 TABLET, FILM COATED ORAL at 15:16

## 2025-01-29 RX ADMIN — PROCHLORPERAZINE MALEATE 5 MG: 5 TABLET ORAL at 19:10

## 2025-01-29 RX ADMIN — ASPIRIN 81 MG: 81 TABLET, COATED ORAL at 21:07

## 2025-01-29 RX ADMIN — LISINOPRIL 20 MG: 20 TABLET ORAL at 08:10

## 2025-01-29 RX ADMIN — ONDANSETRON 4 MG: 4 TABLET, ORALLY DISINTEGRATING ORAL at 15:17

## 2025-01-29 RX ADMIN — ASPIRIN 81 MG: 81 TABLET, COATED ORAL at 08:10

## 2025-01-29 RX ADMIN — Medication 25 MCG: at 10:23

## 2025-01-29 RX ADMIN — HYDROMORPHONE HYDROCHLORIDE 4 MG: 2 TABLET ORAL at 23:40

## 2025-01-29 RX ADMIN — Medication 600 MG: at 10:27

## 2025-01-29 RX ADMIN — HYDROMORPHONE HYDROCHLORIDE 2 MG: 2 TABLET ORAL at 03:54

## 2025-01-29 RX ADMIN — CALCITRIOL CAPSULES 0.25 MCG 0.25 MCG: 0.25 CAPSULE ORAL at 13:51

## 2025-01-29 RX ADMIN — HYDROMORPHONE HYDROCHLORIDE 2 MG: 2 TABLET ORAL at 13:53

## 2025-01-29 RX ADMIN — CEFAZOLIN 1 G: 1 INJECTION, POWDER, FOR SOLUTION INTRAMUSCULAR; INTRAVENOUS at 01:02

## 2025-01-29 RX ADMIN — SENNOSIDES AND DOCUSATE SODIUM 1 TABLET: 50; 8.6 TABLET ORAL at 08:11

## 2025-01-29 RX ADMIN — HYDROXYZINE HYDROCHLORIDE 10 MG: 10 TABLET ORAL at 21:07

## 2025-01-29 RX ADMIN — POLYETHYLENE GLYCOL 3350 17 G: 17 POWDER, FOR SOLUTION ORAL at 08:10

## 2025-01-29 RX ADMIN — Medication 600 MG: at 21:51

## 2025-01-29 RX ADMIN — HYDROMORPHONE HYDROCHLORIDE 2 MG: 2 TABLET ORAL at 00:06

## 2025-01-29 ASSESSMENT — ACTIVITIES OF DAILY LIVING (ADL)
ADLS_ACUITY_SCORE: 40
ADLS_ACUITY_SCORE: 43
ADLS_ACUITY_SCORE: 40
ADLS_ACUITY_SCORE: 43
DEPENDENT_IADLS:: INDEPENDENT
ADLS_ACUITY_SCORE: 40
ADLS_ACUITY_SCORE: 40

## 2025-01-29 NOTE — PROGRESS NOTES
Patient vital signs are at baseline: Yes  Patient able to ambulate as they were prior to admission or with assist devices provided by therapies during their stay:  Yes  Patient MUST void prior to discharge:  Yes  Patient able to tolerate oral intake:  Yes  Pain has adequate pain control using Oral analgesics:  Yes  Does patient have an identified :  Yes  Has goal D/C date and time been discussed with patient:  Yes     A&Ox4, up with 1 w/ GB&walker, voiding in bathroom, pain controlled with po dilaudid

## 2025-01-29 NOTE — PROVIDER NOTIFICATION
MD Notification    Notified Person:  Ortho PAC    Notified Person Name: Dakotah Vargas    Notification Date/Time: 01/29/2025 @ 0952    Notification Interaction: Phone    Purpose of Notification: Patient is requesting her PTA meds- Calcium, calcitriol and Vitamin D    Orders Received: Received Telephone order read back. Order in place.     Comments:

## 2025-01-29 NOTE — PLAN OF CARE
Goal Outcome Evaluation:         Patient vital signs are at baseline: Yes  Patient able to ambulate as they were prior to admission or with assist devices provided by therapies during their stay:  No,  Reason: Patient had a syncopal episode x1 during PT session this morning  Patient MUST void prior to discharge:  Yes  Patient able to tolerate oral intake:  Yes  Pain has adequate pain control using Oral analgesics:  Yes- PO dilaudid, tylenol, atarax and ice application  Does patient have an identified :  No, Reason: Discharging to TCU   Has goal D/C date and time been discussed with patient:  No,  Reason:  pending TCU placement      Patient is A&O x4, anxious @ times. Up with one assist/gb/walker, had an episode of syncope during PT session this morning (see flowsheet for vitals- hx of vasovagal attack), had few BM today, intermittent nausea managed with Zofran and Compazine. Ambulating to bathroom frequently- denies lightheadedness or dizziness.

## 2025-01-29 NOTE — PROGRESS NOTES
01/28/25 1900   Appointment Info   Signing Clinician's Name / Credentials (PT) Red Lr DPT   Rehab Comments (PT) WBAT LLE, ROM as jennifer. Pt is hoping to go to TCU as she lives alone. Pt did have R TKA in October of 2023 and ended up at TCU after DC. Can likely defer OT to next level of care at TCU   Living Environment   People in Home alone   Current Living Arrangements house   Home Accessibility stairs within home   Number of Stairs, Within Home, Primary greater than 10 stairs   Stair Railings, Within Home, Primary railings safe and in good condition   Transportation Anticipated family or friend will provide   Living Environment Comments lives alone with 13 steps to bedroom   Self-Care   Usual Activity Tolerance good   Current Activity Tolerance fair   Equipment Currently Used at Home cane, straight;shower chair;grab bar, toilet;walker, rolling   Fall history within last six months no   Activity/Exercise/Self-Care Comment Pt IND with mobility and ADL's at baseline, had been using cane and sometimes walker last few months with increasing knee pain   General Information   Onset of Illness/Injury or Date of Surgery 01/28/25   Referring Physician Darshan Caban MD   Patient/Family Therapy Goals Statement (PT) pt wants to go to TCU as she lives alone and has hx of sudden syncope   Pertinent History of Current Problem (include personal factors and/or comorbidities that impact the POC) 69 y.o. female with hx of vasovagal syncope, recent hospitalization due to syncope, s/p L TKA on 1/28/25, POD#0   Existing Precautions/Restrictions fall   Weight-Bearing Status - LLE weight-bearing as tolerated   Weight-Bearing Status - RLE full weight-bearing   Cognition   Affect/Mental Status (Cognition) anxious;WFL   Orientation Status (Cognition) oriented x 4   Follows Commands (Cognition) WNL   Pain Assessment   Patient Currently in Pain Yes, see Vital Sign flowsheet   Integumentary/Edema   Integumentary/Edema no deficits  were identifed   Posture    Posture Not impaired   Range of Motion (ROM)   Range of Motion ROM deficits secondary to surgical procedure;ROM deficits secondary to pain;ROM deficits secondary to weakness   ROM Comment L knee AROM ~5-60 limited by pain   Strength (Manual Muscle Testing)   Strength (Manual Muscle Testing) Able to perform R SLR;Able to perform L SLR;Deficits observed during functional mobility   Strength Comments weakness in L knee but able to demo good quad contraction and SLR, strength grossly >3+/5   Bed Mobility   Comment, (Bed Mobility) SBA   Transfers   Comment, (Transfers) CGA with FWW   Gait/Stairs (Locomotion)   [place] Level (Gait) contact guard   Assistive Device (Gait) walker, front-wheeled   Distance in Feet (Gait) 5'   Pattern (Gait) step-through   Deviations/Abnormal Patterns (Gait) antalgic   Maintains Weight-bearing Status (Gait) able to maintain   Balance   Balance Comments static standing balance in FWW okay with CGA, deficits with dynamic balance but able to walk short dist with walker and asst x 1   Sensory Examination   Sensory Perception patient reports no sensory changes   Clinical Impression   Criteria for Skilled Therapeutic Intervention Yes, treatment indicated   PT Diagnosis (PT) impaired gait   Influenced by the following impairments pain, deficits with ROM, strength, balance   Functional limitations due to impairments decreased ind with functional moblity   Clinical Presentation (PT Evaluation Complexity) stable   Clinical Presentation Rationale PMH and clinical judgement   Clinical Decision Making (Complexity) low complexity   Planned Therapy Interventions (PT) balance training;bed mobility training;cryotherapy;gait training;home exercise program;patient/family education;ROM (range of motion);stair training;strengthening;transfer training;progressive activity/exercise   Risk & Benefits of therapy have been explained evaluation/treatment results reviewed;care  plan/treatment goals reviewed;risks/benefits reviewed;current/potential barriers reviewed;participants voiced agreement with care plan;participants included;patient;daughter   PT Total Evaluation Time   PT Eval, Low Complexity Minutes (31977) 10   Therapy Certification   Start of care date 01/28/25   Certification date from 01/28/25   Certification date to 02/04/25   Medical Diagnosis L TKA   [facility] Authorization Information   Condition type Chronic (continuous duration <3 months)   Cause of current episode Unspecified   Nature of treatment Rehabilitative   Functional ability Moderate functional limitations   Documented POC (choose all that apply) Measurable short and long term/discharge treatment goals related to physical and functional deficits.;Frequency of treatment visits and treatment activities to address deficit areas.;Patient agrees to program participation including home program   Briefly describe symptoms pain, weakness and ROM deficits in L knee following TKA   How did the symptoms start insidious onset of symptoms over time   Last 24H: avg pain/symptom intensity  4/10   Past wk: avg pain/symptom intensity 4/10   Frequency of Symptoms Constantly (% of the time)   Symptom impact on ADLs Moderately   Condition change since eval N/A (first visit)   General health reported by patient Good   Physical Therapy Goals   PT Frequency 2x/day   PT Predicted Duration/Target Date for Goal Attainment 01/29/25   PT Goals Bed Mobility;Transfers;Gait;Stairs   PT: Bed Mobility Independent;Supine to/from sit   PT: Transfers Modified independent;Sit to/from stand;Assistive device   PT: Gait Modified independent;Rolling walker;100 feet   PT: Stairs Greater than 10 stairs;Rail on left  (13 steps to bedroom)   Interventions   Interventions Quick Adds Gait Training;Therapeutic Activity;Therapeutic Procedure   Therapeutic Procedure/Exercise   Ther. Procedure: strength, endurance, ROM, flexibillity Minutes (16669) 5   Symptoms  "Noted During/After Treatment increased pain   Treatment Detail/Skilled Intervention Educated on post surgical benefits of TE on circulation, functional ROM, and strength. Left pt with TE handout. With pt in supine cued for AP's x 20, on LLE: quad sets x 10, heel slides x 10, SLR x 2. Pt tolerated all TE well   Therapeutic Activity   Therapeutic Activities: dynamic activities to improve functional performance Minutes (73454) 15   Symptoms Noted During/After Treatment Increased pain   Treatment Detail/Skilled Intervention Greeted pt supine in bed with daughter present. Eval completed. Educated on orders for WBAT, ROM to flexion tolerance, and importance of keeping knee straight when resting and demonstrated how to do so. Pt a little anxious about history of vasovagal syncope, therapist listened to concerns, provided reassurance, and allowed pt to progress at her own speed. With HOB elevated supine<>sit SBA, min cueing provided for sequencing. Pt able to reposition in bed IND. Performed Sit<>stand x 3 with FWW, cues for safe walker and hand placement and pt able to demo back well. After first STS pt stood ~30 seconds and felt her \"lips tingling\" and sat down quickly, described symptom as what she feels sometimes when she is about to pass out. BP taken with pt sitting and read 158/101, pt not feeling dizzy or light headed, was able to continue with activity. Increased time for line management and room set up   Gait Training   Gait Training Minutes (87717) 6   Symptoms Noted During/After Treatment (Gait Training) fatigue;increased pain   Treatment Detail/Skilled Intervention Pt performed 2 rounds of pre-gait, on first STS with pt in walker and CGA cued for weight shifting on/off LLE as pt nervous about WB, able to clear feet with stepping in place, no buckling. Pt performed same next time standing, then amb around room ~10' using FWW and CGA, very slow gait speed, step through pattern, pt feeling like her muscles were " getting tired and shaking, reassured pt normal when first getting up after surgery, when pt walked a few more feet reported feeling muscles calm down. No LOB noted   Distance in Feet 10'   PT Discharge Planning   PT Plan review/progress HEP, progress gait with FWW, trial steps   PT Discharge Recommendation (DC Rec)   (defer to ortho)   PT Rationale for DC Rec Pt is below baseline, was able to walk short distance in room tonight with CGA. Pt lives alone and will not have help at discharge and has 13 steps to navigate. Pt would need to be IND/Mod-I with all mobility in order to discharge home safely. Anticiapte pt to continue to progress with further IP PT but pt would likely need TCU at discharge to ensure safety and independence prior to returning home.   PT Brief overview of current status CGA with FWW - Goals of therapy will be to address safe mobility and make recs for d/c to next level of care. Pt and RN will continue to follow all falls risk precautions as documented by RN staff while hospitalized   PT Total Distance Amb During Session (feet) 15   Physical Therapy Time and Intention   Timed Code Treatment Minutes 26   Total Session Time (sum of timed and untimed services) 36       [facility]                                                                                   OUTPATIENT PHYSICAL THERAPY    PLAN OF TREATMENT FOR OUTPATIENT REHABILITATION   Patient's Last Name, First Name, Nila Lizama YOB: 1955   Provider's Name   [facility]   Medical Record No.  [id number]     Onset Date: 01/28/25 Start of Care Date: 01/28/25     Medical Diagnosis:  L TKA               PT Diagnosis:  impaired gait Certification Dates:  From: 01/28/25  To: 02/04/25       See note for plan of treatment, functional goals, and certification details.    I CERTIFY THE NEED FOR THESE SERVICES FURNISHED UNDER        THIS PLAN OF TREATMENT AND WHILE  UNDER MY CARE (Physician co-signature of this document indicates review and certification of the therapy plan).

## 2025-01-29 NOTE — CONSULTS
"Care Management Initial Consult    General Information  Assessment completed with: Patient (pt and pt's daughter Katherine),    Type of CM/SW Visit: Initial Assessment    Primary Care Provider verified and updated as needed:     Readmission within the last 30 days:        Reason for Consult: discharge planning  Advance Care Planning:            Communication Assessment  Patient's communication style: spoken language (English or Bilingual)    Hearing Difficulty or Deaf: no   Wear Glasses or Blind: yes    Cognitive  Cognitive/Neuro/Behavioral: WDL                      Living Environment:   People in home: alone     Current living Arrangements: house      Able to return to prior arrangements: no       Family/Social Support:  Care provided by: self  Provides care for: no one  Marital Status: Single  Support system: Children          Description of Support System:           Current Resources:   Patient receiving home care services: No        Community Resources: None  Equipment currently used at home:  (pt was independent with no DME over a month ago; this most rcent month she was needing a cane due to mobility and pain)  Supplies currently used at home:      Employment/Financial:  Employment Status: other (see comments) (pt states that she is not working and is \"mostly\" retired per pt.)        Financial Concerns:             Does the patient's insurance plan have a 3 day qualifying hospital stay waiver?  No    Lifestyle & Psychosocial Needs:  Social Drivers of Health     Food Insecurity: No Food Insecurity (4/7/2021)    [facility]    Hunger Vital Sign     Worried About Running Out of Food in the Last Year: Never true     Ran Out of Food in the Last Year: Never true   Depression: Not at risk (3/11/2024)    Received from [facility] [employer] [facility], [facility]    PHQ-2     PHQ-2 TOTAL SCORE: 1   Housing Stability: Not on file   Tobacco Use: " Low Risk  (1/28/2025)    Patient History     Smoking Tobacco Use: Never     Smokeless Tobacco Use: Never     Passive Exposure: Never   Financial Resource Strain: Low Risk  (4/7/2021)    Received from [facility]    Overall Financial Resource Strain (CARDIA)     Difficulty of Paying Living Expenses: Not hard at all   Alcohol Use: Unknown (4/7/2021)    Received from [facility]    AUDIT-C     Frequency of Alcohol Consumption: 2-4 times a month     Average Number of Drinks: Not on file     Frequency of Binge Drinking: Not on file   Transportation Needs: No Transportation Needs (4/7/2021)    Received from [facility]    PRAPARE - Transportation     Lack of Transportation (Medical): No     Lack of Transportation (Non-Medical): No   Physical Activity: Not on file   Interpersonal Safety: Low Risk  (1/28/2025)    Interpersonal Safety     Do you feel physically and emotionally safe where you currently live?: Yes     Within the past 12 months, have you been hit, slapped, kicked or otherwise physically hurt by someone?: No     Within the past 12 months, have you been humiliated or emotionally abused in other ways by your partner or ex-partner?: No   Stress: Not on file   Social Connections: Unknown (9/22/2022)    [facility] [employer] [facility] [facility]    Social Connections     Frequency of Communication with Friends and Family: Not on file   Health Literacy: Not on file       Functional Status:  Prior to admission patient needed assistance:   Dependent ADLs:: Independent  Dependent IADLs:: Independent       Mental Health Status:          Chemical Dependency Status:                Values/Beliefs:  Spiritual, Cultural Beliefs, [employer]ism Practices, Values that affect care:                 Discussed  Partnership in Safe Discharge Planning  document with patient/family: No    Additional  Information:  Pt is a 69 year old female who was admitted to the hospital for Left total knee arthroplasty per Dr. Caban note on 1/28/25.    Writer is informed by PT that they do not feel pt is safe to go home and they are recommending TCU.     Writer did discuss pt's case with . SW supervisor states that case is to be treated as a typical initial/ consult.    Writer met with pt at bedside. Pt's daughter Katherine is also present. Introduced self and role. Pt states that she lives at home alone. Pt states that her home is a two level home. Pt stats that there are 13 steps for her to climb to be able to get to the main living area where her room and everything necessary is at. Pt states that she has been having vertigo and states that she is not doing well. Pt states that she does not feel safe going home and is in agreement to TCU. Pt states she had gone to [facility] in the past and would like a referral there. Writer did inform pt that at minimum three referrals must  be sent. Writer did provide pt a list of TCU facilities for [facility]. Pt and pt's daughter looked over the list and decided they would like referrals sent for [facility], [facility] and [facility]. Writer informed pt that writer will send referrals today. Pt did ask regarding insurance. Writer informed pt that care management will have to talk to TCU to have them check insurance coverage as writer is not certain what this may be due to pt being out pt status. Pt states understanding. Writer informed them that care management will follow up when care management hears back from TCU's.  Discussed transportation with pt and Katherine. Pt and Katherine state that likely family will transport pt. Pt and katherine state no questions or concerns at this time.   Writer sent referrals.     Next Steps: awaiting accepting tcu.     RAMOS [place]  Social Work  [facility]

## 2025-01-29 NOTE — PROGRESS NOTES
01/28/25 1900   Appointment Info   Signing Clinician's Name / Credentials (PT) Red Lr DPT   Quick Adds   Quick Adds [facility] Auth & Certification   Living Environment   People in Home alone   Current Living Arrangements house   Home Accessibility stairs within home   Number of Stairs, Within Home, Primary greater than 10 stairs   Stair Railings, Within Home, Primary railings safe and in good condition   Transportation Anticipated family or friend will provide   Living Environment Comments lives alone with 13 steps to bedroom   Self-Care   Usual Activity Tolerance good   Current Activity Tolerance fair   Equipment Currently Used at Home cane, straight;shower chair;grab bar, toilet;walker, rolling   Fall history within last six months no   Activity/Exercise/Self-Care Comment Pt IND with mobility and ADL's at baseline, had been using cane and sometimes walker last few months with increasing knee pain   General Information   Onset of Illness/Injury or Date of Surgery 01/28/25   Referring Physician Darshan Caban MD   Patient/Family Therapy Goals Statement (PT) pt wants to go to TCU as she lives alone and has hx of sudden syncope   Pertinent History of Current Problem (include personal factors and/or comorbidities that impact the POC) 69 y.o. female with hx of vasovagal syncope, recent hospitalization due to syncope, s/p L TKA on 1/28/25, POD#0   Existing Precautions/Restrictions fall   Weight-Bearing Status - LLE weight-bearing as tolerated   Weight-Bearing Status - RLE full weight-bearing   Cognition   Affect/Mental Status (Cognition) anxious;WFL   Orientation Status (Cognition) oriented x 4   Follows Commands (Cognition) WNL   Pain Assessment   Patient Currently in Pain Yes, see Vital Sign flowsheet   Integumentary/Edema   Integumentary/Edema no deficits were identifed   Posture    Posture Not impaired   Range of Motion (ROM)   Range of Motion ROM deficits secondary to surgical procedure;ROM deficits  secondary to pain;ROM deficits secondary to weakness   ROM Comment L knee AROM ~5-60 limited by pain   Strength (Manual Muscle Testing)   Strength (Manual Muscle Testing) Able to perform R SLR;Able to perform L SLR;Deficits observed during functional mobility   Strength Comments weakness in L knee but able to demo good quad contraction and SLR, strength grossly >3+/5   Bed Mobility   Comment, (Bed Mobility) SBA   Transfers   Comment, (Transfers) CGA with FWW   Gait/Stairs (Locomotion)   [place] Level (Gait) contact guard   Assistive Device (Gait) walker, front-wheeled   Distance in Feet (Gait) 5'   Pattern (Gait) step-through   Deviations/Abnormal Patterns (Gait) antalgic   Maintains Weight-bearing Status (Gait) able to maintain   Balance   Balance Comments static standing balance in FWW okay with CGA, deficits with dynamic balance but able to walk short dist with walker and asst x 1   Sensory Examination   Sensory Perception patient reports no sensory changes   Clinical Impression   Criteria for Skilled Therapeutic Intervention Yes, treatment indicated   PT Diagnosis (PT) impaired gait   Influenced by the following impairments pain, deficits with ROM, strength, balance   Functional limitations due to impairments decreased ind with functional moblity   Clinical Presentation (PT Evaluation Complexity) stable   Clinical Presentation Rationale PMH and clinical judgement   Clinical Decision Making (Complexity) low complexity   Planned Therapy Interventions (PT) balance training;bed mobility training;cryotherapy;gait training;home exercise program;patient/family education;ROM (range of motion);stair training;strengthening;transfer training;progressive activity/exercise   Risk & Benefits of therapy have been explained evaluation/treatment results reviewed;care plan/treatment goals reviewed;risks/benefits reviewed;current/potential barriers reviewed;participants voiced agreement with care plan;participants  included;patient;daughter   PT Total Evaluation Time   PT Eval, Low Complexity Minutes (56504) 10   Therapy Certification   Start of care date 01/28/25   Certification date from 01/28/25   Certification date to 02/04/25   Medical Diagnosis L TKA   [facility] Authorization Information   Condition type Chronic (continuous duration <3 months)   Cause of current episode Unspecified   Nature of treatment Rehabilitative   Functional ability Moderate functional limitations   Documented POC (choose all that apply) Measurable short and long term/discharge treatment goals related to physical and functional deficits.;Frequency of treatment visits and treatment activities to address deficit areas.;Patient agrees to program participation including home program   Briefly describe symptoms pain, weakness and ROM deficits in L knee following TKA   How did the symptoms start insidious onset of symptoms over time   Last 24H: avg pain/symptom intensity  4/10   Past wk: avg pain/symptom intensity 4/10   Frequency of Symptoms Constantly (% of the time)   Symptom impact on ADLs Moderately   Condition change since eval N/A (first visit)   General health reported by patient Good   Physical Therapy Goals   PT Frequency 2x/day   PT Predicted Duration/Target Date for Goal Attainment 01/29/25   PT Goals Bed Mobility;Transfers;Gait;Stairs   PT: Bed Mobility Independent;Supine to/from sit   PT: Transfers Modified independent;Sit to/from stand;Assistive device   PT: Gait Modified independent;Rolling walker;100 feet   PT: Stairs Greater than 10 stairs;Rail on left  (13 steps to bedroom)   Interventions   Interventions Quick Adds Gait Training;Therapeutic Activity;Therapeutic Procedure   Therapeutic Procedure/Exercise   Ther. Procedure: strength, endurance, ROM, flexibillity Minutes (68249) 5   Symptoms Noted During/After Treatment increased pain   Treatment Detail/Skilled Intervention Educated on post surgical benefits of TE on circulation,  "functional ROM, and strength. Left pt with TE handout. With pt in supine cued for AP's x 20, on LLE: quad sets x 10, heel slides x 10, SLR x 2. Pt tolerated all TE well   Therapeutic Activity   Therapeutic Activities: dynamic activities to improve functional performance Minutes (85599) 15   Symptoms Noted During/After Treatment Increased pain   Treatment Detail/Skilled Intervention Greeted pt supine in bed with daughter present. Eval completed. Educated on orders for WBAT, ROM to flexion tolerance, and importance of keeping knee straight when resting and demonstrated how to do so. Pt a little anxious about history of vasovagal syncope, therapist listened to concerns, provided reassurance, and allowed pt to progress at her own speed. With HOB elevated supine<>sit SBA, min cueing provided for sequencing. Pt able to reposition in bed IND. Performed Sit<>stand x 3 with FWW, cues for safe walker and hand placement and pt able to demo back well. After first STS pt stood ~30 seconds and felt her \"lips tingling\" and sat down quickly, described symptom as what she feels sometimes when she is about to pass out. BP taken with pt sitting and read 158/101, pt not feeling dizzy or light headed, was able to continue with activity. Increased time for line management and room set up   Gait Training   Gait Training Minutes (15630) 6   Symptoms Noted During/After Treatment (Gait Training) fatigue;increased pain   Treatment Detail/Skilled Intervention Pt performed 2 rounds of pre-gait, on first STS with pt in walker and CGA cued for weight shifting on/off LLE as pt nervous about WB, able to clear feet with stepping in place, no buckling. Pt performed same next time standing, then amb around room ~10' using FWW and CGA, very slow gait speed, step through pattern, pt feeling like her muscles were getting tired and shaking, reassured pt normal when first getting up after surgery, when pt walked a few more feet reported feeling muscles " calm down. No LOB noted   Distance in Feet 10'   PT Discharge Planning   PT Plan review/progress HEP, progress gait with FWW, trial steps   PT Discharge Recommendation (DC Rec)   (defer to ortho)   PT Rationale for DC Rec Pt is below baseline, was able to walk short distance in room tonight with CGA. Pt lives alone and will not have help at discharge and has 13 steps to navigate. Pt would need to be IND/Mod-I with all mobility in order to discharge home safely. Anticiapte pt to continue to progress with further IP PT but pt would likely need TCU at discharge to ensure safety and independence prior to returning home.   PT Brief overview of current status CGA with FWW - Goals of therapy will be to address safe mobility and make recs for d/c to next level of care. Pt and RN will continue to follow all falls risk precautions as documented by RN staff while hospitalized   PT Total Distance Amb During Session (feet) 15   Physical Therapy Time and Intention   Timed Code Treatment Minutes 26   Total Session Time (sum of timed and untimed services) 36       [facility]                                                                                   OUTPATIENT PHYSICAL THERAPY    PLAN OF TREATMENT FOR OUTPATIENT REHABILITATION   Patient's Last Name, First Name, Nila Lizama YOB: 1955   Provider's Name   [facility]   Medical Record No.  [id number]     Onset Date: 01/28/25 Start of Care Date: 01/28/25     Medical Diagnosis:  L TKA               PT Diagnosis:  impaired gait Certification Dates:  From: 01/28/25  To: 02/04/25       See note for plan of treatment, functional goals, and certification details.    I CERTIFY THE NEED FOR THESE SERVICES FURNISHED UNDER        THIS PLAN OF TREATMENT AND WHILE UNDER MY CARE (Physician co-signature of this document indicates review and certification of the therapy plan).

## 2025-01-29 NOTE — PLAN OF CARE
Goal Outcome Evaluation:             Patient vital signs are at baseline: Yes  Patient able to ambulate as they were prior to admission or with assist devices provided by therapies during their stay:  Yes  Patient MUST void prior to discharge:  Yes  Patient able to tolerate oral intake:  Yes  Pain has adequate pain control using Oral analgesics:  NA- denied pain this shift  Does patient have an identified : DAVINA, discharging to TCU per patient report  Has goal D/C date and time been discussed with patient:  NA- SW consult in place.    Transferred from PACU @ 1640, very pleasant. Ambulated to bathroom with one assist/gb/walker. Dressing- CDI.

## 2025-01-29 NOTE — UTILIZATION REVIEW
Concurrent stay review; Secondary Review Determination - [facility]        Under the authority of the Utilization Management Committee, the utilization review process indicated a secondary review on the above patient.  The review outcome is based on review of the medical records, discussions with staff, and applying clinical experience noted on the date of the review.        (x) Outpatient detention status is appropriate       RATIONALE FOR DETERMINATION:       Patient is a 69-year-old female with past medical history of vasovagal attacks and subsequent syncopal episodes, hypertension, hyperlipidemia, osteoarthritis who was admitted on 1/28 for a left total knee arthroplasty.  Postoperatively, the patient was seen by physical therapy who is recommending TCU.  At this point the patient is awaiting placement and is therefore [facility] care appropriate.  Patient delayed discharge is related to disposition, there is no medical necessity for inpatient admission at the time of this review. If there is a change in patient status, please resend for review.    The information on this document is developed by the utilization review team in order for the business office to ensure compliance.  This only denotes the appropriateness of proper admission status and does not reflect the quality of care rendered.       The definitions of Inpatient Status and Observation Status used in making the determination above are those provided in the CMS Coverage Manual, Chapter 1 and Chapter 6, section 70.4.       Sincerely,    Kenn Walters, DO    yes

## 2025-01-29 NOTE — PROGRESS NOTES
Orthopedic Surgery  Nila Viveros  01/29/2025     Admit Date:  1/28/2025    POD: 1 Day Post-Op   Procedure(s):  Left Total Knee Arthroplasty    Patient resting comfortably in bed.    Pain controlled.  Tolerating oral intake.    Voiding adequately  Ambulating with therapy  Denies nausea or vomiting.  Denies chest pain or shortness of breath.  No acute events overnight.    Temp:  [97.2  F (36.2  C)-98.4  F (36.9  C)] 98.2  F (36.8  C)  Pulse:  [48-89] 74  Resp:  [10-23] 17  BP: ()/() 129/84  SpO2:  [93 %-100 %] 95 %    Alert and oriented.   Left dressing is clean, dry, and intact.   Bilateral calves are soft, non-tender.  Left lower extremity is NVI.  Patient able to resist ankle dorsiflexion and plantar flexion bilaterally.  DP pulse palpable.  Sensation intact bilateral lower extremities.      A/P    DVT prophylaxis: Aspirin  Activity:  WBAT   Pain: Dilaudid/tylenol  Dressings: daily island dressing. Change prior to discharge.    Lives alone with multiple stairs in home and has condition that causes syncopal episodes. Therapy recommending TCU. She went to TCU after last TKA as well. SW consult placed to arrange for referrals to TCU.    OK for discharge pending placement    Follow-up: 2 weeks post-op with Ava Buitrago PA-C/Darshan Buitrago PA-C  [facility]

## 2025-01-29 NOTE — PLAN OF CARE
Patient vital signs are at baseline: Yes  Patient able to ambulate as they were prior to admission or with assist devices provided by therapies during their stay:  Yes  Patient MUST void prior to discharge:  Yes  Patient able to tolerate oral intake:  Yes  Pain has adequate pain control using Oral analgesics:  Yes  Does patient have an identified :  Yes  Has goal D/C date and time been discussed with patient:  Yes       L total knee arthroplasty, A/O x4, VSS, POD#0, Ax1 w/ GB + walker. Regular diet, scheduled tylenol for pain, voiding in bathroom, room air, L PIV. Ambulated to the bathroom, L leg with ACE wrap, dressing CDI, CMS intact.

## 2025-01-30 ENCOUNTER — APPOINTMENT (OUTPATIENT)
Dept: PHYSICAL THERAPY | Facility: CLINIC | Age: 70
End: 2025-01-30
Attending: ORTHOPAEDIC SURGERY
Payer: COMMERCIAL

## 2025-01-30 VITALS
TEMPERATURE: 98.9 F | SYSTOLIC BLOOD PRESSURE: 136 MMHG | OXYGEN SATURATION: 98 % | BODY MASS INDEX: 27.83 KG/M2 | DIASTOLIC BLOOD PRESSURE: 88 MMHG | HEART RATE: 78 BPM | RESPIRATION RATE: 18 BRPM | WEIGHT: 163 LBS | HEIGHT: 64 IN

## 2025-01-30 LAB
FASTING STATUS PATIENT QL REPORTED: NO
GLUCOSE SERPL-MCNC: 112 MG/DL (ref 70–99)
HGB BLD-MCNC: 10.3 G/DL (ref 11.7–15.7)

## 2025-01-30 PROCEDURE — 82947 ASSAY GLUCOSE BLOOD QUANT: CPT | Performed by: ORTHOPAEDIC SURGERY

## 2025-01-30 PROCEDURE — 85018 HEMOGLOBIN: CPT | Performed by: ORTHOPAEDIC SURGERY

## 2025-01-30 PROCEDURE — 250N000011 HC RX IP 250 OP 636: Performed by: ORTHOPAEDIC SURGERY

## 2025-01-30 PROCEDURE — 250N000013 HC RX MED GY IP 250 OP 250 PS 637: Performed by: PHYSICIAN ASSISTANT

## 2025-01-30 PROCEDURE — 97116 GAIT TRAINING THERAPY: CPT | Mod: GP | Performed by: PHYSICAL THERAPY ASSISTANT

## 2025-01-30 PROCEDURE — 97110 THERAPEUTIC EXERCISES: CPT | Mod: GP | Performed by: PHYSICAL THERAPY ASSISTANT

## 2025-01-30 PROCEDURE — 999N000111 HC STATISTIC OT IP EVAL DEFER

## 2025-01-30 PROCEDURE — 36415 COLL VENOUS BLD VENIPUNCTURE: CPT | Performed by: ORTHOPAEDIC SURGERY

## 2025-01-30 PROCEDURE — 250N000013 HC RX MED GY IP 250 OP 250 PS 637: Performed by: ORTHOPAEDIC SURGERY

## 2025-01-30 RX ADMIN — ONDANSETRON 4 MG: 4 TABLET, ORALLY DISINTEGRATING ORAL at 09:16

## 2025-01-30 RX ADMIN — HYDROMORPHONE HYDROCHLORIDE 2 MG: 2 TABLET ORAL at 12:01

## 2025-01-30 RX ADMIN — CALCITRIOL CAPSULES 0.25 MCG 0.25 MCG: 0.25 CAPSULE ORAL at 08:09

## 2025-01-30 RX ADMIN — ACETAMINOPHEN 650 MG: 325 TABLET, FILM COATED ORAL at 09:16

## 2025-01-30 RX ADMIN — HYDROXYZINE HYDROCHLORIDE 10 MG: 10 TABLET ORAL at 09:16

## 2025-01-30 RX ADMIN — LISINOPRIL 20 MG: 20 TABLET ORAL at 08:09

## 2025-01-30 RX ADMIN — HYDROMORPHONE HYDROCHLORIDE 4 MG: 2 TABLET ORAL at 06:08

## 2025-01-30 RX ADMIN — ASPIRIN 81 MG: 81 TABLET, COATED ORAL at 08:09

## 2025-01-30 RX ADMIN — SENNOSIDES AND DOCUSATE SODIUM 1 TABLET: 50; 8.6 TABLET ORAL at 08:09

## 2025-01-30 RX ADMIN — Medication 1200 MG: at 08:08

## 2025-01-30 ASSESSMENT — ACTIVITIES OF DAILY LIVING (ADL)
ADLS_ACUITY_SCORE: 40

## 2025-01-30 NOTE — PLAN OF CARE
OT: Order received, chart reviewed and discussed with care team. Per PT note, patient had R TKA in October and went to TCU for continued rehab. Patient plans to discharge to TCU following this admission. Defer discharge recommendations to PT and care team. Will complete OT orders and defer to next level of care.

## 2025-01-30 NOTE — PROGRESS NOTES
Care Management Discharge Note    Discharge Date: 01/30/2025       Discharge Disposition:  (TCU)    Discharge Services:      Discharge DME:      Discharge Transportation: family or friend will provide    Private pay costs discussed: private room/amenity fees and transportation costs    Does the patient's insurance plan have a 3 day qualifying hospital stay waiver?  Yes     Which insurance plan 3 day waiver is available? Alternative insurance waiver    Will the waiver be used for post-acute placement? Yes    PAS Confirmation Code: [id number]  Patient/family educated on Medicare website which has current facility and service quality ratings:      Education Provided on the Discharge Plan:    Persons Notified of Discharge Plans: Rabia  Patient/Family in Agreement with the Plan:      Handoff Referral Completed: No, handoff not indicated or clinically appropriate    Additional Information:  Patient accepted to [facility] today into a private room with no added fee. Writer confirmed that if patient needs medical transport set up, they are aware of out of pocket cost. Patient and/or family would to transport with her daughter. She will arrive around 1230 with anticipated time to leave the hospital around [phone].     Discharge orders received for discharge today and faxed to [facility]. Writer confirmed ride time with patient and bedside nurse.    PAS completed.     PAS-RR    D: Per DHS regulation, SW completed and submitted PAS-RR to [place] [employer] Direct Connect via the Senior LinkAge Line.  PAS-RR confirmation # is : [id number]    I: SW spoke with patient and they are aware a PAS-RR has been submitted.  ALAN reviewed with patient that they may be contacted for a follow up appointment within 10 days of hospital discharge if their SNF stay is < 30 days.  Contact information for [facility] LinkAge Line was also provided.    A: patient verbalized understanding.    P: Further questions may be directed to Senior  LinkAge Line at #[phone], option #4 for [facility]- staff.      THUY Camacho

## 2025-01-30 NOTE — PROGRESS NOTES
Patient vital signs are at baseline: Yes  Patient able to ambulate as they were prior to admission or with assist devices provided by therapies during their stay:  Yes  Patient MUST void prior to discharge:  Yes  Patient able to tolerate oral intake:  Yes  Pain has adequate pain control using Oral analgesics:  Yes  Does patient have an identified :  Yes  Has goal D/C date and time been discussed with patient:  Yes    Diagnosis: Left total knee arthroplasty  POD#: 2  Mental Status: A&O x4  Activity/dangle: Ast of 1 GB/W  Diet: Regular diet  Pain: Managed with PRN Tylenol, Atarax and PO Dilaudid  Lau/Voiding: Voiding adequately in the bathroom  Tele/Restraints/Iso: N/A  02/LDA: Room air. PIV removed  Other Info: Left knee dressing (island) changed per order. Gave copy of AVS to pt. All belongings taken by pt and family. Discharge to TCU with ride from daughter.

## 2025-01-30 NOTE — PLAN OF CARE
Goal Outcome Evaluation:    Patient vital signs are at baseline: Yes  Patient able to ambulate as they were prior to admission or with assist devices provided by therapies during their stay:  Yes  Patient MUST void prior to discharge:  Yes  Patient able to tolerate oral intake:  Yes  Pain has adequate pain control using Oral analgesics:  Yes  Does patient have an identified :  Yes  Has goal D/C date and time been discussed with patient:  Yes      A&Ox4, up with 1 w/ GB&walker, voiding in bathroom, pain controlled with po dilaudid, no complaints of dizziness, lightheadedness, or nausea this shift

## 2025-01-30 NOTE — CONSULTS
Consult completed. Patient discharging today to TCU.    THUY Camacho  Inpatient   [facility]

## 2025-01-30 NOTE — PROGRESS NOTES
Orthopedic Surgery  Nila Viveros  01/30/2025     Admit Date:  1/28/2025    POD: 2 Days Post-Op   Procedure(s):  Left Total Knee Arthroplasty    Patient sitting up comfortably in bed; working with therapy  Pain controlled.  Tolerating oral intake.    Voiding adequately  Ambulating with therapy  Denies nausea or vomiting.  Denies chest pain or shortness of breath.  No acute events overnight.    Temp:  [98.1  F (36.7  C)-98.9  F (37.2  C)] 98.9  F (37.2  C)  Pulse:  [62-81] 78  Resp:  [16-18] 18  BP: ()/(56-88) 136/88  SpO2:  [95 %-98 %] 98 %    Alert and oriented.   Left dressing is clean, dry, and intact.   Bilateral calves are soft, non-tender.  Left lower extremity is NVI.  Patient able to resist ankle dorsiflexion and plantar flexion bilaterally.  DP pulse palpable.  Sensation intact bilateral lower extremities.      A/P    DVT prophylaxis: Aspirin  Activity:  WBAT   Pain: Dilaudid/tylenol  Dressings: daily island dressing. Change prior to discharge.    SW consulted and referrals sent to TCUs; awaiting placement. OK for discharge when placement found.     Follow-up: 2 weeks post-op with Ava Buitrago PA-C/Darshan Buitrago PA-C  [facility]

## 2025-01-31 NOTE — PLAN OF CARE
Physical Therapy Discharge Summary    Reason for therapy discharge:    Discharged to transitional care facility.    Progress towards therapy goal(s). See goals on Care Plan in [facility] electronic health record for goal details.  Goals not met.  Barriers to achieving goals:   limited tolerance for therapy and discharge from facility.    Therapy recommendation(s):    Continued therapy is recommended.  Rationale/Recommendations:  Per treating therapist:  Patient is below baseline with decreased tolerance to all functional mobility secondary to dizziness, nausea, and decreased strength after TKA. Continues to benefit from consistent supervision/CGA for mobility due to risk of syncope with ambulation. Patient would benefit from skilled PT services at discharge and may benefit from TCU services in order to progress safely after acute hospital stay..

## 2025-02-05 ASSESSMENT — ACTIVITIES OF DAILY LIVING (ADL)
KNEEL ON THE FRONT OF YOUR KNEE: I AM UNABLE TO DO THE ACTIVITY
WALK: ACTIVITY IS FAIRLY DIFFICULT
SWELLING: THE SYMPTOM AFFECTS MY ACTIVITY SEVERELY
STIFFNESS: THE SYMPTOM AFFECTS MY ACTIVITY SEVERELY
GO DOWN STAIRS: ACTIVITY IS VERY DIFFICULT
HOW_WOULD_YOU_RATE_THE_OVERALL_FUNCTION_OF_YOUR_KNEE_DURING_YOUR_USUAL_DAILY_ACTIVITIES?: ABNORMAL
SIT WITH YOUR KNEE BENT: ACTIVITY IS FAIRLY DIFFICULT
KNEEL ON THE FRONT OF YOUR KNEE: I AM UNABLE TO DO THE ACTIVITY
STAND: ACTIVITY IS MINIMALLY DIFFICULT
KNEE_ACTIVITY_OF_DAILY_LIVING_SUM: 23
SQUAT: I AM UNABLE TO DO THE ACTIVITY
SQUAT: I AM UNABLE TO DO THE ACTIVITY
HOW_WOULD_YOU_RATE_THE_OVERALL_FUNCTION_OF_YOUR_KNEE_DURING_YOUR_USUAL_DAILY_ACTIVITIES?: ABNORMAL
GIVING WAY, BUCKLING OR SHIFTING OF KNEE: I DO NOT HAVE THE SYMPTOM
STIFFNESS: THE SYMPTOM AFFECTS MY ACTIVITY SEVERELY
STAND: ACTIVITY IS MINIMALLY DIFFICULT
GO UP STAIRS: ACTIVITY IS VERY DIFFICULT
GO DOWN STAIRS: ACTIVITY IS VERY DIFFICULT
WEAKNESS: THE SYMPTOM AFFECTS MY ACTIVITY SEVERELY
RAW_SCORE: 23
WEAKNESS: THE SYMPTOM AFFECTS MY ACTIVITY SEVERELY
WALK: ACTIVITY IS FAIRLY DIFFICULT
LIMPING: THE SYMPTOM AFFECTS MY ACTIVITY SEVERELY
SIT WITH YOUR KNEE BENT: ACTIVITY IS FAIRLY DIFFICULT
HOW_WOULD_YOU_RATE_THE_CURRENT_FUNCTION_OF_YOUR_KNEE_DURING_YOUR_USUAL_DAILY_ACTIVITIES_ON_A_SCALE_FROM_0_TO_100_WITH_100_BEING_YOUR_LEVEL_OF_KNEE_FUNCTION_PRIOR_TO_YOUR_INJURY_AND_0_BEING_THE_INABILITY_TO_PERFORM_ANY_OF_YOUR_USUAL_DAILY_ACTIVITIES?: 25
GO UP STAIRS: ACTIVITY IS VERY DIFFICULT
SWELLING: THE SYMPTOM AFFECTS MY ACTIVITY SEVERELY
AS_A_RESULT_OF_YOUR_KNEE_INJURY,_HOW_WOULD_YOU_RATE_YOUR_CURRENT_LEVEL_OF_DAILY_ACTIVITY?: ABNORMAL
PLEASE_INDICATE_YOR_PRIMARY_REASON_FOR_REFERRAL_TO_THERAPY:: KNEE
PAIN: THE SYMPTOM AFFECTS MY ACTIVITY SEVERELY
LIMPING: THE SYMPTOM AFFECTS MY ACTIVITY SEVERELY
HOW_WOULD_YOU_RATE_THE_CURRENT_FUNCTION_OF_YOUR_KNEE_DURING_YOUR_USUAL_DAILY_ACTIVITIES_ON_A_SCALE_FROM_0_TO_100_WITH_100_BEING_YOUR_LEVEL_OF_KNEE_FUNCTION_PRIOR_TO_YOUR_INJURY_AND_0_BEING_THE_INABILITY_TO_PERFORM_ANY_OF_YOUR_USUAL_DAILY_ACTIVITIES?: 25
AS_A_RESULT_OF_YOUR_KNEE_INJURY,_HOW_WOULD_YOU_RATE_YOUR_CURRENT_LEVEL_OF_DAILY_ACTIVITY?: ABNORMAL
GIVING WAY, BUCKLING OR SHIFTING OF KNEE: I DO NOT HAVE THE SYMPTOM
RISE FROM A CHAIR: ACTIVITY IS SOMEWHAT DIFFICULT
KNEE_ACTIVITY_OF_DAILY_LIVING_SCORE: 32.86
PAIN: THE SYMPTOM AFFECTS MY ACTIVITY SEVERELY
RISE FROM A CHAIR: ACTIVITY IS SOMEWHAT DIFFICULT

## 2025-02-06 ENCOUNTER — THERAPY VISIT (OUTPATIENT)
Dept: PHYSICAL THERAPY | Facility: CLINIC | Age: 70
End: 2025-02-06
Payer: COMMERCIAL

## 2025-02-06 DIAGNOSIS — M25.662 KNEE STIFFNESS, LEFT: ICD-10-CM

## 2025-02-06 DIAGNOSIS — R26.89 ANTALGIC GAIT: ICD-10-CM

## 2025-02-06 DIAGNOSIS — M25.562 LEFT KNEE PAIN: Primary | ICD-10-CM

## 2025-02-06 NOTE — PROGRESS NOTES
PHYSICAL THERAPY EVALUATION  Type of Visit: Evaluation        Fall Risk Screen:  Fall screen completed by: PT  Have you fallen 2 or more times in the past year?: No  Have you fallen and had an injury in the past year?: Yes (slipped on the ice - fx ankle and was non-WB for a while)  Is patient a fall risk?: Yes  Fall screen comments: Pt gets light-headed and has fainted due to this since surgery.  Required gait belt and SBA today during PT session.    Subjective      Condition type:  Initial onset (within last 3 months)  Cause of current episode:  Post Surgical  Type of surgery: 5-Joint Replacement  Nature of treatment:  Rehabilitative  Functional ability:  Severe functional limitations  Documented POC (choose all that apply):  Measurable short and long term/discharge treatment goals related to physical and functional deficits.;Frequency of treatment visits and treatment activities to address deficit areas.;Patient agrees to program participation including home program  Briefly describe symptoms:  Spasm at L knee intermittently with intermittent aching pain at L knee joint  How did the symptoms start:  Due to arthritis  Average pain/intensity last 24 hours:  5/10  Average pain/intensity past week:  5/10  Frequency of Symptoms:  Occasionally (26-50% of the time)  Symptom impact on ADLs:  Quite a bit  Condition change since eval:     General health reported by patient:  Good      Presenting condition or subjective complaint: total left knee replacement 1/28; pain, swelling and immobility  Date of onset: 01/28/25 (DOS)    Relevant medical history: Arthritis; Bladder or bowel problems; Dizziness; High blood pressure; History of fractures; Implanted device; Osteoporosis   Dates & types of surgery: 1988-tubal ligation;  right knee removal of growth;  vein stripping;  eyes;  2008- uterine ablation;  2023 right knee replacement;  2025 left knee replacement              uterine ablation    Prior diagnostic imaging/testing  results: X-ray     Prior therapy history for the same diagnosis, illness or injury: Yes Oct-Dec 2023 rehab of right knee after replacement    Prior Level of Function  Transfers: Independent  Ambulation: Independent  ADL: Independent  IADL:     Living Environment  Social support: Alone   Type of home: House; 2-story   Stairs to enter the home: Yes 8 Is there a railing: No     Ramp: No   Stairs inside the home: Yes 13 Is there a railing: Yes     Help at home: Self Cares (home health aide/personal care attendant, family, etc); Home and Yard maintenance tasks  Equipment owned: Straight Cane; Walker with wheels; Raised toilet seat; Bath bench     Employment: No    Hobbies/Interests: gardening, yoga, pilates, swimming, hiking, pickleball    Patient goals for therapy: walk w/o limp, stairs, workout    Pain assessment:      Objective   KNEE EVALUATION  PAIN: Pain Level at Rest: 5/10  Pain Level with Use: 5/10  Pain Location: L knee joint  Pain Quality: Aching and spasms - intermittent  Pain Frequency: intermittent  Pain is Worst: nighttime  Pain is Exacerbated By: walking, stairs, being in one position for too long, standing, sleeping  Pain is Relieved By: delodid at night  Pain Progression: Improved  INTEGUMENTARY (edema, incisions):  moderate swelling through L knee/thigh and bruising at medial/lateral thigh  POSTURE:   GAIT:  Weightbearing Status:   Assistive Device(s): Walker (front wheeled)  Gait Deviations:  dec L knee ext  BALANCE/PROPRIOCEPTION:   WEIGHTBEARING ALIGNMENT:   NON-WEIGHTBEARING ALIGNMENT:   ROM:   (Degrees) Left AROM Left PROM  Right AROM Right PROM   Knee Flexion 83 (+) 95 (ERP) 133    Knee Extension Lacking 20 degrees extension Lacking 15 degrees 3 degrees hyperextension    Pain:   End feel:     STRENGTH:   Pain: - none + mild ++ moderate +++ severe  Strength Scale: 0-5/5 Left Right   Knee Flexion 3+ 5   Knee Extension 3- (+++) 5   Quad Set 4- 5     FLEXIBILITY:   SPECIAL TESTS:   FUNCTIONAL TESTS:    PALPATION:   JOINT MOBILITY:     Assessment & Plan   CLINICAL IMPRESSIONS  Medical Diagnosis: s/p left total knee arthroplasty    Treatment Diagnosis: left knee pain, left knee stiffness, antalgic gait   Impression/Assessment: Patient is a 69 year old female with left knee complaints.  The following significant findings have been identified: Pain, Decreased ROM/flexibility, Decreased joint mobility, Decreased strength, Impaired balance, Decreased proprioception, Edema, Impaired gait, Impaired muscle performance, and Decreased activity tolerance. These impairments interfere with their ability to perform recreational activities, household chores, household mobility, community mobility, and stairs  as compared to previous level of function.     Clinical Decision Making (Complexity):  Clinical Presentation: Stable/Uncomplicated  Clinical Presentation Rationale: based on medical and personal factors listed in PT evaluation  Clinical Decision Making (Complexity): Low complexity    PLAN OF CARE  Treatment Interventions:  Modalities: Cryotherapy  Interventions: Gait Training, Manual Therapy, Neuromuscular Re-education, Therapeutic Activity, Therapeutic Exercise    Long Term Goals     PT Goal 1  Goal Identifier: Ambulation  Goal Description: Pt will be able to ambulate without AD with normal gait pattern for household distances.  Rationale: to maximize safety and independence within the home  Target Date: 02/27/25  PT Goal 2  Goal Identifier: Stairs  Goal Description: Pt will be able to go up/down a flight of stairs with a railing reciprocally.  Rationale: to maximize safety and independence within the community;to maximize safety and independence within the home  Target Date: 04/03/25  PT Goal 3  Goal Identifier: Ambulation  Goal Description: Pt will be able to ambulate for 30 mn with normal gait pattern without AD.  Rationale: to maximize safety and independence within the community  Target Date: 04/03/25      Frequency  of Treatment: 2x/week  Duration of Treatment: 8 weeks    Recommended Referrals to Other Professionals:   Education Assessment:   Learner/Method: No Barriers to Learning    Risks and benefits of evaluation/treatment have been explained.   Patient/Family/caregiver agrees with Plan of Care.     Evaluation Time:     PT Eval, Low Complexity Minutes (07572): 15       Signing Clinician: ANABELLE Hunt [facility]                                                                                   OUTPATIENT PHYSICAL THERAPY      PLAN OF TREATMENT FOR OUTPATIENT REHABILITATION   Patient's Last Name, First Name, DESIREEAWA DumonttashaNila  DESIREE YOB: 1955   Provider's Name   [facility]   Medical Record No.  [id number]     Onset Date: 01/28/25 (DOS)  Start of Care Date: 02/06/25     Medical Diagnosis:  s/p left total knee arthroplasty      PT Treatment Diagnosis:  left knee pain, left knee stiffness, antalgic gait Plan of Treatment  Frequency/Duration: 2x/week/ 8 weeks    Certification date from 02/06/25 to 04/03/25         See note for plan of treatment details and functional goals     Zara Hinton PT                         I CERTIFY THE NEED FOR THESE SERVICES FURNISHED UNDER        THIS PLAN OF TREATMENT AND WHILE UNDER MY CARE     (Physician attestation of this document indicates review and certification of the therapy plan).              Referring Provider:  Darshan Caban    Initial Assessment  See Epic Evaluation- Start of Care Date: 02/06/25

## 2025-02-10 ENCOUNTER — THERAPY VISIT (OUTPATIENT)
Dept: PHYSICAL THERAPY | Facility: CLINIC | Age: 70
End: 2025-02-10
Payer: COMMERCIAL

## 2025-02-10 DIAGNOSIS — R26.89 ANTALGIC GAIT: ICD-10-CM

## 2025-02-10 DIAGNOSIS — M25.662 KNEE STIFFNESS, LEFT: ICD-10-CM

## 2025-02-10 DIAGNOSIS — Z96.652 S/P TOTAL KNEE ARTHROPLASTY, LEFT: Primary | ICD-10-CM

## 2025-02-10 DIAGNOSIS — M25.562 ACUTE PAIN OF LEFT KNEE: ICD-10-CM

## 2025-02-10 PROCEDURE — 97110 THERAPEUTIC EXERCISES: CPT | Mod: GP | Performed by: PHYSICAL THERAPIST

## 2025-02-10 PROCEDURE — 97010 HOT OR COLD PACKS THERAPY: CPT | Mod: GP | Performed by: PHYSICAL THERAPIST

## 2025-02-13 ENCOUNTER — THERAPY VISIT (OUTPATIENT)
Dept: PHYSICAL THERAPY | Facility: CLINIC | Age: 70
End: 2025-02-13
Payer: COMMERCIAL

## 2025-02-13 DIAGNOSIS — M25.662 KNEE STIFFNESS, LEFT: ICD-10-CM

## 2025-02-13 DIAGNOSIS — Z96.652 S/P TOTAL KNEE ARTHROPLASTY, LEFT: Primary | ICD-10-CM

## 2025-02-13 DIAGNOSIS — R26.89 ANTALGIC GAIT: ICD-10-CM

## 2025-02-13 DIAGNOSIS — M25.562 ACUTE PAIN OF LEFT KNEE: ICD-10-CM

## 2025-02-18 ENCOUNTER — THERAPY VISIT (OUTPATIENT)
Dept: PHYSICAL THERAPY | Facility: CLINIC | Age: 70
End: 2025-02-18
Payer: COMMERCIAL

## 2025-02-18 DIAGNOSIS — R26.89 ANTALGIC GAIT: ICD-10-CM

## 2025-02-18 DIAGNOSIS — M25.562 ACUTE PAIN OF LEFT KNEE: ICD-10-CM

## 2025-02-18 DIAGNOSIS — M25.662 KNEE STIFFNESS, LEFT: ICD-10-CM

## 2025-02-18 DIAGNOSIS — Z96.652 S/P TOTAL KNEE ARTHROPLASTY, LEFT: Primary | ICD-10-CM

## 2025-02-18 PROCEDURE — 97110 THERAPEUTIC EXERCISES: CPT | Mod: GP | Performed by: PHYSICAL THERAPIST

## 2025-02-27 ENCOUNTER — THERAPY VISIT (OUTPATIENT)
Dept: PHYSICAL THERAPY | Facility: CLINIC | Age: 70
End: 2025-02-27
Payer: COMMERCIAL

## 2025-02-27 DIAGNOSIS — M25.662 KNEE STIFFNESS, LEFT: ICD-10-CM

## 2025-02-27 DIAGNOSIS — Z96.652 S/P TOTAL KNEE ARTHROPLASTY, LEFT: Primary | ICD-10-CM

## 2025-02-27 DIAGNOSIS — R26.89 ANTALGIC GAIT: ICD-10-CM

## 2025-02-27 DIAGNOSIS — M25.562 ACUTE PAIN OF LEFT KNEE: ICD-10-CM

## 2025-03-13 ENCOUNTER — THERAPY VISIT (OUTPATIENT)
Dept: PHYSICAL THERAPY | Facility: CLINIC | Age: 70
End: 2025-03-13
Payer: COMMERCIAL

## 2025-03-13 DIAGNOSIS — M25.562 ACUTE PAIN OF LEFT KNEE: ICD-10-CM

## 2025-03-13 DIAGNOSIS — M25.662 KNEE STIFFNESS, LEFT: ICD-10-CM

## 2025-03-13 DIAGNOSIS — Z96.652 S/P TOTAL KNEE ARTHROPLASTY, LEFT: Primary | ICD-10-CM

## 2025-03-13 DIAGNOSIS — R26.89 ANTALGIC GAIT: ICD-10-CM

## 2025-03-19 ENCOUNTER — THERAPY VISIT (OUTPATIENT)
Age: 70
End: 2025-03-19
Payer: COMMERCIAL

## 2025-03-19 DIAGNOSIS — Z96.652 S/P TOTAL KNEE ARTHROPLASTY, LEFT: Primary | ICD-10-CM

## 2025-03-19 DIAGNOSIS — M25.662 KNEE STIFFNESS, LEFT: ICD-10-CM

## 2025-03-19 DIAGNOSIS — M25.562 ACUTE PAIN OF LEFT KNEE: ICD-10-CM

## 2025-03-19 DIAGNOSIS — R26.89 ANTALGIC GAIT: ICD-10-CM

## 2025-03-19 PROCEDURE — 97110 THERAPEUTIC EXERCISES: CPT | Mod: GP | Performed by: PHYSICAL THERAPIST

## 2025-03-19 PROCEDURE — 97140 MANUAL THERAPY 1/> REGIONS: CPT | Mod: GP | Performed by: PHYSICAL THERAPIST

## 2025-03-19 PROCEDURE — 97530 THERAPEUTIC ACTIVITIES: CPT | Mod: GP | Performed by: PHYSICAL THERAPIST

## 2025-03-27 ENCOUNTER — THERAPY VISIT (OUTPATIENT)
Age: 70
End: 2025-03-27
Payer: COMMERCIAL

## 2025-03-27 DIAGNOSIS — M25.562 ACUTE PAIN OF LEFT KNEE: ICD-10-CM

## 2025-03-27 DIAGNOSIS — M25.662 KNEE STIFFNESS, LEFT: ICD-10-CM

## 2025-03-27 DIAGNOSIS — R26.89 ANTALGIC GAIT: ICD-10-CM

## 2025-03-27 DIAGNOSIS — Z96.652 S/P TOTAL KNEE ARTHROPLASTY, LEFT: Primary | ICD-10-CM

## 2025-04-03 ENCOUNTER — THERAPY VISIT (OUTPATIENT)
Age: 70
End: 2025-04-03
Payer: COMMERCIAL

## 2025-04-03 DIAGNOSIS — M25.562 ACUTE PAIN OF LEFT KNEE: ICD-10-CM

## 2025-04-03 DIAGNOSIS — R26.89 ANTALGIC GAIT: ICD-10-CM

## 2025-04-03 DIAGNOSIS — M25.662 KNEE STIFFNESS, LEFT: ICD-10-CM

## 2025-04-03 DIAGNOSIS — Z96.652 S/P TOTAL KNEE ARTHROPLASTY, LEFT: Primary | ICD-10-CM

## 2025-04-03 NOTE — PROGRESS NOTES
[facility]                                                                                   OUTPATIENT PHYSICAL THERAPY    PLAN OF TREATMENT FOR OUTPATIENT REHABILITATION   Patient's Last Name, First Name, Nila Lizama YOB: 1955   Provider's Name   [facility]   Medical Record No.  [id number]     Onset Date: 01/28/25 (DOS)  Start of Care Date: 02/06/25     Medical Diagnosis:  s/p left total knee arthroplasty      PT Treatment Diagnosis:  left knee pain, left knee stiffness, antalgic gait Plan of Treatment  Frequency/Duration: 1x/week/ 6 weeks    Certification date from 04/04/25 to 05/15/25         See note for plan of treatment details and functional goals     Zara Hinton, PT                         I CERTIFY THE NEED FOR THESE SERVICES FURNISHED UNDER        THIS PLAN OF TREATMENT AND WHILE UNDER MY CARE     (Physician attestation of this document indicates review and certification of the therapy plan).              Referring Provider:  Darshan Caban    Initial Assessment  See Epic Evaluation- Start of Care Date: 02/06/25

## 2025-04-10 ENCOUNTER — THERAPY VISIT (OUTPATIENT)
Age: 70
End: 2025-04-10
Payer: COMMERCIAL

## 2025-04-10 DIAGNOSIS — M25.562 ACUTE PAIN OF LEFT KNEE: ICD-10-CM

## 2025-04-10 DIAGNOSIS — M25.662 KNEE STIFFNESS, LEFT: ICD-10-CM

## 2025-04-10 DIAGNOSIS — R26.89 ANTALGIC GAIT: ICD-10-CM

## 2025-04-10 DIAGNOSIS — Z96.652 S/P TOTAL KNEE ARTHROPLASTY, LEFT: Primary | ICD-10-CM

## 2025-04-17 ENCOUNTER — THERAPY VISIT (OUTPATIENT)
Age: 70
End: 2025-04-17
Payer: COMMERCIAL

## 2025-04-17 DIAGNOSIS — Z96.652 S/P TOTAL KNEE ARTHROPLASTY, LEFT: Primary | ICD-10-CM

## 2025-04-17 DIAGNOSIS — R26.89 ANTALGIC GAIT: ICD-10-CM

## 2025-04-17 DIAGNOSIS — M25.562 ACUTE PAIN OF LEFT KNEE: ICD-10-CM

## 2025-04-17 DIAGNOSIS — M25.662 KNEE STIFFNESS, LEFT: ICD-10-CM

## 2025-05-01 ENCOUNTER — THERAPY VISIT (OUTPATIENT)
Age: 70
End: 2025-05-01
Payer: COMMERCIAL

## 2025-05-01 DIAGNOSIS — Z96.652 S/P TOTAL KNEE ARTHROPLASTY, LEFT: Primary | ICD-10-CM

## 2025-05-01 DIAGNOSIS — M25.662 KNEE STIFFNESS, LEFT: ICD-10-CM

## 2025-05-01 DIAGNOSIS — M25.562 ACUTE PAIN OF LEFT KNEE: ICD-10-CM

## 2025-05-01 DIAGNOSIS — R26.89 ANTALGIC GAIT: ICD-10-CM

## 2025-05-01 ASSESSMENT — ACTIVITIES OF DAILY LIVING (ADL)
STAND: ACTIVITY IS NOT DIFFICULT
SQUAT: ACTIVITY IS FAIRLY DIFFICULT
GIVING WAY, BUCKLING OR SHIFTING OF KNEE: THE SYMPTOM AFFECTS MY ACTIVITY SLIGHTLY
KNEEL ON THE FRONT OF YOUR KNEE: I AM UNABLE TO DO THE ACTIVITY
STIFFNESS: THE SYMPTOM AFFECTS MY ACTIVITY SLIGHTLY
LIMPING: THE SYMPTOM AFFECTS MY ACTIVITY SLIGHTLY
AS_A_RESULT_OF_YOUR_KNEE_INJURY,_HOW_WOULD_YOU_RATE_YOUR_CURRENT_LEVEL_OF_DAILY_ACTIVITY?: NEARLY NORMAL
RAW_SCORE: 46
RISE FROM A CHAIR: ACTIVITY IS MINIMALLY DIFFICULT
KNEE_ACTIVITY_OF_DAILY_LIVING_SUM: 46
GO DOWN STAIRS: ACTIVITY IS MINIMALLY DIFFICULT
GO UP STAIRS: ACTIVITY IS SOMEWHAT DIFFICULT
WALK: ACTIVITY IS NOT DIFFICULT
PAIN: I HAVE THE SYMPTOM BUT IT DOES NOT AFFECT MY ACTIVITY
KNEE_ACTIVITY_OF_DAILY_LIVING_SCORE: 65.71
SIT WITH YOUR KNEE BENT: ACTIVITY IS NOT DIFFICULT
WEAKNESS: THE SYMPTOM AFFECTS MY ACTIVITY MODERATELY
HOW_WOULD_YOU_RATE_THE_OVERALL_FUNCTION_OF_YOUR_KNEE_DURING_YOUR_USUAL_DAILY_ACTIVITIES?: NEARLY NORMAL
SWELLING: THE SYMPTOM AFFECTS MY ACTIVITY SLIGHTLY

## 2025-05-01 NOTE — PROGRESS NOTES
PLAN  Pt is doing well and will try to work on HEP independently to progress on functional strength and knee ext ROM.  She will return only if sx exacerbate over the next month.     05/01/25 0500   Appointment Info   Signing clinician's name / credentials Zara Hinton DPT, Cert. MDT   Total/Authorized Visits 16 (recommended)   Visits Used 13   Medical Diagnosis s/p left total knee arthroplasty   PT Tx Diagnosis left knee pain, left knee stiffness, antalgic gait   Progress Note/Certification   Start of Care Date 02/06/25   Onset of illness/injury or Date of Surgery 01/28/25  (DOS)   Therapy Frequency 1x/week   Predicted Duration 6 weeks   Certification date from 04/04/25   Certification date to 05/15/25   Progress Note Due Date 04/03/25   Progress Note Completed Date 05/01/25   [facility] Authorization Information   Condition type Initial onset (within last 3 months)   Cause of current episode Post Surgical   Type of surgery 5-Joint Replacement   Nature of treatment Rehabilitative   Documented POC (choose all that apply) Measurable short and long term/discharge treatment goals related to physical and functional deficits.;Frequency of treatment visits and treatment activities to address deficit areas.;Patient agrees to program participation including home program   How did the symptoms start Due to arthritis   General health reported by patient Good   GOALS   PT Goals 2;3   PT Goal 1   Goal Identifier Ambulation   Goal Description Pt will be able to ambulate without AD with normal gait pattern for household distances.   Rationale to maximize safety and independence within the home   Target Date 04/24/25   Date Met 05/01/25   PT Goal 2   Goal Identifier Stairs   Goal Description Pt will be able to go up/down a flight of stairs with a railing reciprocally.   Rationale to maximize safety and independence within the community;to maximize safety and independence within the home   Target Date 04/03/25   Date Met 03/19/25  "  PT Goal 3   Goal Identifier Ambulation   Goal Description Pt will be able to ambulate for 30 mn with normal gait pattern without AD.   Rationale to maximize safety and independence within the community   Goal Progress progressing well - nearly at goal   Target Date 05/15/25   Subjective Report   Subjective Report Pt reports that she hasn't done the band exercise much due to being with her grandkids  She also noticed more pain below her knee on the outer side that kept her awake one night.   Objective Measures   Objective Measures Objective Measure 1;Objective Measure 2;Objective Measure 3;Objective Measure 4;Objective Measure 5   Objective Measure 1   Objective Measure L knee PROM   Details 1-2 degrees lacking extension in supine w/PTOP - significant improvement with adding rolling on L lateral hamstring/ITB   Objective Measure 2   Objective Measure gait   Details slightly dec knee ext - nearly normal   Objective Measure 3   Objective Measure L knee AROM   Details 0-5-126   Objective Measure 4   Objective Measure PF mobility   Details sup/inf/med/lat WNL on L knee - improved   Objective Measure 5   Objective Measure R VMO strength   Details fair   PT Modalities   PT Modalities Cryotherapy   Treatment Interventions (PT)   Interventions Therapeutic Procedure/Exercise;Therapeutic Activity;Manual Therapy   Therapeutic Procedure/Exercise   Therapeutic Procedures: strength, endurance, ROM, flexibility minutes (14161) 15   Therapeutic Procedures Ther Proc 2;Ther Proc 3;Ther Proc 4;Ther Proc 5;Ther Proc 6;Ther Proc 7;Ther Proc 8;Ther Proc 9;Ther Proc 10   Ther Proc 1 standing gastroc stretch   Ther Proc 1 - Details HEP   Ther Proc 2 functional knee extension   Ther Proc 2 - Details rev w/GTB x 15 reps - VC for going back as far as able and hold for 5\" - HEP once/day   Ther Proc 3 quad set   Ther Proc 3 - Details rev x15 reps- HEP   Ther Proc 4 manual knee ext stretch   Ther Proc 4 - Details rev long-sitting x3', " "2x--intermittent breaks as needed- - 1-2 degrees ext at end of stretching   Ther Proc 5 NuStep   Ther Proc 5 - Details seat 7, arms 8, 5' - feels really good to do   Ther Proc 6 seated w/self OP extension   Ther Proc 6 - Details rev - pt is primarily doing this with leg in front of her on floor - recommended propping heel up to get more stretch to get last couple degrees of extension - HEP   Ther Proc 7 leg press seat # 4   Ther Proc 7 - Details didn't do today - 3 plates, DL x 30 reps - focus on full extension   Ther Proc 8 seated hamstring stretch   Ther Proc 8 - Details rev -  w/self OP at knee to get knee straight x 6 reps, 10\" hold - HEP   Ther Proc 9 heelslide   Ther Proc 9 - Details HEP   Ther Proc 10 bike   Ther Proc 10 - Details didn't do - seat 5, level 1, x 5 min--easily goes around now   Skilled Intervention VC, VSC for form again   Patient Response/Progress didn't tolerate too much extension stretching today due to increased swelling/tightness today   Therapeutic Activity   Therapeutic Activities Ther Act 2   Ther Act 1 knee bends   Ther Act 1 - Details didn't do today x10 reps, VC for form - HEP   Ther Act 2 backward step   Ther Act 2 - Details didn't do today - 6\" x 10 reps w/L LE - fatigue - HEP   Manual Therapy   Manual Therapy: Mobilization, MFR, MLD, friction massage minutes (27648) 20   Manual Therapy Manual Therapy 2;Manual Therapy 4   Manual Therapy 1 STM   Manual Therapy 1 - Details x8' - at distal hamstring/ITB - felt better after - discussed icing this daily - likely either a tendon that's inflamed or a bursa - either way icing will help.   Manual Therapy 2 post mobs at knee to increase extension   Manual Therapy 2 - Details x5', grade III-IV - felt good to her   Manual Therapy 4 foam roller   Manual Therapy 4 - Details at L lateral hamstring and ITB - felt much better stretching into extension afterward x 5'   Skilled Intervention manual techniquex   Patient Response/Progress see above "   Education   Learner/Method No Barriers to Learning;Listening;Patient   Plan   Home program updated PTRX   Plan for next session d/c if no return in 1 month   Total Session Time   Timed Code Treatment Minutes 35   Total Treatment Time (sum of timed and untimed services) 35       Beginning/End Dates of Progress Note Reporting Period:  05/01/25 to 05/01/2025    Referring Provider:  Darshan Caban

## 2025-05-15 PROBLEM — S82.201A TIBIA/FIBULA FRACTURE, RIGHT, CLOSED, INITIAL ENCOUNTER: Status: RESOLVED | Noted: 2024-03-08 | Resolved: 2025-05-15

## 2025-05-15 PROBLEM — M25.571 ACUTE RIGHT ANKLE PAIN: Status: RESOLVED | Noted: 2024-03-08 | Resolved: 2025-05-15

## 2025-05-15 PROBLEM — R26.9 ABNORMAL GAIT: Status: RESOLVED | Noted: 2023-10-16 | Resolved: 2025-05-15

## 2025-05-15 PROBLEM — S82.401A TIBIA/FIBULA FRACTURE, RIGHT, CLOSED, INITIAL ENCOUNTER: Status: RESOLVED | Noted: 2024-03-08 | Resolved: 2025-05-15

## 2025-06-12 ENCOUNTER — PATIENT OUTREACH (OUTPATIENT)
Dept: CARE COORDINATION | Facility: CLINIC | Age: 70
End: 2025-06-12
Payer: COMMERCIAL

## 2025-06-23 ENCOUNTER — HOSPITAL ENCOUNTER (OUTPATIENT)
Dept: MAMMOGRAPHY | Facility: CLINIC | Age: 70
Discharge: HOME | End: 2025-06-23
Payer: COMMERCIAL

## 2025-06-23 DIAGNOSIS — Z12.31 VISIT FOR SCREENING MAMMOGRAM: ICD-10-CM

## 2025-06-23 PROCEDURE — 77063 BREAST TOMOSYNTHESIS BI: CPT

## (undated) DEVICE — GLOVE BIOGEL PI SZ 7.0 40870

## (undated) DEVICE — PACK TOTAL KNEE SOP15TKFSD

## (undated) DEVICE — ESU GROUND PAD UNIVERSAL W/O CORD

## (undated) DEVICE — SOL NACL 0.9% INJ 1000ML BAG 2B1324X

## (undated) DEVICE — BAG CLEAR TRASH 1.3M 39X33" P4040C

## (undated) DEVICE — SOL NACL 0.9% IRRIG 1000ML BOTTLE 2F7124

## (undated) DEVICE — GLOVE BIOGEL PI MICRO INDICATOR UNDERGLOVE SZ 6.5 48965

## (undated) DEVICE — SURGICEL FIBRILLAR HEMOSTAT 1"X2" 1961

## (undated) DEVICE — NDL SPINAL 18GA 3.5" 405184

## (undated) DEVICE — HOOD SURG T7PLUS PEEL AWAY FACE SHIELD STRL LF 0416-801-100

## (undated) DEVICE — DRSG TELFA 2X3"

## (undated) DEVICE — SUCTION MANIFOLD NEPTUNE 2 SYS 4 PORT 0702-020-000

## (undated) DEVICE — BONE CEMENT MIXEVAC III HI VAC KIT  0206-015-000

## (undated) DEVICE — LINEN TOWEL PACK X5 5464

## (undated) DEVICE — BONE CLEANING TIP INTERPULSE  0210-010-000

## (undated) DEVICE — ESU LIGASURE DISSECTOR EXACT LF2019

## (undated) DEVICE — SOL NACL 0.9% IRRIG 3000ML BAG 2B7477

## (undated) DEVICE — BLADE SAW SAGITTAL STRK 18X90X1.27MM HD SYS 6 6118-127-090

## (undated) DEVICE — SYR 50ML LL W/O NDL 309653

## (undated) DEVICE — STPL SKIN 35W 6.9MM  PXW35

## (undated) DEVICE — SU VICRYL 2-0 CP-1 27" UND J266H

## (undated) DEVICE — SU ETHIBOND 0 CTX CR  8X18" CX31D

## (undated) DEVICE — SOL WATER IRRIG 1000ML BOTTLE 2F7114

## (undated) DEVICE — BLADE SAW SAGITTAL THK1.13 MM L90 MM X W18 MM 4118-127-090

## (undated) DEVICE — PREP SKIN SCRUB TRAY 4461A

## (undated) DEVICE — DRAPE STERI U 1015

## (undated) DEVICE — ESU CORD BIPOLAR GREEN 10-4000

## (undated) DEVICE — SU PROLENE 4-0 RB-1DA 36" 8557H

## (undated) DEVICE — DRAPE SHEET REV FOLD 3/4 9349

## (undated) DEVICE — LINEN HALF SHEET 5512

## (undated) DEVICE — SPONGE KITTNER 30-101

## (undated) DEVICE — SU VICRYL 4-0 TIE 12X18" DYED J103T

## (undated) DEVICE — NDL 22GA 1.5"

## (undated) DEVICE — MANIFOLD NEPTUNE 4 PORT 700-20

## (undated) DEVICE — PACK MAJOR HEAD AND NECK RIDGES

## (undated) DEVICE — GLOVE BIOGEL PI MICRO INDICATOR UNDERGLOVE SZ 8.0 48980

## (undated) DEVICE — GLOVE BIOGEL PI MICRO SZ 6.5 48565

## (undated) DEVICE — SU VICRYL 0 CP-1 27" J467H

## (undated) DEVICE — SOLUTION WOUND CLEANSING 3/4OZ 10% PVP EA-L3011FB-50

## (undated) DEVICE — GLOVE BIOGEL PI SZ 7.5 40875

## (undated) DEVICE — PREP CHLORAPREP 26ML TINTED HI-LITE ORANGE 930815

## (undated) DEVICE — NIM PROBE PRASS STIMULATOR PROTECTED TIP 8225101

## (undated) DEVICE — SUCTION IRR SYSTEM W/O TIP INTERPULSE HANDPIECE 0210-100-000

## (undated) DEVICE — SYR 03ML LL W/O NDL 309657

## (undated) DEVICE — SU VICRYL 3-0 SH 27" J316H

## (undated) DEVICE — BLADE SAW SAGITTAL STRK 25X90X1.37MM 4H SYS 6 6125-137-090

## (undated) DEVICE — ESU GROUND PAD ADULT W/CORD E7507

## (undated) DEVICE — SU MONOCRYL 4-0 PS-2 27" UND Y426H

## (undated) DEVICE — LINEN TOWEL PACK X10 5473

## (undated) DEVICE — SU DERMABOND ADVANCED .7ML DNX6

## (undated) DEVICE — LINEN FULL SHEET 5511

## (undated) DEVICE — DECANTER BAG 2002S

## (undated) DEVICE — ESU ELEC BLADE 2.75" COATED/INSULATED E1455

## (undated) DEVICE — SU ETHIBOND 0 CTX 30" X864H

## (undated) DEVICE — DRSG XEROFORM 5X9" 8884431605

## (undated) DEVICE — DRSG STERI STRIP 1/2X4" R1547

## (undated) DEVICE — WRAP EZY KNEE 1213PP

## (undated) DEVICE — SU VICRYL 3-0 TIE 12X18" J904T

## (undated) DEVICE — SUCTION TIP YANKAUER W/O VENT K86

## (undated) RX ORDER — DEXAMETHASONE SODIUM PHOSPHATE 4 MG/ML
INJECTION, SOLUTION INTRA-ARTICULAR; INTRALESIONAL; INTRAMUSCULAR; INTRAVENOUS; SOFT TISSUE
Status: DISPENSED
Start: 2025-01-28

## (undated) RX ORDER — ONDANSETRON 2 MG/ML
INJECTION INTRAMUSCULAR; INTRAVENOUS
Status: DISPENSED
Start: 2024-02-07

## (undated) RX ORDER — FENTANYL CITRATE 0.05 MG/ML
INJECTION, SOLUTION INTRAMUSCULAR; INTRAVENOUS
Status: DISPENSED
Start: 2025-01-28

## (undated) RX ORDER — FENTANYL CITRATE 0.05 MG/ML
INJECTION, SOLUTION INTRAMUSCULAR; INTRAVENOUS
Status: DISPENSED
Start: 2023-10-03

## (undated) RX ORDER — ACETAMINOPHEN 325 MG/1
TABLET ORAL
Status: DISPENSED
Start: 2025-01-28

## (undated) RX ORDER — TRANEXAMIC ACID 650 MG/1
TABLET ORAL
Status: DISPENSED
Start: 2023-10-03

## (undated) RX ORDER — HYDROMORPHONE HYDROCHLORIDE 2 MG/1
TABLET ORAL
Status: DISPENSED
Start: 2025-01-28

## (undated) RX ORDER — PROPOFOL 10 MG/ML
INJECTION, EMULSION INTRAVENOUS
Status: DISPENSED
Start: 2024-02-07

## (undated) RX ORDER — ONDANSETRON 2 MG/ML
INJECTION INTRAMUSCULAR; INTRAVENOUS
Status: DISPENSED
Start: 2025-01-28

## (undated) RX ORDER — BUPIVACAINE HYDROCHLORIDE 5 MG/ML
INJECTION, SOLUTION EPIDURAL; INTRACAUDAL
Status: DISPENSED
Start: 2024-02-07

## (undated) RX ORDER — VANCOMYCIN HYDROCHLORIDE 1 G/20ML
INJECTION, POWDER, LYOPHILIZED, FOR SOLUTION INTRAVENOUS
Status: DISPENSED
Start: 2023-10-03

## (undated) RX ORDER — OXYCODONE HYDROCHLORIDE 5 MG/1
TABLET ORAL
Status: DISPENSED
Start: 2024-02-07

## (undated) RX ORDER — HYDROMORPHONE HYDROCHLORIDE 1 MG/ML
INJECTION, SOLUTION INTRAMUSCULAR; INTRAVENOUS; SUBCUTANEOUS
Status: DISPENSED
Start: 2025-01-28

## (undated) RX ORDER — SCOLOPAMINE TRANSDERMAL SYSTEM 1 MG/1
PATCH, EXTENDED RELEASE TRANSDERMAL
Status: DISPENSED
Start: 2024-02-07

## (undated) RX ORDER — FENTANYL CITRATE 50 UG/ML
INJECTION, SOLUTION INTRAMUSCULAR; INTRAVENOUS
Status: DISPENSED
Start: 2025-01-28

## (undated) RX ORDER — TRANEXAMIC ACID 650 MG/1
TABLET ORAL
Status: DISPENSED
Start: 2025-01-28

## (undated) RX ORDER — FENTANYL CITRATE 50 UG/ML
INJECTION, SOLUTION INTRAMUSCULAR; INTRAVENOUS
Status: DISPENSED
Start: 2024-02-07

## (undated) RX ORDER — ACETAMINOPHEN 325 MG/1
TABLET ORAL
Status: DISPENSED
Start: 2024-02-07

## (undated) RX ORDER — KETOROLAC TROMETHAMINE 30 MG/ML
INJECTION, SOLUTION INTRAMUSCULAR; INTRAVENOUS
Status: DISPENSED
Start: 2024-02-07

## (undated) RX ORDER — PROPOFOL 10 MG/ML
INJECTION, EMULSION INTRAVENOUS
Status: DISPENSED
Start: 2025-01-28

## (undated) RX ORDER — CEFAZOLIN SODIUM/WATER 2 G/20 ML
SYRINGE (ML) INTRAVENOUS
Status: DISPENSED
Start: 2024-02-07

## (undated) RX ORDER — FENTANYL CITRATE 50 UG/ML
INJECTION, SOLUTION INTRAMUSCULAR; INTRAVENOUS
Status: DISPENSED
Start: 2023-10-03

## (undated) RX ORDER — VANCOMYCIN HYDROCHLORIDE 1 G/20ML
INJECTION, POWDER, LYOPHILIZED, FOR SOLUTION INTRAVENOUS
Status: DISPENSED
Start: 2025-01-28